# Patient Record
Sex: FEMALE | Race: WHITE | Employment: OTHER | URBAN - METROPOLITAN AREA
[De-identification: names, ages, dates, MRNs, and addresses within clinical notes are randomized per-mention and may not be internally consistent; named-entity substitution may affect disease eponyms.]

---

## 2017-01-19 ENCOUNTER — NURSE ONLY (OUTPATIENT)
Dept: FAMILY MEDICINE CLINIC | Age: 82
End: 2017-01-19

## 2017-01-19 DIAGNOSIS — Z79.01 LONG TERM CURRENT USE OF ANTICOAGULANT THERAPY: Primary | ICD-10-CM

## 2017-01-19 DIAGNOSIS — I48.91 ATRIAL FIBRILLATION, UNSPECIFIED TYPE (HCC): ICD-10-CM

## 2017-01-19 LAB
INTERNATIONAL NORMALIZATION RATIO, POC: 2.5
PROTHROMBIN TIME, POC: 29.9

## 2017-01-19 PROCEDURE — 85610 PROTHROMBIN TIME: CPT | Performed by: NURSE PRACTITIONER

## 2017-02-08 ENCOUNTER — TELEPHONE (OUTPATIENT)
Dept: FAMILY MEDICINE CLINIC | Age: 82
End: 2017-02-08

## 2017-02-08 RX ORDER — CELECOXIB 200 MG/1
CAPSULE ORAL
Qty: 30 CAPSULE | Refills: 0 | Status: SHIPPED | OUTPATIENT
Start: 2017-02-08 | End: 2017-02-14 | Stop reason: SDUPTHER

## 2017-02-08 RX ORDER — AMLODIPINE BESYLATE 5 MG/1
5 TABLET ORAL DAILY
Qty: 30 TABLET | Refills: 0 | Status: SHIPPED | OUTPATIENT
Start: 2017-02-08 | End: 2017-02-14 | Stop reason: SDUPTHER

## 2017-02-08 RX ORDER — LEVOTHYROXINE SODIUM 0.07 MG/1
TABLET ORAL
Qty: 30 TABLET | Refills: 0 | Status: SHIPPED | OUTPATIENT
Start: 2017-02-08 | End: 2017-02-15 | Stop reason: SDUPTHER

## 2017-02-14 ENCOUNTER — OFFICE VISIT (OUTPATIENT)
Dept: FAMILY MEDICINE CLINIC | Age: 82
End: 2017-02-14

## 2017-02-14 VITALS
TEMPERATURE: 97.6 F | WEIGHT: 135 LBS | OXYGEN SATURATION: 99 % | SYSTOLIC BLOOD PRESSURE: 136 MMHG | BODY MASS INDEX: 25.49 KG/M2 | HEIGHT: 61 IN | DIASTOLIC BLOOD PRESSURE: 80 MMHG | HEART RATE: 74 BPM | RESPIRATION RATE: 14 BRPM

## 2017-02-14 DIAGNOSIS — E03.9 ACQUIRED HYPOTHYROIDISM: Primary | ICD-10-CM

## 2017-02-14 DIAGNOSIS — M47.819 ARTHRITIS OF SPINE: ICD-10-CM

## 2017-02-14 DIAGNOSIS — I10 ESSENTIAL HYPERTENSION: ICD-10-CM

## 2017-02-14 DIAGNOSIS — I48.20 CHRONIC ATRIAL FIBRILLATION (HCC): ICD-10-CM

## 2017-02-14 LAB
INTERNATIONAL NORMALIZATION RATIO, POC: 3
PROTHROMBIN TIME, POC: 36.2
TSH REFLEX: 0.59 UIU/ML (ref 0.27–4.2)

## 2017-02-14 PROCEDURE — 1123F ACP DISCUSS/DSCN MKR DOCD: CPT | Performed by: NURSE PRACTITIONER

## 2017-02-14 PROCEDURE — 36415 COLL VENOUS BLD VENIPUNCTURE: CPT | Performed by: NURSE PRACTITIONER

## 2017-02-14 PROCEDURE — 99213 OFFICE O/P EST LOW 20 MIN: CPT | Performed by: NURSE PRACTITIONER

## 2017-02-14 PROCEDURE — G8420 CALC BMI NORM PARAMETERS: HCPCS | Performed by: NURSE PRACTITIONER

## 2017-02-14 PROCEDURE — 4040F PNEUMOC VAC/ADMIN/RCVD: CPT | Performed by: NURSE PRACTITIONER

## 2017-02-14 PROCEDURE — G8427 DOCREV CUR MEDS BY ELIG CLIN: HCPCS | Performed by: NURSE PRACTITIONER

## 2017-02-14 PROCEDURE — G8484 FLU IMMUNIZE NO ADMIN: HCPCS | Performed by: NURSE PRACTITIONER

## 2017-02-14 PROCEDURE — 85610 PROTHROMBIN TIME: CPT | Performed by: NURSE PRACTITIONER

## 2017-02-14 PROCEDURE — 1090F PRES/ABSN URINE INCON ASSESS: CPT | Performed by: NURSE PRACTITIONER

## 2017-02-14 PROCEDURE — 1036F TOBACCO NON-USER: CPT | Performed by: NURSE PRACTITIONER

## 2017-02-14 RX ORDER — AMLODIPINE BESYLATE 5 MG/1
5 TABLET ORAL DAILY
Qty: 90 TABLET | Refills: 3 | Status: ON HOLD | OUTPATIENT
Start: 2017-02-14 | End: 2017-09-14 | Stop reason: HOSPADM

## 2017-02-14 RX ORDER — NICOTINE POLACRILEX 4 MG/1
20 GUM, CHEWING ORAL DAILY
COMMUNITY

## 2017-02-14 RX ORDER — CELECOXIB 200 MG/1
CAPSULE ORAL
Qty: 90 CAPSULE | Refills: 3 | Status: SHIPPED | OUTPATIENT
Start: 2017-02-14 | End: 2017-12-23 | Stop reason: SDUPTHER

## 2017-02-14 RX ORDER — LEVOTHYROXINE SODIUM 0.07 MG/1
TABLET ORAL
Qty: 90 TABLET | Refills: 3 | Status: CANCELLED | OUTPATIENT
Start: 2017-02-14

## 2017-02-15 RX ORDER — LEVOTHYROXINE SODIUM 0.07 MG/1
TABLET ORAL
Qty: 90 TABLET | Refills: 2 | Status: SHIPPED | OUTPATIENT
Start: 2017-02-15 | End: 2017-09-30 | Stop reason: SDUPTHER

## 2017-02-27 ENCOUNTER — TELEPHONE (OUTPATIENT)
Dept: FAMILY MEDICINE CLINIC | Age: 82
End: 2017-02-27

## 2017-03-07 RX ORDER — GUANFACINE 1 MG/1
TABLET ORAL
Qty: 60 TABLET | Refills: 4 | Status: SHIPPED | OUTPATIENT
Start: 2017-03-07 | End: 2018-03-06 | Stop reason: SDUPTHER

## 2017-03-08 RX ORDER — WARFARIN SODIUM 2 MG/1
TABLET ORAL
Qty: 135 TABLET | Refills: 0 | Status: SHIPPED | OUTPATIENT
Start: 2017-03-08 | End: 2017-11-08 | Stop reason: SDUPTHER

## 2017-03-16 ENCOUNTER — ANTI-COAG VISIT (OUTPATIENT)
Dept: FAMILY MEDICINE CLINIC | Age: 82
End: 2017-03-16

## 2017-03-16 DIAGNOSIS — I48.91 ATRIAL FIBRILLATION, UNSPECIFIED TYPE (HCC): ICD-10-CM

## 2017-03-16 DIAGNOSIS — Z79.01 LONG TERM CURRENT USE OF ANTICOAGULANT THERAPY: Primary | ICD-10-CM

## 2017-03-16 LAB
INTERNATIONAL NORMALIZATION RATIO, POC: 2.1
PROTHROMBIN TIME, POC: 25.8

## 2017-03-16 PROCEDURE — 85610 PROTHROMBIN TIME: CPT | Performed by: FAMILY MEDICINE

## 2017-04-07 RX ORDER — TRAMADOL HYDROCHLORIDE 50 MG/1
TABLET ORAL
Qty: 120 TABLET | Refills: 0 | OUTPATIENT
Start: 2017-04-07

## 2017-04-11 RX ORDER — TRAMADOL HYDROCHLORIDE 50 MG/1
TABLET ORAL
Qty: 120 TABLET | Refills: 0 | Status: SHIPPED | OUTPATIENT
Start: 2017-04-11 | End: 2017-04-12 | Stop reason: SDUPTHER

## 2017-04-12 ENCOUNTER — OFFICE VISIT (OUTPATIENT)
Dept: FAMILY MEDICINE CLINIC | Age: 82
End: 2017-04-12

## 2017-04-12 VITALS
WEIGHT: 132.6 LBS | BODY MASS INDEX: 25.04 KG/M2 | RESPIRATION RATE: 16 BRPM | DIASTOLIC BLOOD PRESSURE: 86 MMHG | OXYGEN SATURATION: 98 % | HEIGHT: 61 IN | HEART RATE: 75 BPM | SYSTOLIC BLOOD PRESSURE: 160 MMHG

## 2017-04-12 DIAGNOSIS — I10 ESSENTIAL HYPERTENSION: Primary | ICD-10-CM

## 2017-04-12 DIAGNOSIS — E03.9 ACQUIRED HYPOTHYROIDISM: ICD-10-CM

## 2017-04-12 DIAGNOSIS — I48.91 ATRIAL FIBRILLATION, UNSPECIFIED TYPE (HCC): ICD-10-CM

## 2017-04-12 DIAGNOSIS — Z79.01 LONG TERM CURRENT USE OF ANTICOAGULANT THERAPY: ICD-10-CM

## 2017-04-12 DIAGNOSIS — E78.00 HYPERCHOLESTEREMIA: ICD-10-CM

## 2017-04-12 DIAGNOSIS — M15.9 PRIMARY OSTEOARTHRITIS INVOLVING MULTIPLE JOINTS: ICD-10-CM

## 2017-04-12 DIAGNOSIS — K21.9 GASTROESOPHAGEAL REFLUX DISEASE, ESOPHAGITIS PRESENCE NOT SPECIFIED: ICD-10-CM

## 2017-04-12 DIAGNOSIS — K59.04 CHRONIC IDIOPATHIC CONSTIPATION: ICD-10-CM

## 2017-04-12 LAB
INTERNATIONAL NORMALIZATION RATIO, POC: 2.6
PROTHROMBIN TIME, POC: 30.8

## 2017-04-12 PROCEDURE — G8420 CALC BMI NORM PARAMETERS: HCPCS | Performed by: FAMILY MEDICINE

## 2017-04-12 PROCEDURE — G8427 DOCREV CUR MEDS BY ELIG CLIN: HCPCS | Performed by: FAMILY MEDICINE

## 2017-04-12 PROCEDURE — 4040F PNEUMOC VAC/ADMIN/RCVD: CPT | Performed by: FAMILY MEDICINE

## 2017-04-12 PROCEDURE — 1123F ACP DISCUSS/DSCN MKR DOCD: CPT | Performed by: FAMILY MEDICINE

## 2017-04-12 PROCEDURE — 1036F TOBACCO NON-USER: CPT | Performed by: FAMILY MEDICINE

## 2017-04-12 PROCEDURE — 85610 PROTHROMBIN TIME: CPT | Performed by: FAMILY MEDICINE

## 2017-04-12 PROCEDURE — 1090F PRES/ABSN URINE INCON ASSESS: CPT | Performed by: FAMILY MEDICINE

## 2017-04-12 PROCEDURE — 99214 OFFICE O/P EST MOD 30 MIN: CPT | Performed by: FAMILY MEDICINE

## 2017-04-12 RX ORDER — TRAMADOL HYDROCHLORIDE 50 MG/1
TABLET ORAL
Qty: 120 TABLET | Refills: 0 | Status: ON HOLD | OUTPATIENT
Start: 2017-04-12 | End: 2017-09-14 | Stop reason: HOSPADM

## 2017-04-12 RX ORDER — LOSARTAN POTASSIUM 50 MG/1
TABLET ORAL
Qty: 60 TABLET | Refills: 0 | Status: SHIPPED | OUTPATIENT
Start: 2017-04-12 | End: 2017-05-11 | Stop reason: SDUPTHER

## 2017-04-28 ENCOUNTER — PATIENT MESSAGE (OUTPATIENT)
Dept: FAMILY MEDICINE CLINIC | Age: 82
End: 2017-04-28

## 2017-04-28 RX ORDER — CLONIDINE HYDROCHLORIDE 0.1 MG/1
TABLET ORAL
Qty: 60 TABLET | Refills: 2 | Status: SHIPPED | OUTPATIENT
Start: 2017-04-28 | End: 2017-05-01 | Stop reason: SDUPTHER

## 2017-04-29 ENCOUNTER — PATIENT MESSAGE (OUTPATIENT)
Dept: FAMILY MEDICINE CLINIC | Age: 82
End: 2017-04-29

## 2017-05-01 RX ORDER — CLONIDINE HYDROCHLORIDE 0.1 MG/1
TABLET ORAL
Qty: 60 TABLET | Refills: 0 | Status: SHIPPED | OUTPATIENT
Start: 2017-05-01 | End: 2017-05-11 | Stop reason: SDUPTHER

## 2017-05-02 ENCOUNTER — TELEPHONE (OUTPATIENT)
Dept: FAMILY MEDICINE CLINIC | Age: 82
End: 2017-05-02

## 2017-05-10 RX ORDER — CLONIDINE HYDROCHLORIDE 0.1 MG/1
TABLET ORAL
Qty: 180 TABLET | Refills: 0 | Status: SHIPPED | OUTPATIENT
Start: 2017-05-10 | End: 2017-06-19 | Stop reason: ALTCHOICE

## 2017-05-11 ENCOUNTER — PATIENT MESSAGE (OUTPATIENT)
Dept: FAMILY MEDICINE CLINIC | Age: 82
End: 2017-05-11

## 2017-05-11 ENCOUNTER — OFFICE VISIT (OUTPATIENT)
Dept: FAMILY MEDICINE CLINIC | Age: 82
End: 2017-05-11

## 2017-05-11 VITALS
DIASTOLIC BLOOD PRESSURE: 74 MMHG | SYSTOLIC BLOOD PRESSURE: 120 MMHG | HEART RATE: 54 BPM | BODY MASS INDEX: 24.66 KG/M2 | OXYGEN SATURATION: 97 % | WEIGHT: 134 LBS | HEIGHT: 62 IN | RESPIRATION RATE: 12 BRPM

## 2017-05-11 DIAGNOSIS — Z79.01 LONG TERM CURRENT USE OF ANTICOAGULANT THERAPY: ICD-10-CM

## 2017-05-11 DIAGNOSIS — I48.91 ATRIAL FIBRILLATION, UNSPECIFIED TYPE (HCC): ICD-10-CM

## 2017-05-11 DIAGNOSIS — R39.9 UTI SYMPTOMS: ICD-10-CM

## 2017-05-11 DIAGNOSIS — I10 ESSENTIAL HYPERTENSION: Primary | ICD-10-CM

## 2017-05-11 DIAGNOSIS — R44.3 HALLUCINATIONS: ICD-10-CM

## 2017-05-11 LAB
BILIRUBIN, POC: 1.02
BLOOD URINE, POC: NORMAL
CLARITY, POC: NORMAL
COLOR, POC: NORMAL
GLUCOSE URINE, POC: NORMAL
INTERNATIONAL NORMALIZATION RATIO, POC: 2.9
KETONES, POC: NORMAL
LEUKOCYTE EST, POC: NORMAL
NITRITE, POC: NORMAL
PH, POC: 7
PROTEIN, POC: NORMAL
PROTHROMBIN TIME, POC: 35.1
SPECIFIC GRAVITY, POC: 7
UROBILINOGEN, POC: 0.2

## 2017-05-11 PROCEDURE — 85610 PROTHROMBIN TIME: CPT | Performed by: FAMILY MEDICINE

## 2017-05-11 PROCEDURE — 99214 OFFICE O/P EST MOD 30 MIN: CPT | Performed by: FAMILY MEDICINE

## 2017-05-11 PROCEDURE — 1036F TOBACCO NON-USER: CPT | Performed by: FAMILY MEDICINE

## 2017-05-11 PROCEDURE — 1090F PRES/ABSN URINE INCON ASSESS: CPT | Performed by: FAMILY MEDICINE

## 2017-05-11 PROCEDURE — 1123F ACP DISCUSS/DSCN MKR DOCD: CPT | Performed by: FAMILY MEDICINE

## 2017-05-11 PROCEDURE — 81002 URINALYSIS NONAUTO W/O SCOPE: CPT | Performed by: FAMILY MEDICINE

## 2017-05-11 PROCEDURE — G8420 CALC BMI NORM PARAMETERS: HCPCS | Performed by: FAMILY MEDICINE

## 2017-05-11 PROCEDURE — 4040F PNEUMOC VAC/ADMIN/RCVD: CPT | Performed by: FAMILY MEDICINE

## 2017-05-11 PROCEDURE — G8427 DOCREV CUR MEDS BY ELIG CLIN: HCPCS | Performed by: FAMILY MEDICINE

## 2017-05-11 RX ORDER — SPIRONOLACTONE 25 MG/1
25 TABLET ORAL DAILY
Qty: 30 TABLET | Refills: 3 | Status: SHIPPED | OUTPATIENT
Start: 2017-05-11 | End: 2017-05-12 | Stop reason: SDUPTHER

## 2017-05-11 RX ORDER — LOSARTAN POTASSIUM 100 MG/1
TABLET ORAL
Qty: 30 TABLET | Refills: 5 | Status: SHIPPED | OUTPATIENT
Start: 2017-05-11 | End: 2017-05-12 | Stop reason: SDUPTHER

## 2017-05-12 ENCOUNTER — PATIENT MESSAGE (OUTPATIENT)
Dept: FAMILY MEDICINE CLINIC | Age: 82
End: 2017-05-12

## 2017-05-12 RX ORDER — SPIRONOLACTONE 25 MG/1
25 TABLET ORAL DAILY
Qty: 30 TABLET | Refills: 3 | Status: SHIPPED | OUTPATIENT
Start: 2017-05-12 | End: 2017-09-05 | Stop reason: SDUPTHER

## 2017-05-12 RX ORDER — LOSARTAN POTASSIUM 100 MG/1
TABLET ORAL
Qty: 30 TABLET | Refills: 5 | Status: SHIPPED | OUTPATIENT
Start: 2017-05-12 | End: 2017-11-08 | Stop reason: SDUPTHER

## 2017-05-13 LAB — URINE CULTURE, ROUTINE: NORMAL

## 2017-05-23 ENCOUNTER — PATIENT MESSAGE (OUTPATIENT)
Dept: FAMILY MEDICINE CLINIC | Age: 82
End: 2017-05-23

## 2017-06-07 ENCOUNTER — NURSE ONLY (OUTPATIENT)
Dept: FAMILY MEDICINE CLINIC | Age: 82
End: 2017-06-07

## 2017-06-07 DIAGNOSIS — Z79.01 LONG TERM CURRENT USE OF ANTICOAGULANT THERAPY: ICD-10-CM

## 2017-06-07 LAB
INTERNATIONAL NORMALIZATION RATIO, POC: 2
PROTHROMBIN TIME, POC: 23.7

## 2017-06-07 PROCEDURE — 85610 PROTHROMBIN TIME: CPT | Performed by: FAMILY MEDICINE

## 2017-06-19 ENCOUNTER — OFFICE VISIT (OUTPATIENT)
Dept: FAMILY MEDICINE CLINIC | Age: 82
End: 2017-06-19

## 2017-06-19 VITALS
BODY MASS INDEX: 24.55 KG/M2 | HEIGHT: 61 IN | DIASTOLIC BLOOD PRESSURE: 84 MMHG | OXYGEN SATURATION: 98 % | SYSTOLIC BLOOD PRESSURE: 138 MMHG | RESPIRATION RATE: 12 BRPM | WEIGHT: 130 LBS | HEART RATE: 81 BPM

## 2017-06-19 DIAGNOSIS — I10 ESSENTIAL HYPERTENSION: Primary | ICD-10-CM

## 2017-06-19 PROCEDURE — G8427 DOCREV CUR MEDS BY ELIG CLIN: HCPCS | Performed by: FAMILY MEDICINE

## 2017-06-19 PROCEDURE — 99213 OFFICE O/P EST LOW 20 MIN: CPT | Performed by: FAMILY MEDICINE

## 2017-06-19 PROCEDURE — 1090F PRES/ABSN URINE INCON ASSESS: CPT | Performed by: FAMILY MEDICINE

## 2017-06-19 PROCEDURE — 4040F PNEUMOC VAC/ADMIN/RCVD: CPT | Performed by: FAMILY MEDICINE

## 2017-06-19 PROCEDURE — G8420 CALC BMI NORM PARAMETERS: HCPCS | Performed by: FAMILY MEDICINE

## 2017-06-19 PROCEDURE — 1036F TOBACCO NON-USER: CPT | Performed by: FAMILY MEDICINE

## 2017-06-19 PROCEDURE — 1123F ACP DISCUSS/DSCN MKR DOCD: CPT | Performed by: FAMILY MEDICINE

## 2017-07-05 ENCOUNTER — ANTI-COAG VISIT (OUTPATIENT)
Dept: FAMILY MEDICINE CLINIC | Age: 82
End: 2017-07-05

## 2017-07-05 DIAGNOSIS — Z79.01 LONG TERM CURRENT USE OF ANTICOAGULANT THERAPY: ICD-10-CM

## 2017-07-05 LAB
INTERNATIONAL NORMALIZATION RATIO, POC: 3
PROTHROMBIN TIME, POC: 36.1

## 2017-07-05 PROCEDURE — 85610 PROTHROMBIN TIME: CPT | Performed by: FAMILY MEDICINE

## 2017-08-03 ENCOUNTER — ANTI-COAG VISIT (OUTPATIENT)
Dept: FAMILY MEDICINE CLINIC | Age: 82
End: 2017-08-03

## 2017-08-03 DIAGNOSIS — Z79.01 LONG TERM CURRENT USE OF ANTICOAGULANT THERAPY: ICD-10-CM

## 2017-08-03 LAB
INTERNATIONAL NORMALIZATION RATIO, POC: 2.4
PROTHROMBIN TIME, POC: 28.2

## 2017-08-03 PROCEDURE — 85610 PROTHROMBIN TIME: CPT | Performed by: FAMILY MEDICINE

## 2017-08-31 ENCOUNTER — ANTI-COAG VISIT (OUTPATIENT)
Dept: FAMILY MEDICINE CLINIC | Age: 82
End: 2017-08-31

## 2017-08-31 DIAGNOSIS — Z79.01 LONG TERM CURRENT USE OF ANTICOAGULANT THERAPY: ICD-10-CM

## 2017-08-31 LAB
INTERNATIONAL NORMALIZATION RATIO, POC: 3.3
PROTHROMBIN TIME, POC: 39.4

## 2017-08-31 PROCEDURE — 85610 PROTHROMBIN TIME: CPT | Performed by: FAMILY MEDICINE

## 2017-09-05 RX ORDER — SPIRONOLACTONE 25 MG/1
25 TABLET ORAL DAILY
Qty: 90 TABLET | Refills: 3 | Status: SHIPPED | OUTPATIENT
Start: 2017-09-05 | End: 2018-08-30 | Stop reason: SDUPTHER

## 2017-09-07 ENCOUNTER — ANTI-COAG VISIT (OUTPATIENT)
Dept: FAMILY MEDICINE CLINIC | Age: 82
End: 2017-09-07

## 2017-09-07 DIAGNOSIS — Z79.01 LONG TERM CURRENT USE OF ANTICOAGULANT THERAPY: ICD-10-CM

## 2017-09-07 DIAGNOSIS — I48.91 ATRIAL FIBRILLATION, UNSPECIFIED TYPE (HCC): Primary | ICD-10-CM

## 2017-09-07 LAB
INTERNATIONAL NORMALIZATION RATIO, POC: 2.1
PROTHROMBIN TIME, POC: 25.6

## 2017-09-07 PROCEDURE — 85610 PROTHROMBIN TIME: CPT | Performed by: FAMILY MEDICINE

## 2017-09-15 ENCOUNTER — CARE COORDINATION (OUTPATIENT)
Dept: CASE MANAGEMENT | Age: 82
End: 2017-09-15

## 2017-09-20 ENCOUNTER — OFFICE VISIT (OUTPATIENT)
Dept: FAMILY MEDICINE CLINIC | Age: 82
End: 2017-09-20

## 2017-09-20 VITALS
OXYGEN SATURATION: 98 % | DIASTOLIC BLOOD PRESSURE: 76 MMHG | SYSTOLIC BLOOD PRESSURE: 128 MMHG | RESPIRATION RATE: 14 BRPM | HEIGHT: 60 IN | BODY MASS INDEX: 25.72 KG/M2 | HEART RATE: 76 BPM | WEIGHT: 131 LBS

## 2017-09-20 DIAGNOSIS — E87.1 HYPONATREMIA: Primary | ICD-10-CM

## 2017-09-20 DIAGNOSIS — M19.90 ARTHRITIS: ICD-10-CM

## 2017-09-20 DIAGNOSIS — I48.91 ATRIAL FIBRILLATION, UNSPECIFIED TYPE (HCC): ICD-10-CM

## 2017-09-20 DIAGNOSIS — I10 ESSENTIAL HYPERTENSION: ICD-10-CM

## 2017-09-20 DIAGNOSIS — Z79.01 LONG TERM CURRENT USE OF ANTICOAGULANT THERAPY: ICD-10-CM

## 2017-09-20 DIAGNOSIS — C23 ADENOCARCINOMA OF GALLBLADDER (HCC): ICD-10-CM

## 2017-09-20 DIAGNOSIS — E03.9 ACQUIRED HYPOTHYROIDISM: ICD-10-CM

## 2017-09-20 DIAGNOSIS — R53.1 GENERAL WEAKNESS: ICD-10-CM

## 2017-09-20 LAB
INTERNATIONAL NORMALIZATION RATIO, POC: 1.9
PROTHROMBIN TIME, POC: 22.3

## 2017-09-20 PROCEDURE — 85610 PROTHROMBIN TIME: CPT | Performed by: FAMILY MEDICINE

## 2017-09-20 PROCEDURE — G8427 DOCREV CUR MEDS BY ELIG CLIN: HCPCS | Performed by: FAMILY MEDICINE

## 2017-09-20 PROCEDURE — 99214 OFFICE O/P EST MOD 30 MIN: CPT | Performed by: FAMILY MEDICINE

## 2017-09-20 PROCEDURE — 4040F PNEUMOC VAC/ADMIN/RCVD: CPT | Performed by: FAMILY MEDICINE

## 2017-09-20 PROCEDURE — G8417 CALC BMI ABV UP PARAM F/U: HCPCS | Performed by: FAMILY MEDICINE

## 2017-09-20 PROCEDURE — 1036F TOBACCO NON-USER: CPT | Performed by: FAMILY MEDICINE

## 2017-09-20 PROCEDURE — 1111F DSCHRG MED/CURRENT MED MERGE: CPT | Performed by: FAMILY MEDICINE

## 2017-09-20 PROCEDURE — 1123F ACP DISCUSS/DSCN MKR DOCD: CPT | Performed by: FAMILY MEDICINE

## 2017-09-20 PROCEDURE — 1090F PRES/ABSN URINE INCON ASSESS: CPT | Performed by: FAMILY MEDICINE

## 2017-09-20 RX ORDER — TRAMADOL HYDROCHLORIDE 50 MG/1
50 TABLET ORAL EVERY 6 HOURS PRN
Qty: 120 TABLET | Refills: 0 | Status: SHIPPED | OUTPATIENT
Start: 2017-09-20 | End: 2018-03-08 | Stop reason: SDUPTHER

## 2017-09-21 ENCOUNTER — CARE COORDINATION (OUTPATIENT)
Dept: CASE MANAGEMENT | Age: 82
End: 2017-09-21

## 2017-10-02 RX ORDER — LEVOTHYROXINE SODIUM 0.07 MG/1
TABLET ORAL
Qty: 90 TABLET | Refills: 5 | Status: SHIPPED | OUTPATIENT
Start: 2017-10-02 | End: 2019-01-14 | Stop reason: SDUPTHER

## 2017-10-12 ENCOUNTER — OFFICE VISIT (OUTPATIENT)
Dept: FAMILY MEDICINE CLINIC | Age: 82
End: 2017-10-12

## 2017-10-12 VITALS
SYSTOLIC BLOOD PRESSURE: 128 MMHG | BODY MASS INDEX: 25.4 KG/M2 | HEART RATE: 82 BPM | DIASTOLIC BLOOD PRESSURE: 72 MMHG | HEIGHT: 62 IN | WEIGHT: 138 LBS | RESPIRATION RATE: 12 BRPM

## 2017-10-12 DIAGNOSIS — K21.9 GASTROESOPHAGEAL REFLUX DISEASE, ESOPHAGITIS PRESENCE NOT SPECIFIED: Primary | ICD-10-CM

## 2017-10-12 DIAGNOSIS — Z79.01 LONG TERM CURRENT USE OF ANTICOAGULANT THERAPY: ICD-10-CM

## 2017-10-12 DIAGNOSIS — E87.1 HYPONATREMIA: ICD-10-CM

## 2017-10-12 LAB
ANION GAP SERPL CALCULATED.3IONS-SCNC: 15 MMOL/L (ref 3–16)
BUN BLDV-MCNC: 22 MG/DL (ref 7–20)
CALCIUM SERPL-MCNC: 10.1 MG/DL (ref 8.3–10.6)
CHLORIDE BLD-SCNC: 98 MMOL/L (ref 99–110)
CO2: 25 MMOL/L (ref 21–32)
CREAT SERPL-MCNC: 0.9 MG/DL (ref 0.6–1.2)
GFR AFRICAN AMERICAN: >60
GFR NON-AFRICAN AMERICAN: 59
GLUCOSE BLD-MCNC: 110 MG/DL (ref 70–99)
INTERNATIONAL NORMALIZATION RATIO, POC: 2.9
POTASSIUM SERPL-SCNC: 4.7 MMOL/L (ref 3.5–5.1)
PROTHROMBIN TIME, POC: 35
SODIUM BLD-SCNC: 138 MMOL/L (ref 136–145)

## 2017-10-12 PROCEDURE — 36415 COLL VENOUS BLD VENIPUNCTURE: CPT | Performed by: FAMILY MEDICINE

## 2017-10-12 PROCEDURE — 1036F TOBACCO NON-USER: CPT | Performed by: FAMILY MEDICINE

## 2017-10-12 PROCEDURE — 1123F ACP DISCUSS/DSCN MKR DOCD: CPT | Performed by: FAMILY MEDICINE

## 2017-10-12 PROCEDURE — 99213 OFFICE O/P EST LOW 20 MIN: CPT | Performed by: FAMILY MEDICINE

## 2017-10-12 PROCEDURE — 1090F PRES/ABSN URINE INCON ASSESS: CPT | Performed by: FAMILY MEDICINE

## 2017-10-12 PROCEDURE — G8484 FLU IMMUNIZE NO ADMIN: HCPCS | Performed by: FAMILY MEDICINE

## 2017-10-12 PROCEDURE — 4040F PNEUMOC VAC/ADMIN/RCVD: CPT | Performed by: FAMILY MEDICINE

## 2017-10-12 PROCEDURE — G8417 CALC BMI ABV UP PARAM F/U: HCPCS | Performed by: FAMILY MEDICINE

## 2017-10-12 PROCEDURE — 1111F DSCHRG MED/CURRENT MED MERGE: CPT | Performed by: FAMILY MEDICINE

## 2017-10-12 PROCEDURE — 85610 PROTHROMBIN TIME: CPT | Performed by: FAMILY MEDICINE

## 2017-10-12 PROCEDURE — G8427 DOCREV CUR MEDS BY ELIG CLIN: HCPCS | Performed by: FAMILY MEDICINE

## 2017-10-12 RX ORDER — AMLODIPINE BESYLATE 5 MG/1
TABLET ORAL
COMMUNITY
Start: 2017-10-08 | End: 2017-12-23 | Stop reason: SDUPTHER

## 2017-10-12 NOTE — PROGRESS NOTES
Subjective:      Patient ID: Tremaine Kwok is a 80 y.o. female. HPI  Having joint pain - but o/w feeling ok  No recent bleeding  Had bleeding for esophageal ? Ulcer - on ppi since then. Still getting anam - takes antacid tab -2-4/d. On prilosec daily - other ppi didn't help  Takes daily mid morning  celebrex daily helps oa a lot  Stopped glucosamine and doing ok - no change  Off calcium - takes vit d through [facility]. BP Readings from Last 3 Encounters:   10/12/17 128/72   09/20/17 128/76   09/14/17 (!) 163/74     Wt Readings from Last 3 Encounters:   10/12/17 138 lb (62.6 kg)   09/20/17 131 lb (59.4 kg)   09/12/17 124 lb 6.4 oz (56.4 kg)     Pulse Readings from Last 3 Encounters:   10/12/17 82   09/20/17 76   09/14/17 83     120's/ 60's usually. Eating more - lately - better sodium intake  inr 2.9 from 1.9. Water pill qod working well - swelling down overall. Lowered water intake somewhat - eating more salt - feels more normal.  Memory not bad - some loss. Review of Systems    Objective:   Physical Exam   Constitutional: She is oriented to person, place, and time. She appears well-developed and well-nourished. No distress. HENT:   Head: Normocephalic and atraumatic. Eyes: Conjunctivae are normal. No scleral icterus. Cardiovascular: Normal rate, regular rhythm, normal heart sounds and intact distal pulses. No murmur heard. Pulmonary/Chest: Effort normal and breath sounds normal. No respiratory distress. She has no wheezes. She has no rales. Abdominal: Soft. Bowel sounds are normal. She exhibits no distension. There is no tenderness. Musculoskeletal: She exhibits no edema. Neurological: She is alert and oriented to person, place, and time. Skin: Skin is warm and dry. Psychiatric: She has a normal mood and affect. Assessment:      1. Gastroesophageal reflux disease, esophagitis presence not specified     2. Long term current use of anticoagulant therapy  POCT INR   3.  Hyponatremia

## 2017-10-26 ENCOUNTER — ANTI-COAG VISIT (OUTPATIENT)
Dept: FAMILY MEDICINE CLINIC | Age: 82
End: 2017-10-26

## 2017-10-26 DIAGNOSIS — Z79.01 LONG TERM CURRENT USE OF ANTICOAGULANT THERAPY: ICD-10-CM

## 2017-10-26 LAB
INTERNATIONAL NORMALIZATION RATIO, POC: 3.1
PROTHROMBIN TIME, POC: 37.5

## 2017-10-26 PROCEDURE — 85610 PROTHROMBIN TIME: CPT | Performed by: FAMILY MEDICINE

## 2017-11-02 ENCOUNTER — ANTI-COAG VISIT (OUTPATIENT)
Dept: FAMILY MEDICINE CLINIC | Age: 82
End: 2017-11-02

## 2017-11-02 DIAGNOSIS — Z79.01 LONG TERM CURRENT USE OF ANTICOAGULANT THERAPY: ICD-10-CM

## 2017-11-02 LAB
INTERNATIONAL NORMALIZATION RATIO, POC: 3.3
PROTHROMBIN TIME, POC: 39.1

## 2017-11-02 PROCEDURE — 85610 PROTHROMBIN TIME: CPT | Performed by: FAMILY MEDICINE

## 2017-11-08 RX ORDER — LOSARTAN POTASSIUM 100 MG/1
TABLET ORAL
Qty: 30 TABLET | Refills: 4 | Status: SHIPPED | OUTPATIENT
Start: 2017-11-08 | End: 2018-04-10 | Stop reason: SDUPTHER

## 2017-11-09 ENCOUNTER — ANTI-COAG VISIT (OUTPATIENT)
Dept: FAMILY MEDICINE CLINIC | Age: 82
End: 2017-11-09

## 2017-11-09 DIAGNOSIS — Z79.01 LONG TERM CURRENT USE OF ANTICOAGULANT THERAPY: ICD-10-CM

## 2017-11-09 LAB
INTERNATIONAL NORMALIZATION RATIO, POC: 2.1
PROTHROMBIN TIME, POC: 25.3

## 2017-11-09 PROCEDURE — 85610 PROTHROMBIN TIME: CPT | Performed by: FAMILY MEDICINE

## 2017-11-21 ENCOUNTER — ANTI-COAG VISIT (OUTPATIENT)
Dept: FAMILY MEDICINE CLINIC | Age: 82
End: 2017-11-21

## 2017-11-21 DIAGNOSIS — Z79.01 LONG TERM CURRENT USE OF ANTICOAGULANT THERAPY: ICD-10-CM

## 2017-11-21 LAB
INTERNATIONAL NORMALIZATION RATIO, POC: 2.5
PROTHROMBIN TIME, POC: 29.7

## 2017-11-21 PROCEDURE — 85610 PROTHROMBIN TIME: CPT | Performed by: FAMILY MEDICINE

## 2017-11-30 ENCOUNTER — OFFICE VISIT (OUTPATIENT)
Dept: FAMILY MEDICINE CLINIC | Age: 82
End: 2017-11-30

## 2017-11-30 VITALS — BODY MASS INDEX: 25.03 KG/M2 | HEIGHT: 62 IN | WEIGHT: 136 LBS

## 2017-11-30 DIAGNOSIS — F41.9 ANXIETY: Primary | ICD-10-CM

## 2017-11-30 PROCEDURE — 1123F ACP DISCUSS/DSCN MKR DOCD: CPT | Performed by: FAMILY MEDICINE

## 2017-11-30 PROCEDURE — 1036F TOBACCO NON-USER: CPT | Performed by: FAMILY MEDICINE

## 2017-11-30 PROCEDURE — G8484 FLU IMMUNIZE NO ADMIN: HCPCS | Performed by: FAMILY MEDICINE

## 2017-11-30 PROCEDURE — 1090F PRES/ABSN URINE INCON ASSESS: CPT | Performed by: FAMILY MEDICINE

## 2017-11-30 PROCEDURE — 99212 OFFICE O/P EST SF 10 MIN: CPT | Performed by: FAMILY MEDICINE

## 2017-11-30 PROCEDURE — G8420 CALC BMI NORM PARAMETERS: HCPCS | Performed by: FAMILY MEDICINE

## 2017-11-30 PROCEDURE — G8427 DOCREV CUR MEDS BY ELIG CLIN: HCPCS | Performed by: FAMILY MEDICINE

## 2017-11-30 PROCEDURE — 4040F PNEUMOC VAC/ADMIN/RCVD: CPT | Performed by: FAMILY MEDICINE

## 2017-11-30 NOTE — PATIENT INSTRUCTIONS
Umm Pam was seen today for other. Diagnoses and all orders for this visit:    Anxiety  Worry is another form of anxiety. Information. [employer]. [facility]  Updated Mar 27, 2012  [address] [facility] Yandy Lomax, Sophia Hassan [address] [facility], [place] [address]  [phone] Fax  [phone]     Patient Education   Anxiety Disorder: Care Instructions       Patient Education   Learning About Anxiety Disorders  What are anxiety disorders?   Panic Attacks: Care Instructions  Leg and Ankle Edema: Care Instructions

## 2017-11-30 NOTE — PROGRESS NOTES
 Calcium Carbonate Antacid (MAALOX) 600 MG CHEW Take 1 tablet by mouth daily as needed.  diphenhydrAMINE (BENADRYL) 25 MG tablet Take 25 mg by mouth nightly as needed. No current facility-administered medications for this visit. Review of Systems    Objective:   Physical Exam   Constitutional: She appears well-developed. No distress. Skin: She is not diaphoretic. Psychiatric: She has a normal mood and affect. Her speech is normal and behavior is normal. Judgment normal. Cognition and memory are normal.   Nursing note and vitals reviewed. Vitals:    11/30/17 1636   Weight: 136 lb (61.7 kg)  Comment: PT STATED   Height: 5' 2\" (1.575 m)     BP Readings from Last 3 Encounters:   10/12/17 128/72   09/20/17 128/76   09/14/17 (!) 163/74     Pulse Readings from Last 3 Encounters:   10/12/17 82   09/20/17 76   09/14/17 83     Wt Readings from Last 3 Encounters:   11/30/17 136 lb (61.7 kg)   10/12/17 138 lb (62.6 kg)   09/20/17 131 lb (59.4 kg)     Body mass index is 24.87 kg/m². Assessment:      1. Anxiety          Plan:      Jose Pérez was seen today for other. Diagnoses and all orders for this visit:    Anxiety  Worry is another form of anxiety. Information. [employer]. [facility]  Updated Mar 27, 2012  [address] [place] [address]  [phone] Fax  [phone]     Patient Education   Anxiety Disorder: Care Instructions       Patient Education   Learning About Anxiety Disorders  What are anxiety disorders?   Panic Attacks: Care Instructions  Leg and Ankle Edema: Care Instructions

## 2017-12-21 ENCOUNTER — ANTI-COAG VISIT (OUTPATIENT)
Dept: FAMILY MEDICINE CLINIC | Age: 82
End: 2017-12-21

## 2017-12-21 DIAGNOSIS — Z79.01 LONG TERM CURRENT USE OF ANTICOAGULANT THERAPY: ICD-10-CM

## 2017-12-21 LAB
INTERNATIONAL NORMALIZATION RATIO, POC: 2.3
PROTHROMBIN TIME, POC: 28.1

## 2017-12-21 PROCEDURE — 85610 PROTHROMBIN TIME: CPT | Performed by: FAMILY MEDICINE

## 2018-01-04 ENCOUNTER — OFFICE VISIT (OUTPATIENT)
Dept: FAMILY MEDICINE CLINIC | Age: 83
End: 2018-01-04

## 2018-01-04 VITALS
OXYGEN SATURATION: 98 % | BODY MASS INDEX: 23.52 KG/M2 | HEIGHT: 62 IN | RESPIRATION RATE: 16 BRPM | WEIGHT: 127.8 LBS | TEMPERATURE: 98 F | SYSTOLIC BLOOD PRESSURE: 132 MMHG | HEART RATE: 70 BPM | DIASTOLIC BLOOD PRESSURE: 70 MMHG

## 2018-01-04 DIAGNOSIS — F41.9 ANXIOUSNESS: Primary | ICD-10-CM

## 2018-01-04 DIAGNOSIS — I44.2 THIRD DEGREE HEART BLOCK (HCC): ICD-10-CM

## 2018-01-04 DIAGNOSIS — Z23 NEED FOR VACCINATION FOR STREP PNEUMONIAE: ICD-10-CM

## 2018-01-04 PROBLEM — H04.129 DRY EYE SYNDROME: Status: ACTIVE | Noted: 2018-01-04

## 2018-01-04 PROBLEM — H35.372 PUCKERING OF LEFT MACULA: Status: ACTIVE | Noted: 2018-01-04

## 2018-01-04 PROBLEM — H16.223 KERATOCONJUNCTIVITIS SICCA OF BOTH EYES NOT SPECIFIED AS SJOGREN'S: Status: ACTIVE | Noted: 2018-01-04

## 2018-01-04 PROBLEM — H35.361 DRUSEN OF RIGHT MACULA: Status: ACTIVE | Noted: 2018-01-04

## 2018-01-04 PROCEDURE — 90732 PPSV23 VACC 2 YRS+ SUBQ/IM: CPT | Performed by: FAMILY MEDICINE

## 2018-01-04 PROCEDURE — G0009 ADMIN PNEUMOCOCCAL VACCINE: HCPCS | Performed by: FAMILY MEDICINE

## 2018-01-04 PROCEDURE — 99213 OFFICE O/P EST LOW 20 MIN: CPT | Performed by: FAMILY MEDICINE

## 2018-01-04 ASSESSMENT — ENCOUNTER SYMPTOMS: SHORTNESS OF BREATH: 0

## 2018-01-04 NOTE — PATIENT INSTRUCTIONS
social groups, or volunteer to help others. Being alone sometimes makes things seem worse than they are. · Get at least 30 minutes of exercise on most days of the week to relieve stress. Walking is a good choice. You also may want to do other activities, such as running, swimming, cycling, or playing tennis or team sports. Relaxation techniques  Do relaxation exercises 10 to 20 minutes a day. You can play soothing, relaxing music while you do them, if you wish. · Tell others in your house that you are going to do your relaxation exercises. Ask them not to disturb you. · Find a comfortable place, away from all distractions and noise. · Lie down on your back, or sit with your back straight. · Focus on your breathing. Make it slow and steady. · Breathe in through your nose. Breathe out through either your nose or mouth. · Breathe deeply, filling up the area between your navel and your rib cage. Breathe so that your belly goes up and down. · Do not hold your breath. · Breathe like this for 5 to 10 minutes. Notice the feeling of calmness throughout your whole body. As you continue to breathe slowly and deeply, relax by doing the following for another 5 to 10 minutes:  · Tighten and relax each muscle group in your body. You can begin at your toes and work your way up to your head. · Imagine your muscle groups relaxing and becoming heavy. · Empty your mind of all thoughts. · Let yourself relax more and more deeply. · Become aware of the state of calmness that surrounds you. · When your relaxation time is over, you can bring yourself back to alertness by moving your fingers and toes and then your hands and feet and then stretching and moving your entire body. Sometimes people fall asleep during relaxation, but they usually wake up shortly afterward. · Always give yourself time to return to full alertness before you drive a car or do anything that might cause an accident if you are not fully alert.  Never play a relaxation tape while you drive a car. When should you call for help? Call 911 anytime you think you may need emergency care. For example, call if:  ? · You feel you cannot stop from hurting yourself or someone else. ? Keep the numbers for these national suicide hotlines: 3-353-074-TALK (1-392.617.9312) and 9-396-QSVRNSS (3-237.606.3392). If you or someone you know talks about suicide or feeling hopeless, get help right away. ? Watch closely for changes in your health, and be sure to contact your doctor if:  ? · You have anxiety or fear that affects your life. ? · You have symptoms of anxiety that are new or different from those you had before. Where can you learn more? Go to https://RED INNOVApejanyExplain My Surgeryeb.[employer]. org and sign in to your Woop!Wear account. Enter P754 in the Options Away box to learn more about \"Anxiety Disorder: Care Instructions. \"     If you do not have an account, please click on the \"Sign Up Now\" link. Current as of: May 12, 2017  Content Version: 11.5  © 5395-2569 Healthwise, Incorporated. Care instructions adapted under license by [facility] ([facility]). If you have questions about a medical condition or this instruction, always ask your healthcare professional. [address] any warranty or liability for your use of this information.

## 2018-01-18 ENCOUNTER — ANTI-COAG VISIT (OUTPATIENT)
Dept: FAMILY MEDICINE CLINIC | Age: 83
End: 2018-01-18

## 2018-01-18 DIAGNOSIS — Z79.01 LONG TERM CURRENT USE OF ANTICOAGULANT THERAPY: ICD-10-CM

## 2018-01-18 LAB
INTERNATIONAL NORMALIZATION RATIO, POC: 1.8
PROTHROMBIN TIME, POC: 21.3

## 2018-01-18 PROCEDURE — 85610 PROTHROMBIN TIME: CPT | Performed by: FAMILY MEDICINE

## 2018-01-25 ENCOUNTER — ANTI-COAG VISIT (OUTPATIENT)
Dept: FAMILY MEDICINE CLINIC | Age: 83
End: 2018-01-25

## 2018-01-25 DIAGNOSIS — I48.91 ATRIAL FIBRILLATION, UNSPECIFIED TYPE (HCC): Primary | ICD-10-CM

## 2018-01-25 DIAGNOSIS — Z79.01 LONG TERM CURRENT USE OF ANTICOAGULANT THERAPY: ICD-10-CM

## 2018-01-25 LAB
INTERNATIONAL NORMALIZATION RATIO, POC: 2.1
PROTHROMBIN TIME, POC: 25.8

## 2018-01-25 PROCEDURE — 85610 PROTHROMBIN TIME: CPT | Performed by: FAMILY MEDICINE

## 2018-01-25 NOTE — PROGRESS NOTES
POCT PT/INR PERFORMED, WITHIN NORMAL. PER DR, CONTINUE SAME DOSE OF 2 MG ON Monday AND 3 MG ALL OTHER DAYS AND REPEAT IN 2 WKS. PT INFORMED.   [facility]

## 2018-02-08 ENCOUNTER — ANTI-COAG VISIT (OUTPATIENT)
Dept: FAMILY MEDICINE CLINIC | Age: 83
End: 2018-02-08

## 2018-02-08 DIAGNOSIS — I48.91 ATRIAL FIBRILLATION, UNSPECIFIED TYPE (HCC): Primary | ICD-10-CM

## 2018-02-08 DIAGNOSIS — Z79.01 LONG TERM CURRENT USE OF ANTICOAGULANT THERAPY: ICD-10-CM

## 2018-02-08 LAB
INTERNATIONAL NORMALIZATION RATIO, POC: 2.4
PROTHROMBIN TIME, POC: 29

## 2018-02-08 PROCEDURE — 85610 PROTHROMBIN TIME: CPT | Performed by: FAMILY MEDICINE

## 2018-03-08 ENCOUNTER — OFFICE VISIT (OUTPATIENT)
Dept: FAMILY MEDICINE CLINIC | Age: 83
End: 2018-03-08

## 2018-03-08 VITALS
TEMPERATURE: 97.9 F | HEIGHT: 62 IN | OXYGEN SATURATION: 92 % | WEIGHT: 126.6 LBS | RESPIRATION RATE: 18 BRPM | HEART RATE: 66 BPM | SYSTOLIC BLOOD PRESSURE: 138 MMHG | DIASTOLIC BLOOD PRESSURE: 80 MMHG | BODY MASS INDEX: 23.3 KG/M2

## 2018-03-08 DIAGNOSIS — M19.90 ARTHRITIS: Primary | ICD-10-CM

## 2018-03-08 DIAGNOSIS — I48.91 ATRIAL FIBRILLATION, UNSPECIFIED TYPE (HCC): ICD-10-CM

## 2018-03-08 DIAGNOSIS — Z79.01 LONG TERM CURRENT USE OF ANTICOAGULANT THERAPY: ICD-10-CM

## 2018-03-08 LAB
INTERNATIONAL NORMALIZATION RATIO, POC: 2.4
PROTHROMBIN TIME, POC: 28.8

## 2018-03-08 PROCEDURE — 85610 PROTHROMBIN TIME: CPT | Performed by: FAMILY MEDICINE

## 2018-03-08 PROCEDURE — 99213 OFFICE O/P EST LOW 20 MIN: CPT | Performed by: FAMILY MEDICINE

## 2018-03-08 RX ORDER — TRAMADOL HYDROCHLORIDE 50 MG/1
50 TABLET ORAL EVERY 6 HOURS PRN
Qty: 120 TABLET | Refills: 0 | Status: SHIPPED | OUTPATIENT
Start: 2018-03-08 | End: 2018-05-29 | Stop reason: ALTCHOICE

## 2018-03-08 RX ORDER — TRAMADOL HYDROCHLORIDE 50 MG/1
TABLET ORAL
Qty: 120 TABLET | Refills: 0 | Status: SHIPPED | OUTPATIENT
Start: 2018-03-08 | End: 2018-06-18 | Stop reason: SDUPTHER

## 2018-03-08 ASSESSMENT — ENCOUNTER SYMPTOMS
CONSTIPATION: 0
SHORTNESS OF BREATH: 0
DIARRHEA: 0
BACK PAIN: 1
ABDOMINAL PAIN: 0

## 2018-03-08 NOTE — PROGRESS NOTES
[facility]  Clinic Note    Date: 3/8/2018                                               Subjective:     Chief Complaint   Patient presents with    Other     TRAMADOL REFILLED, PT/INR     HPI  On Tramadol since 2017 for severe chronic back pain. Takes at most 2 per day, sometimes none, depends on weather. Most of the time takes Tylenol. Already on NSAID, Celebrex with excellent results there as well. With Coumadin and Sotalol use has to be minful of potential for drug interactions. When she needs it, the Tramadol provides % of pain relief. Helps with walking as the main benefit, housework, etc.    No falls at home. Eats 5-6 prunes per day and stool soft to avoid constipation. Back hurts more on the right, low back, no sciatic pain. Coumadin 3 mg daily, 2 mg Monday, takes in evening. INR today theraputic at 2.4. Past Medical History:   Diagnosis Date    Atrial fibrillation ([address]     Cancer ([address]     gallbladder    Hypertension     Hypothyroidism     Leg DVT (deep venous thromboembolism), acute ([address]     Osteoarthritis     PE (pulmonary embolism)     Scoliosis      Past Surgical History:   Procedure Laterality Date   Shaniqua Sees  5,17,9196    with cholangiogram    ERCP  6/17/2013    with Sphinchterotomy/Stone Removal    HYSTERECTOMY      PACEMAKER INSERTION      UPPER GASTROINTESTINAL ENDOSCOPY  10/21/2016    esophageal tear     Anti-coag visit on 02/08/2018   Component Date Value Ref Range Status    INR 02/08/2018 2.4   Final    Protime 02/08/2018 29.0   Final     No family history on file. Current Outpatient Prescriptions   Medication Sig Dispense Refill    traMADol (ULTRAM) 50 MG tablet TAKE ONE TABLET BY MOUTH EVERY 6 HOURS AS NEEDED FOR PAIN. 120 tablet 0    traMADol (ULTRAM) 50 MG tablet Take 1 tablet by mouth every 6 hours as needed for Pain for up to 10 days.  120 tablet 0    guanFACINE (TENEX) 1 MG tablet TAKE ONE TABLET BY

## 2018-04-05 ENCOUNTER — ANTI-COAG VISIT (OUTPATIENT)
Dept: FAMILY MEDICINE CLINIC | Age: 83
End: 2018-04-05

## 2018-04-05 DIAGNOSIS — Z79.01 LONG TERM CURRENT USE OF ANTICOAGULANT THERAPY: ICD-10-CM

## 2018-04-05 DIAGNOSIS — I48.91 ATRIAL FIBRILLATION, UNSPECIFIED TYPE (HCC): ICD-10-CM

## 2018-04-05 LAB
INTERNATIONAL NORMALIZATION RATIO, POC: 2.4
PROTHROMBIN TIME, POC: 28.3

## 2018-04-05 PROCEDURE — 85610 PROTHROMBIN TIME: CPT | Performed by: FAMILY MEDICINE

## 2018-04-11 RX ORDER — LOSARTAN POTASSIUM 100 MG/1
TABLET ORAL
Qty: 30 TABLET | Refills: 5 | Status: SHIPPED | OUTPATIENT
Start: 2018-04-11 | End: 2018-08-15 | Stop reason: SDUPTHER

## 2018-05-03 ENCOUNTER — ANTI-COAG VISIT (OUTPATIENT)
Dept: FAMILY MEDICINE CLINIC | Age: 83
End: 2018-05-03

## 2018-05-03 DIAGNOSIS — Z79.01 LONG TERM CURRENT USE OF ANTICOAGULANT THERAPY: ICD-10-CM

## 2018-05-03 DIAGNOSIS — I48.91 ATRIAL FIBRILLATION, UNSPECIFIED TYPE (HCC): ICD-10-CM

## 2018-05-03 LAB
INTERNATIONAL NORMALIZATION RATIO, POC: 3.1
PROTHROMBIN TIME, POC: 37.3

## 2018-05-03 PROCEDURE — 85610 PROTHROMBIN TIME: CPT | Performed by: FAMILY MEDICINE

## 2018-05-17 ENCOUNTER — ANTI-COAG VISIT (OUTPATIENT)
Dept: FAMILY MEDICINE CLINIC | Age: 83
End: 2018-05-17

## 2018-05-17 DIAGNOSIS — I48.91 ATRIAL FIBRILLATION, UNSPECIFIED TYPE (HCC): ICD-10-CM

## 2018-05-17 DIAGNOSIS — Z79.01 LONG TERM CURRENT USE OF ANTICOAGULANT THERAPY: ICD-10-CM

## 2018-05-17 LAB
INR BLD: 4.35 (ref 0.85–1.15)
INTERNATIONAL NORMALIZATION RATIO, POC: 5
PROTHROMBIN TIME, POC: 59.7
PROTHROMBIN TIME: 49.2 SEC (ref 9.6–13)

## 2018-05-17 PROCEDURE — 85610 PROTHROMBIN TIME: CPT | Performed by: FAMILY MEDICINE

## 2018-05-24 ENCOUNTER — ANTI-COAG VISIT (OUTPATIENT)
Dept: FAMILY MEDICINE CLINIC | Age: 83
End: 2018-05-24

## 2018-05-24 DIAGNOSIS — I48.91 ATRIAL FIBRILLATION, UNSPECIFIED TYPE (HCC): ICD-10-CM

## 2018-05-24 DIAGNOSIS — Z79.01 LONG TERM CURRENT USE OF ANTICOAGULANT THERAPY: ICD-10-CM

## 2018-05-24 LAB
INTERNATIONAL NORMALIZATION RATIO, POC: 2.8
PROTHROMBIN TIME, POC: 33.1

## 2018-05-24 PROCEDURE — 85610 PROTHROMBIN TIME: CPT | Performed by: FAMILY MEDICINE

## 2018-05-26 DIAGNOSIS — M47.819 ARTHRITIS OF SPINE: ICD-10-CM

## 2018-05-29 ENCOUNTER — OFFICE VISIT (OUTPATIENT)
Dept: FAMILY MEDICINE CLINIC | Age: 83
End: 2018-05-29

## 2018-05-29 VITALS
HEART RATE: 70 BPM | BODY MASS INDEX: 22.93 KG/M2 | HEIGHT: 62 IN | OXYGEN SATURATION: 96 % | RESPIRATION RATE: 16 BRPM | TEMPERATURE: 98.2 F | WEIGHT: 124.6 LBS | DIASTOLIC BLOOD PRESSURE: 84 MMHG | SYSTOLIC BLOOD PRESSURE: 130 MMHG

## 2018-05-29 DIAGNOSIS — I10 ESSENTIAL HYPERTENSION: ICD-10-CM

## 2018-05-29 DIAGNOSIS — M47.819 ARTHRITIS OF SPINE: ICD-10-CM

## 2018-05-29 DIAGNOSIS — E87.6 HYPOKALEMIA: ICD-10-CM

## 2018-05-29 DIAGNOSIS — E83.10 DISORDER OF IRON METABOLISM: ICD-10-CM

## 2018-05-29 DIAGNOSIS — E87.1 HYPONATREMIA: ICD-10-CM

## 2018-05-29 DIAGNOSIS — E03.9 HYPOTHYROIDISM, UNSPECIFIED TYPE: ICD-10-CM

## 2018-05-29 DIAGNOSIS — R53.83 FATIGUE, UNSPECIFIED TYPE: Primary | ICD-10-CM

## 2018-05-29 DIAGNOSIS — J30.89 ENVIRONMENTAL AND SEASONAL ALLERGIES: ICD-10-CM

## 2018-05-29 LAB
A/G RATIO: 1.9 (ref 1.1–2.2)
ALBUMIN SERPL-MCNC: 4.5 G/DL (ref 3.4–5)
ALP BLD-CCNC: 84 U/L (ref 40–129)
ALT SERPL-CCNC: 12 U/L (ref 10–40)
ANION GAP SERPL CALCULATED.3IONS-SCNC: 12 MMOL/L (ref 3–16)
AST SERPL-CCNC: 25 U/L (ref 15–37)
BASOPHILS ABSOLUTE: 0 K/UL (ref 0–0.2)
BASOPHILS RELATIVE PERCENT: 0.5 %
BILIRUB SERPL-MCNC: 0.5 MG/DL (ref 0–1)
BUN BLDV-MCNC: 15 MG/DL (ref 7–20)
CALCIUM SERPL-MCNC: 10 MG/DL (ref 8.3–10.6)
CHLORIDE BLD-SCNC: 98 MMOL/L (ref 99–110)
CO2: 26 MMOL/L (ref 21–32)
CREAT SERPL-MCNC: 0.9 MG/DL (ref 0.6–1.2)
EOSINOPHILS ABSOLUTE: 0.1 K/UL (ref 0–0.6)
EOSINOPHILS RELATIVE PERCENT: 0.8 %
FERRITIN: 60.9 NG/ML (ref 15–150)
FOLATE: >20 NG/ML (ref 4.78–24.2)
GFR AFRICAN AMERICAN: >60
GFR NON-AFRICAN AMERICAN: 59
GLOBULIN: 2.4 G/DL
GLUCOSE BLD-MCNC: 94 MG/DL (ref 70–99)
HCT VFR BLD CALC: 39.8 % (ref 36–48)
HEMOGLOBIN: 13.5 G/DL (ref 12–16)
IRON SATURATION: 32 % (ref 15–50)
IRON: 127 UG/DL (ref 37–145)
LYMPHOCYTES ABSOLUTE: 1.4 K/UL (ref 1–5.1)
LYMPHOCYTES RELATIVE PERCENT: 18 %
MCH RBC QN AUTO: 29.9 PG (ref 26–34)
MCHC RBC AUTO-ENTMCNC: 33.8 G/DL (ref 31–36)
MCV RBC AUTO: 88.6 FL (ref 80–100)
MONOCYTES ABSOLUTE: 0.8 K/UL (ref 0–1.3)
MONOCYTES RELATIVE PERCENT: 10.6 %
NEUTROPHILS ABSOLUTE: 5.6 K/UL (ref 1.7–7.7)
NEUTROPHILS RELATIVE PERCENT: 70.1 %
PDW BLD-RTO: 14.6 % (ref 12.4–15.4)
PLATELET # BLD: 313 K/UL (ref 135–450)
PMV BLD AUTO: 7.4 FL (ref 5–10.5)
POTASSIUM SERPL-SCNC: 5.3 MMOL/L (ref 3.5–5.1)
RBC # BLD: 4.5 M/UL (ref 4–5.2)
SODIUM BLD-SCNC: 136 MMOL/L (ref 136–145)
TOTAL IRON BINDING CAPACITY: 395 UG/DL (ref 260–445)
TOTAL PROTEIN: 6.9 G/DL (ref 6.4–8.2)
TSH REFLEX: 0.39 UIU/ML (ref 0.27–4.2)
VITAMIN B-12: 584 PG/ML (ref 211–911)
WBC # BLD: 7.9 K/UL (ref 4–11)

## 2018-05-29 PROCEDURE — 36415 COLL VENOUS BLD VENIPUNCTURE: CPT | Performed by: FAMILY MEDICINE

## 2018-05-29 PROCEDURE — 99214 OFFICE O/P EST MOD 30 MIN: CPT | Performed by: FAMILY MEDICINE

## 2018-05-29 RX ORDER — CETIRIZINE HYDROCHLORIDE 5 MG/1
5 TABLET ORAL DAILY PRN
Qty: 30 TABLET | Refills: 0 | Status: SHIPPED | OUTPATIENT
Start: 2018-05-29 | End: 2018-06-24 | Stop reason: SDUPTHER

## 2018-05-29 RX ORDER — AMLODIPINE BESYLATE 5 MG/1
TABLET ORAL
Qty: 90 TABLET | Refills: 1 | Status: SHIPPED | OUTPATIENT
Start: 2018-05-29 | End: 2018-11-08 | Stop reason: ALTCHOICE

## 2018-05-29 RX ORDER — CELECOXIB 200 MG/1
CAPSULE ORAL
Qty: 90 CAPSULE | OUTPATIENT
Start: 2018-05-29

## 2018-05-29 RX ORDER — AMLODIPINE BESYLATE 5 MG/1
TABLET ORAL
Qty: 90 TABLET | OUTPATIENT
Start: 2018-05-29

## 2018-05-29 RX ORDER — CELECOXIB 200 MG/1
CAPSULE ORAL
Qty: 90 CAPSULE | Refills: 1 | Status: SHIPPED | OUTPATIENT
Start: 2018-05-29 | End: 2018-08-20

## 2018-05-29 ASSESSMENT — ENCOUNTER SYMPTOMS
ABDOMINAL PAIN: 0
SHORTNESS OF BREATH: 0
EYE PAIN: 0
COUGH: 0
WHEEZING: 0
DIARRHEA: 0
COLOR CHANGE: 0
VOMITING: 0
PHOTOPHOBIA: 0
RHINORRHEA: 1

## 2018-05-29 ASSESSMENT — PATIENT HEALTH QUESTIONNAIRE - PHQ9
SUM OF ALL RESPONSES TO PHQ9 QUESTIONS 1 & 2: 0
2. FEELING DOWN, DEPRESSED OR HOPELESS: 0
1. LITTLE INTEREST OR PLEASURE IN DOING THINGS: 0
SUM OF ALL RESPONSES TO PHQ QUESTIONS 1-9: 0

## 2018-05-30 ENCOUNTER — TELEPHONE (OUTPATIENT)
Dept: FAMILY MEDICINE CLINIC | Age: 83
End: 2018-05-30

## 2018-05-30 DIAGNOSIS — I10 ESSENTIAL HYPERTENSION: Primary | ICD-10-CM

## 2018-06-07 ENCOUNTER — ANTI-COAG VISIT (OUTPATIENT)
Dept: FAMILY MEDICINE CLINIC | Age: 83
End: 2018-06-07

## 2018-06-07 DIAGNOSIS — Z79.01 LONG TERM CURRENT USE OF ANTICOAGULANT THERAPY: ICD-10-CM

## 2018-06-07 DIAGNOSIS — I48.91 ATRIAL FIBRILLATION, UNSPECIFIED TYPE (HCC): ICD-10-CM

## 2018-06-07 LAB
INTERNATIONAL NORMALIZATION RATIO, POC: 2.7
PROTHROMBIN TIME, POC: 32.8

## 2018-06-07 PROCEDURE — 85610 PROTHROMBIN TIME: CPT | Performed by: FAMILY MEDICINE

## 2018-06-18 DIAGNOSIS — M19.90 ARTHRITIS: ICD-10-CM

## 2018-06-18 RX ORDER — TRAMADOL HYDROCHLORIDE 50 MG/1
TABLET ORAL
Qty: 120 TABLET | Refills: 0 | Status: SHIPPED | OUTPATIENT
Start: 2018-06-18 | End: 2018-08-30 | Stop reason: SDUPTHER

## 2018-07-05 ENCOUNTER — OFFICE VISIT (OUTPATIENT)
Dept: FAMILY MEDICINE CLINIC | Age: 83
End: 2018-07-05

## 2018-07-05 VITALS
SYSTOLIC BLOOD PRESSURE: 138 MMHG | TEMPERATURE: 98.2 F | BODY MASS INDEX: 23.66 KG/M2 | OXYGEN SATURATION: 97 % | HEART RATE: 78 BPM | WEIGHT: 128.6 LBS | HEIGHT: 62 IN | DIASTOLIC BLOOD PRESSURE: 72 MMHG | RESPIRATION RATE: 18 BRPM

## 2018-07-05 DIAGNOSIS — Z79.01 LONG TERM CURRENT USE OF ANTICOAGULANT THERAPY: ICD-10-CM

## 2018-07-05 DIAGNOSIS — I48.91 ATRIAL FIBRILLATION, UNSPECIFIED TYPE (HCC): ICD-10-CM

## 2018-07-05 DIAGNOSIS — E87.5 HYPERKALEMIA: Primary | ICD-10-CM

## 2018-07-05 LAB
ANION GAP SERPL CALCULATED.3IONS-SCNC: 17 MMOL/L (ref 3–16)
BUN BLDV-MCNC: 22 MG/DL (ref 7–20)
CALCIUM SERPL-MCNC: 10.2 MG/DL (ref 8.3–10.6)
CHLORIDE BLD-SCNC: 97 MMOL/L (ref 99–110)
CO2: 21 MMOL/L (ref 21–32)
CREAT SERPL-MCNC: 1 MG/DL (ref 0.6–1.2)
GFR AFRICAN AMERICAN: >60
GFR NON-AFRICAN AMERICAN: 52
GLUCOSE BLD-MCNC: 96 MG/DL (ref 70–99)
INTERNATIONAL NORMALIZATION RATIO, POC: 2.7
POTASSIUM SERPL-SCNC: 5.3 MMOL/L (ref 3.5–5.1)
PROTHROMBIN TIME, POC: 32.4
SODIUM BLD-SCNC: 135 MMOL/L (ref 136–145)

## 2018-07-05 PROCEDURE — 1036F TOBACCO NON-USER: CPT | Performed by: REGISTERED NURSE

## 2018-07-05 PROCEDURE — 99213 OFFICE O/P EST LOW 20 MIN: CPT | Performed by: REGISTERED NURSE

## 2018-07-05 PROCEDURE — 36415 COLL VENOUS BLD VENIPUNCTURE: CPT | Performed by: REGISTERED NURSE

## 2018-07-05 PROCEDURE — 4040F PNEUMOC VAC/ADMIN/RCVD: CPT | Performed by: REGISTERED NURSE

## 2018-07-05 PROCEDURE — G8427 DOCREV CUR MEDS BY ELIG CLIN: HCPCS | Performed by: REGISTERED NURSE

## 2018-07-05 PROCEDURE — 1090F PRES/ABSN URINE INCON ASSESS: CPT | Performed by: REGISTERED NURSE

## 2018-07-05 PROCEDURE — 85610 PROTHROMBIN TIME: CPT | Performed by: REGISTERED NURSE

## 2018-07-05 PROCEDURE — 1123F ACP DISCUSS/DSCN MKR DOCD: CPT | Performed by: REGISTERED NURSE

## 2018-07-05 PROCEDURE — G8420 CALC BMI NORM PARAMETERS: HCPCS | Performed by: REGISTERED NURSE

## 2018-07-05 ASSESSMENT — ENCOUNTER SYMPTOMS
COUGH: 0
SHORTNESS OF BREATH: 0
WHEEZING: 0
CHEST TIGHTNESS: 0

## 2018-07-05 ASSESSMENT — PATIENT HEALTH QUESTIONNAIRE - PHQ9
SUM OF ALL RESPONSES TO PHQ9 QUESTIONS 1 & 2: 0
1. LITTLE INTEREST OR PLEASURE IN DOING THINGS: 0
SUM OF ALL RESPONSES TO PHQ QUESTIONS 1-9: 0
2. FEELING DOWN, DEPRESSED OR HOPELESS: 0

## 2018-07-05 NOTE — PROGRESS NOTES
[facility]  Clinic Note    Date: 7/5/2018                                               Subjective/Objective:     Chief Complaint   Patient presents with    Follow-up     CHECK UP WITH INR FUTURE LAB TO BE COLLECTED. HPI Patient presents for hyperkalemia follow up. Was found to have high potassium- 5.3- on 5/29. Has been working on eating a diet low in potassium- limiting bananas and potatos. No CP, palpitations, SOB, or muscle cramps. Due for INR today.          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    Sleep disorder 31/91/4368    Diastolic dysfunction, left ventricle 10/28/2014    DJD (degenerative joint disease) of cervical spine 05/27/2014    DJD (degenerative joint disease) of thoracic spine 05/27/2014    DJD (degenerative joint disease), lumbosacral 05/27/2014    Osteoarthritis of lumbar spine 05/27/2014    Hypertension 09/04/2013    Adenocarcinoma of gallbladder ([address] 09/04/2013    Choledocholithiasis with obstruction 06/15/2013    Cardiac pacemaker in situ 03/26/2013    CHB (complete heart block) ([address] 03/26/2013    SSS (sick sinus syndrome) ([address] 03/26/2013    Hypothyroidism 09/18/2012    Hypercholesteremia 09/18/2012    Atrial fibrillation ([address] 05/05/2011    Long term current use of anticoagulant therapy 05/05/2011    Venous (peripheral) insufficiency 10/04/2010    Encounter for long-term (current) use of other medications 06/02/2010    Allergic rhinitis 12/27/2008    Essential hypertension, benign 12/27/2008    Female stress incontinence 12/27/2008    Iron deficiency anemia 12/27/2008    Phlebitis and thrombophlebitis of other deep vessels of lower extremities 12/27/2008    Pernicious anemia 12/27/2008    Other pulmonary embolism and infarction 12/27/2008       Past Medical History:   Diagnosis Date    Atrial fibrillation (HCC)     Cancer (HCC)     gallbladder    Hypertension     Hypothyroidism     Leg DVT (deep venous thromboembolism), acute ([address]     Osteoarthritis     PE (pulmonary embolism)     Scoliosis        Past Surgical History:   Procedure Laterality Date   Flash 89  3,05,3619    with cholangiogram    ERCP  6/17/2013    with Sphinchterotomy/Stone Removal    HYSTERECTOMY      PACEMAKER INSERTION      UPPER GASTROINTESTINAL ENDOSCOPY  10/21/2016    esophageal tear       Anti-coag visit on 06/07/2018   Component Date Value Ref Range Status    INR 06/07/2018 2.7   Final    Protime 06/07/2018 32.8   Final       No family history on file. Current Outpatient Prescriptions   Medication Sig Dispense Refill    cetirizine (ZYRTEC) 5 MG tablet TAKE ONE TABLET BY MOUTH DAILY AS NEEDED FOR ALLERGIES 30 tablet 3    traMADol (ULTRAM) 50 MG tablet TAKE ONE TABLET BY MOUTH EVERY 6 HOURS AS NEEDED FOR PAIN. 120 tablet 0    celecoxib (CELEBREX) 200 MG capsule TAKE 1 CAPSULE BY MOUTH  DAILY 90 capsule 1    amLODIPine (NORVASC) 5 MG tablet TAKE 1 TABLET BY MOUTH  DAILY 90 tablet 1    losartan (COZAAR) 100 MG tablet TAKE ONE TABLET BY MOUTH DAILY AS DIRECTED 30 tablet 5    warfarin (COUMADIN) 2 MG tablet TAKE 1 TO 1 AND 1/2 TABLETS BY MOUTH DAILY AS DIRECTED  .  RESTART WARFARIN ON  MONDAY 135 tablet 3    levothyroxine (SYNTHROID) 75 MCG tablet TAKE 1 TABLET BY MOUTH  DAILY 90 tablet 5    furosemide (LASIX) 20 MG tablet Take 1 tablet by mouth every other day 30 tablet 3    spironolactone (ALDACTONE) 25 MG tablet Take 1 tablet by mouth daily 90 tablet 3    Probiotic Product (PROBIOTIC ADVANCED PO) Take by mouth daily      omeprazole 20 MG EC tablet Take 20 mg by mouth daily      acetaminophen (TYLENOL) 500 MG tablet Take 500 mg by mouth every 6 hours as needed for Pain (TAKING TWICE DAILY)      sotalol AF 80 MG TABS Take 80 mg by mouth 2 times daily.  Multiple Vitamins-Minerals (THERAPEUTIC MULTIVITAMIN-MINERALS) tablet Take 1 tablet by mouth daily.  Calcium Citrate-Vitamin D 200-250 MG-UNIT TABS Take 500 mg by mouth daily.  Calcium Carbonate Antacid (MAALOX) 600 MG CHEW Take 1 tablet by mouth daily as needed.  diphenhydrAMINE (BENADRYL) 25 MG tablet Take 25 mg by mouth nightly as needed. No current facility-administered medications for this visit. Allergies   Allergen Reactions    Demerol     Dilaudid [Hydromorphone Hcl]     Hydrochlorothiazide     Macrobid [Nitrofurantoin Monohydrate Macrocrystals]     Other      FRESH FROZEN PLASMA- CAUSED SWELLING OF EYES AND MOUTH, HIVES!  Tetanus Toxoids        Review of Systems   Constitutional: Negative for chills, diaphoresis, fatigue and fever. Respiratory: Negative for cough, chest tightness, shortness of breath and wheezing. Cardiovascular: Negative for chest pain and palpitations. Musculoskeletal: Negative for myalgias. All other systems reviewed and are negative. Vitals:  /72 (Site: Right Arm, Position: Sitting, Cuff Size: Medium Adult)   Pulse 78   Temp 98.2 °F (36.8 °C) (Oral)   Resp 18   Ht 5' 2\" (1.575 m) Comment: with shoes  Wt 128 lb 9.6 oz (58.3 kg) Comment: with shoes  SpO2 97%   Breastfeeding? No   BMI 23.52 kg/m²     Physical Exam   Constitutional: She is oriented to person, place, and time. She appears well-developed and well-nourished. Cardiovascular: Normal rate, regular rhythm, normal heart sounds and intact distal pulses. Pulmonary/Chest: Effort normal and breath sounds normal.   Neurological: She is alert and oriented to person, place, and time. Skin: Skin is warm and dry. Psychiatric: She has a normal mood and affect.  Her behavior is normal. Judgment and thought content normal. Nursing note and vitals reviewed. Assessment/Plan     1. Hyperkalemia  Will recheck K. Given education on hyper and hypokalemia and K rich and restricted diet. - Basic Metabolic Panel; Future  - Basic Metabolic Panel    2. Atrial fibrillation, unspecified type (HCC)  Due.  - POCT INR    3. Long term current use of anticoagulant therapy  - POCT INR      Orders Placed This Encounter   Procedures    Basic Metabolic Panel     Standing Status:   Future     Number of Occurrences:   1     Standing Expiration Date:   7/5/2019       Return in about 3 months (around 10/5/2018), or if symptoms worsen or fail to improve, for With Dr. Tawny Collado- Tramadol.     Yen Starr NP    7/5/2018  2:12 PM

## 2018-07-05 NOTE — PATIENT INSTRUCTIONS
broccoli, milk, potatoes, and tomatoes. · Low potassium foods include blueberries, raspberries, cucumber, white or brown rice, spaghetti, and macaroni. · Do not use a salt substitute without talking to your doctor first. Most of these are very high in potassium. · Be sure to tell your doctor about any prescription, over-the-counter, or herbal medicines you take. Some of these can raise potassium. When should you call for help? Call 911 anytime you think you may need emergency care. For example, call if:    · You passed out (lost consciousness).     · You have an unusual heartbeat. Your heart may beat fast or skip beats.    Call your doctor now or seek immediate medical care if:    · You have muscle aches.     · You feel very weak.    Watch closely for changes in your health, and be sure to contact your doctor if:    · You do not get better as expected. Where can you learn more? Go to https://Kisstixx.Nanomed Pharameceuticals. org and sign in to your Make Music TV account. Enter C103 in the PASSNFLY box to learn more about \"Hyperkalemia: Care Instructions. \"     If you do not have an account, please click on the \"Sign Up Now\" link. Current as of: May 12, 2017  Content Version: 11.6  © 2214-5375 Biom'Up. Care instructions adapted under license by [facility] ([facility]). If you have questions about a medical condition or this instruction, always ask your healthcare professional. [place] [address] any warranty or liability for your use of this information. Patient Education        Hypokalemia: Care Instructions  Your Care Instructions    Hypokalemia (say \"sv-cq-oww-SHIKHA-jame-uh\") is a low level of potassium. The heart, muscles, kidneys, and nervous system all need potassium to work well. This problem has many different causes. Kidney problems, diet, and medicines like diuretics and laxatives can cause it. So can vomiting or diarrhea. In some cases, cancer is the cause.  Your a key part of your treatment and safety. Be sure to make and go to all appointments, and call your doctor if you are having problems. It's also a good idea to know your test results and keep a list of the medicines you take. How can you care for yourself at home? · Plan your diet around foods that are rich in potassium. Fresh, unprocessed whole foods have the most. These foods include:  ¨ Milk and other dairy products. ¨ Vegetables, especially broccoli, cooked dry beans, tomatoes, potatoes, artichokes, winter squash, and spinach. ¨ Fruits, especially citrus fruits, bananas, and apricots. Dried apricots contain more potassium than fresh apricots. ¨ Meat, poultry, and fish. · Ask your doctor about using a salt substitute or \"light\" salt. These often contain potassium. Where can you learn more? Go to https://Arista Power.Advanced Northern Graphite Leaders. org and sign in to your Rhapsody account. Enter H315 in the [employer] box to learn more about \"Potassium-Rich Diet: Care Instructions. \"     If you do not have an account, please click on the \"Sign Up Now\" link. Current as of: May 12, 2017  Content Version: 11.6  © 8618-7097 [employer] Hunter, Incorporated. Care instructions adapted under license by [facility] ([facility]). If you have questions about a medical condition or this instruction, always ask your healthcare professional. [place] [address] any warranty or liability for your use of this information.

## 2018-07-06 DIAGNOSIS — E87.5 HYPERKALEMIA: Primary | ICD-10-CM

## 2018-08-02 ENCOUNTER — OFFICE VISIT (OUTPATIENT)
Dept: FAMILY MEDICINE CLINIC | Age: 83
End: 2018-08-02

## 2018-08-02 VITALS
SYSTOLIC BLOOD PRESSURE: 118 MMHG | HEART RATE: 74 BPM | TEMPERATURE: 98.2 F | RESPIRATION RATE: 18 BRPM | OXYGEN SATURATION: 96 % | DIASTOLIC BLOOD PRESSURE: 68 MMHG | BODY MASS INDEX: 23.66 KG/M2 | HEIGHT: 62 IN | WEIGHT: 128.6 LBS

## 2018-08-02 DIAGNOSIS — I10 ESSENTIAL HYPERTENSION: Primary | ICD-10-CM

## 2018-08-02 DIAGNOSIS — I48.91 ATRIAL FIBRILLATION, UNSPECIFIED TYPE (HCC): ICD-10-CM

## 2018-08-02 DIAGNOSIS — Z79.01 LONG TERM CURRENT USE OF ANTICOAGULANT THERAPY: ICD-10-CM

## 2018-08-02 DIAGNOSIS — J30.89 ENVIRONMENTAL AND SEASONAL ALLERGIES: ICD-10-CM

## 2018-08-02 LAB
ANION GAP SERPL CALCULATED.3IONS-SCNC: 13 MMOL/L (ref 3–16)
BUN BLDV-MCNC: 27 MG/DL (ref 7–20)
CALCIUM SERPL-MCNC: 10.1 MG/DL (ref 8.3–10.6)
CHLORIDE BLD-SCNC: 95 MMOL/L (ref 99–110)
CO2: 25 MMOL/L (ref 21–32)
CREAT SERPL-MCNC: 1.1 MG/DL (ref 0.6–1.2)
GFR AFRICAN AMERICAN: 56
GFR NON-AFRICAN AMERICAN: 47
GLUCOSE BLD-MCNC: 136 MG/DL (ref 70–99)
POTASSIUM SERPL-SCNC: 4.4 MMOL/L (ref 3.5–5.1)
SODIUM BLD-SCNC: 133 MMOL/L (ref 136–145)

## 2018-08-02 PROCEDURE — G8420 CALC BMI NORM PARAMETERS: HCPCS | Performed by: FAMILY MEDICINE

## 2018-08-02 PROCEDURE — 1123F ACP DISCUSS/DSCN MKR DOCD: CPT | Performed by: FAMILY MEDICINE

## 2018-08-02 PROCEDURE — 99213 OFFICE O/P EST LOW 20 MIN: CPT | Performed by: FAMILY MEDICINE

## 2018-08-02 PROCEDURE — 1036F TOBACCO NON-USER: CPT | Performed by: FAMILY MEDICINE

## 2018-08-02 PROCEDURE — 1101F PT FALLS ASSESS-DOCD LE1/YR: CPT | Performed by: FAMILY MEDICINE

## 2018-08-02 PROCEDURE — 4040F PNEUMOC VAC/ADMIN/RCVD: CPT | Performed by: FAMILY MEDICINE

## 2018-08-02 PROCEDURE — G8427 DOCREV CUR MEDS BY ELIG CLIN: HCPCS | Performed by: FAMILY MEDICINE

## 2018-08-02 PROCEDURE — 85610 PROTHROMBIN TIME: CPT | Performed by: FAMILY MEDICINE

## 2018-08-02 PROCEDURE — 1090F PRES/ABSN URINE INCON ASSESS: CPT | Performed by: FAMILY MEDICINE

## 2018-08-02 PROCEDURE — 36415 COLL VENOUS BLD VENIPUNCTURE: CPT | Performed by: FAMILY MEDICINE

## 2018-08-02 RX ORDER — CETIRIZINE HYDROCHLORIDE 5 MG/1
TABLET ORAL
Qty: 60 TABLET | Refills: 0
Start: 2018-08-02 | End: 2018-08-30 | Stop reason: SDUPTHER

## 2018-08-02 ASSESSMENT — ENCOUNTER SYMPTOMS
ABDOMINAL PAIN: 0
COUGH: 0
WHEEZING: 0
CONSTIPATION: 1
VOMITING: 0
RHINORRHEA: 1
SHORTNESS OF BREATH: 0

## 2018-08-03 LAB
INTERNATIONAL NORMALIZATION RATIO, POC: 3
PROTHROMBIN TIME, POC: 36.1

## 2018-08-07 ENCOUNTER — TELEPHONE (OUTPATIENT)
Dept: FAMILY MEDICINE CLINIC | Age: 83
End: 2018-08-07

## 2018-08-07 DIAGNOSIS — N28.9 RENAL INSUFFICIENCY: ICD-10-CM

## 2018-08-15 RX ORDER — LOSARTAN POTASSIUM 100 MG/1
TABLET ORAL
Qty: 90 TABLET | Refills: 1 | Status: SHIPPED | OUTPATIENT
Start: 2018-08-15 | End: 2019-01-08

## 2018-08-30 ENCOUNTER — OFFICE VISIT (OUTPATIENT)
Dept: FAMILY MEDICINE CLINIC | Age: 83
End: 2018-08-30

## 2018-08-30 VITALS
WEIGHT: 130 LBS | DIASTOLIC BLOOD PRESSURE: 80 MMHG | SYSTOLIC BLOOD PRESSURE: 124 MMHG | HEIGHT: 62 IN | BODY MASS INDEX: 23.92 KG/M2

## 2018-08-30 DIAGNOSIS — M19.90 ARTHRITIS: ICD-10-CM

## 2018-08-30 DIAGNOSIS — I48.91 ATRIAL FIBRILLATION, UNSPECIFIED TYPE (HCC): ICD-10-CM

## 2018-08-30 DIAGNOSIS — J31.0 CHRONIC RHINITIS: ICD-10-CM

## 2018-08-30 DIAGNOSIS — J30.89 ENVIRONMENTAL AND SEASONAL ALLERGIES: ICD-10-CM

## 2018-08-30 DIAGNOSIS — I10 ESSENTIAL HYPERTENSION: Primary | ICD-10-CM

## 2018-08-30 DIAGNOSIS — Z79.01 LONG TERM CURRENT USE OF ANTICOAGULANT THERAPY: ICD-10-CM

## 2018-08-30 LAB
INTERNATIONAL NORMALIZATION RATIO, POC: 2.4
PROTHROMBIN TIME, POC: 28.9

## 2018-08-30 PROCEDURE — 1101F PT FALLS ASSESS-DOCD LE1/YR: CPT | Performed by: FAMILY MEDICINE

## 2018-08-30 PROCEDURE — G8427 DOCREV CUR MEDS BY ELIG CLIN: HCPCS | Performed by: FAMILY MEDICINE

## 2018-08-30 PROCEDURE — 1123F ACP DISCUSS/DSCN MKR DOCD: CPT | Performed by: FAMILY MEDICINE

## 2018-08-30 PROCEDURE — 1090F PRES/ABSN URINE INCON ASSESS: CPT | Performed by: FAMILY MEDICINE

## 2018-08-30 PROCEDURE — 99214 OFFICE O/P EST MOD 30 MIN: CPT | Performed by: FAMILY MEDICINE

## 2018-08-30 PROCEDURE — 85610 PROTHROMBIN TIME: CPT | Performed by: FAMILY MEDICINE

## 2018-08-30 PROCEDURE — 4040F PNEUMOC VAC/ADMIN/RCVD: CPT | Performed by: FAMILY MEDICINE

## 2018-08-30 PROCEDURE — G8420 CALC BMI NORM PARAMETERS: HCPCS | Performed by: FAMILY MEDICINE

## 2018-08-30 PROCEDURE — 1036F TOBACCO NON-USER: CPT | Performed by: FAMILY MEDICINE

## 2018-08-30 RX ORDER — SPIRONOLACTONE 25 MG/1
25 TABLET ORAL DAILY
Qty: 90 TABLET | Refills: 3 | Status: SHIPPED | OUTPATIENT
Start: 2018-08-30 | End: 2019-04-10 | Stop reason: SDUPTHER

## 2018-08-30 RX ORDER — TRAMADOL HYDROCHLORIDE 50 MG/1
TABLET ORAL
Qty: 120 TABLET | Refills: 2 | Status: SHIPPED | OUTPATIENT
Start: 2018-08-30 | End: 2019-02-01 | Stop reason: SDUPTHER

## 2018-08-30 RX ORDER — CETIRIZINE HYDROCHLORIDE 5 MG/1
TABLET ORAL
Qty: 60 TABLET | Refills: 5 | Status: SHIPPED | OUTPATIENT
Start: 2018-08-30 | End: 2019-07-11 | Stop reason: ALTCHOICE

## 2018-09-06 ENCOUNTER — ANTI-COAG VISIT (OUTPATIENT)
Dept: FAMILY MEDICINE CLINIC | Age: 83
End: 2018-09-06

## 2018-09-06 DIAGNOSIS — I48.91 ATRIAL FIBRILLATION, UNSPECIFIED TYPE (HCC): ICD-10-CM

## 2018-09-06 DIAGNOSIS — Z79.01 LONG TERM CURRENT USE OF ANTICOAGULANT THERAPY: ICD-10-CM

## 2018-09-06 LAB
INTERNATIONAL NORMALIZATION RATIO, POC: 1.3
PROTHROMBIN TIME, POC: 15.7

## 2018-09-06 PROCEDURE — 85610 PROTHROMBIN TIME: CPT | Performed by: FAMILY MEDICINE

## 2018-11-08 ENCOUNTER — OFFICE VISIT (OUTPATIENT)
Dept: FAMILY MEDICINE CLINIC | Age: 83
End: 2018-11-08
Payer: MEDICARE

## 2018-11-08 VITALS
DIASTOLIC BLOOD PRESSURE: 76 MMHG | OXYGEN SATURATION: 98 % | BODY MASS INDEX: 24.25 KG/M2 | SYSTOLIC BLOOD PRESSURE: 124 MMHG | RESPIRATION RATE: 16 BRPM | WEIGHT: 131.8 LBS | HEIGHT: 62 IN | HEART RATE: 80 BPM

## 2018-11-08 DIAGNOSIS — D50.9 IRON DEFICIENCY ANEMIA, UNSPECIFIED IRON DEFICIENCY ANEMIA TYPE: ICD-10-CM

## 2018-11-08 DIAGNOSIS — E78.00 HYPERCHOLESTEREMIA: ICD-10-CM

## 2018-11-08 DIAGNOSIS — N18.30 CKD (CHRONIC KIDNEY DISEASE) STAGE 3, GFR 30-59 ML/MIN (HCC): ICD-10-CM

## 2018-11-08 DIAGNOSIS — D51.0 PERNICIOUS ANEMIA: ICD-10-CM

## 2018-11-08 DIAGNOSIS — J30.1 ALLERGIC RHINITIS DUE TO POLLEN, UNSPECIFIED SEASONALITY: ICD-10-CM

## 2018-11-08 DIAGNOSIS — E03.9 HYPOTHYROIDISM, UNSPECIFIED TYPE: ICD-10-CM

## 2018-11-08 DIAGNOSIS — I50.42 CHRONIC COMBINED SYSTOLIC AND DIASTOLIC CONGESTIVE HEART FAILURE (HCC): ICD-10-CM

## 2018-11-08 DIAGNOSIS — I48.91 ATRIAL FIBRILLATION, UNSPECIFIED TYPE (HCC): ICD-10-CM

## 2018-11-08 DIAGNOSIS — Z86.711 HX PULMONARY EMBOLISM: ICD-10-CM

## 2018-11-08 DIAGNOSIS — Z23 NEED FOR INFLUENZA VACCINATION: Primary | ICD-10-CM

## 2018-11-08 LAB
BASOPHILS ABSOLUTE: 0 K/UL (ref 0–0.2)
BASOPHILS RELATIVE PERCENT: 0.5 %
EOSINOPHILS ABSOLUTE: 0.1 K/UL (ref 0–0.6)
EOSINOPHILS RELATIVE PERCENT: 0.8 %
HCT VFR BLD CALC: 37.6 % (ref 36–48)
HEMOGLOBIN: 12.4 G/DL (ref 12–16)
LYMPHOCYTES ABSOLUTE: 1.5 K/UL (ref 1–5.1)
LYMPHOCYTES RELATIVE PERCENT: 18.8 %
MCH RBC QN AUTO: 29.2 PG (ref 26–34)
MCHC RBC AUTO-ENTMCNC: 33 G/DL (ref 31–36)
MCV RBC AUTO: 88.4 FL (ref 80–100)
MONOCYTES ABSOLUTE: 1 K/UL (ref 0–1.3)
MONOCYTES RELATIVE PERCENT: 12.7 %
NEUTROPHILS ABSOLUTE: 5.4 K/UL (ref 1.7–7.7)
NEUTROPHILS RELATIVE PERCENT: 67.2 %
PDW BLD-RTO: 14 % (ref 12.4–15.4)
PLATELET # BLD: 289 K/UL (ref 135–450)
PMV BLD AUTO: 7.3 FL (ref 5–10.5)
RBC # BLD: 4.25 M/UL (ref 4–5.2)
T4 FREE: 1.5 NG/DL (ref 0.9–1.8)
TSH SERPL DL<=0.05 MIU/L-ACNC: 0.46 UIU/ML (ref 0.27–4.2)
WBC # BLD: 8 K/UL (ref 4–11)

## 2018-11-08 PROCEDURE — 4040F PNEUMOC VAC/ADMIN/RCVD: CPT | Performed by: FAMILY MEDICINE

## 2018-11-08 PROCEDURE — 1036F TOBACCO NON-USER: CPT | Performed by: FAMILY MEDICINE

## 2018-11-08 PROCEDURE — 1123F ACP DISCUSS/DSCN MKR DOCD: CPT | Performed by: FAMILY MEDICINE

## 2018-11-08 PROCEDURE — 99214 OFFICE O/P EST MOD 30 MIN: CPT | Performed by: FAMILY MEDICINE

## 2018-11-08 PROCEDURE — G8427 DOCREV CUR MEDS BY ELIG CLIN: HCPCS | Performed by: FAMILY MEDICINE

## 2018-11-08 PROCEDURE — 90662 IIV NO PRSV INCREASED AG IM: CPT | Performed by: FAMILY MEDICINE

## 2018-11-08 PROCEDURE — 1101F PT FALLS ASSESS-DOCD LE1/YR: CPT | Performed by: FAMILY MEDICINE

## 2018-11-08 PROCEDURE — 1090F PRES/ABSN URINE INCON ASSESS: CPT | Performed by: FAMILY MEDICINE

## 2018-11-08 PROCEDURE — G8482 FLU IMMUNIZE ORDER/ADMIN: HCPCS | Performed by: FAMILY MEDICINE

## 2018-11-08 PROCEDURE — G0008 ADMIN INFLUENZA VIRUS VAC: HCPCS | Performed by: FAMILY MEDICINE

## 2018-11-08 PROCEDURE — 36415 COLL VENOUS BLD VENIPUNCTURE: CPT | Performed by: FAMILY MEDICINE

## 2018-11-08 PROCEDURE — G8420 CALC BMI NORM PARAMETERS: HCPCS | Performed by: FAMILY MEDICINE

## 2018-11-08 RX ORDER — IPRATROPIUM BROMIDE 42 UG/1
1-2 SPRAY, METERED NASAL 3 TIMES DAILY PRN
Qty: 1 BOTTLE | Refills: 2 | Status: SHIPPED | OUTPATIENT
Start: 2018-11-08 | End: 2019-06-12 | Stop reason: SDUPTHER

## 2018-11-09 LAB
A/G RATIO: 2 (ref 1.1–2.2)
ALBUMIN SERPL-MCNC: 4.5 G/DL (ref 3.4–5)
ALP BLD-CCNC: 96 U/L (ref 40–129)
ALT SERPL-CCNC: 10 U/L (ref 10–40)
ANION GAP SERPL CALCULATED.3IONS-SCNC: 14 MMOL/L (ref 3–16)
AST SERPL-CCNC: 24 U/L (ref 15–37)
BILIRUB SERPL-MCNC: 0.4 MG/DL (ref 0–1)
BUN BLDV-MCNC: 18 MG/DL (ref 7–20)
CALCIUM SERPL-MCNC: 10.4 MG/DL (ref 8.3–10.6)
CHLORIDE BLD-SCNC: 106 MMOL/L (ref 99–110)
CO2: 23 MMOL/L (ref 21–32)
CREAT SERPL-MCNC: 0.9 MG/DL (ref 0.6–1.2)
GFR AFRICAN AMERICAN: >60
GFR NON-AFRICAN AMERICAN: 59
GLOBULIN: 2.2 G/DL
GLUCOSE BLD-MCNC: 100 MG/DL (ref 70–99)
POTASSIUM SERPL-SCNC: 4.4 MMOL/L (ref 3.5–5.1)
SODIUM BLD-SCNC: 143 MMOL/L (ref 136–145)
TOTAL PROTEIN: 6.7 G/DL (ref 6.4–8.2)

## 2018-11-19 PROBLEM — N18.30 CKD (CHRONIC KIDNEY DISEASE), STAGE III (HCC): Status: ACTIVE | Noted: 2018-11-19

## 2018-11-19 PROBLEM — I10 ESSENTIAL HYPERTENSION: Status: ACTIVE | Noted: 2018-11-19

## 2019-01-08 RX ORDER — OLMESARTAN MEDOXOMIL 40 MG/1
40 TABLET ORAL DAILY
Qty: 30 TABLET | Refills: 2 | Status: SHIPPED | OUTPATIENT
Start: 2019-01-08 | End: 2019-02-05

## 2019-01-14 RX ORDER — LEVOTHYROXINE SODIUM 0.07 MG/1
TABLET ORAL
Qty: 90 TABLET | Refills: 2 | Status: SHIPPED | OUTPATIENT
Start: 2019-01-14 | End: 2019-07-29 | Stop reason: SDUPTHER

## 2019-02-11 ENCOUNTER — TELEPHONE (OUTPATIENT)
Dept: FAMILY MEDICINE CLINIC | Age: 84
End: 2019-02-11

## 2019-02-11 RX ORDER — IRBESARTAN 150 MG/1
150 TABLET ORAL DAILY
Qty: 30 TABLET | Refills: 5 | Status: SHIPPED | OUTPATIENT
Start: 2019-02-11 | End: 2019-02-12 | Stop reason: ALTCHOICE

## 2019-02-11 RX ORDER — CANDESARTAN 4 MG/1
4 TABLET ORAL DAILY
Qty: 30 TABLET | Refills: 0 | Status: SHIPPED | OUTPATIENT
Start: 2019-02-11 | End: 2019-02-11

## 2019-02-12 ENCOUNTER — TELEPHONE (OUTPATIENT)
Dept: FAMILY MEDICINE CLINIC | Age: 84
End: 2019-02-12

## 2019-02-12 RX ORDER — VALSARTAN 80 MG/1
80 TABLET ORAL DAILY
Qty: 30 TABLET | Refills: 5 | Status: SHIPPED | OUTPATIENT
Start: 2019-02-12 | End: 2019-07-11 | Stop reason: ALTCHOICE

## 2019-02-14 ENCOUNTER — OFFICE VISIT (OUTPATIENT)
Dept: FAMILY MEDICINE CLINIC | Age: 84
End: 2019-02-14
Payer: MEDICARE

## 2019-02-14 VITALS
DIASTOLIC BLOOD PRESSURE: 76 MMHG | BODY MASS INDEX: 24.11 KG/M2 | SYSTOLIC BLOOD PRESSURE: 120 MMHG | WEIGHT: 131 LBS | OXYGEN SATURATION: 99 % | HEIGHT: 62 IN | HEART RATE: 80 BPM

## 2019-02-14 DIAGNOSIS — I51.9 DIASTOLIC DYSFUNCTION, LEFT VENTRICLE: ICD-10-CM

## 2019-02-14 DIAGNOSIS — I49.5 SSS (SICK SINUS SYNDROME) (HCC): ICD-10-CM

## 2019-02-14 DIAGNOSIS — Z79.01 LONG TERM CURRENT USE OF ANTICOAGULANT THERAPY: ICD-10-CM

## 2019-02-14 DIAGNOSIS — E03.9 HYPOTHYROIDISM, UNSPECIFIED TYPE: ICD-10-CM

## 2019-02-14 DIAGNOSIS — R34 DECREASED URINE OUTPUT: ICD-10-CM

## 2019-02-14 DIAGNOSIS — I48.91 ATRIAL FIBRILLATION, UNSPECIFIED TYPE (HCC): ICD-10-CM

## 2019-02-14 DIAGNOSIS — N18.30 CKD (CHRONIC KIDNEY DISEASE) STAGE 3, GFR 30-59 ML/MIN (HCC): ICD-10-CM

## 2019-02-14 DIAGNOSIS — R44.1 VISUAL HALLUCINATIONS: ICD-10-CM

## 2019-02-14 DIAGNOSIS — I44.2 CHB (COMPLETE HEART BLOCK) (HCC): ICD-10-CM

## 2019-02-14 DIAGNOSIS — M47.812 SPONDYLOSIS OF CERVICAL REGION WITHOUT MYELOPATHY OR RADICULOPATHY: ICD-10-CM

## 2019-02-14 DIAGNOSIS — I10 ESSENTIAL HYPERTENSION: Primary | ICD-10-CM

## 2019-02-14 LAB
BILIRUBIN, POC: ABNORMAL
BLOOD URINE, POC: ABNORMAL
CLARITY, POC: CLEAR
COLOR, POC: YELLOW
GLUCOSE URINE, POC: ABNORMAL
KETONES, POC: ABNORMAL
LEUKOCYTE EST, POC: ABNORMAL
NITRITE, POC: ABNORMAL
PH, POC: 6
PROTEIN, POC: ABNORMAL
SPECIFIC GRAVITY, POC: 1.02
UROBILINOGEN, POC: 0.2

## 2019-02-14 PROCEDURE — 4040F PNEUMOC VAC/ADMIN/RCVD: CPT | Performed by: FAMILY MEDICINE

## 2019-02-14 PROCEDURE — G8427 DOCREV CUR MEDS BY ELIG CLIN: HCPCS | Performed by: FAMILY MEDICINE

## 2019-02-14 PROCEDURE — 1036F TOBACCO NON-USER: CPT | Performed by: FAMILY MEDICINE

## 2019-02-14 PROCEDURE — 1090F PRES/ABSN URINE INCON ASSESS: CPT | Performed by: FAMILY MEDICINE

## 2019-02-14 PROCEDURE — 1101F PT FALLS ASSESS-DOCD LE1/YR: CPT | Performed by: FAMILY MEDICINE

## 2019-02-14 PROCEDURE — 81002 URINALYSIS NONAUTO W/O SCOPE: CPT | Performed by: FAMILY MEDICINE

## 2019-02-14 PROCEDURE — G8482 FLU IMMUNIZE ORDER/ADMIN: HCPCS | Performed by: FAMILY MEDICINE

## 2019-02-14 PROCEDURE — G8420 CALC BMI NORM PARAMETERS: HCPCS | Performed by: FAMILY MEDICINE

## 2019-02-14 PROCEDURE — 99214 OFFICE O/P EST MOD 30 MIN: CPT | Performed by: FAMILY MEDICINE

## 2019-02-14 PROCEDURE — 1123F ACP DISCUSS/DSCN MKR DOCD: CPT | Performed by: FAMILY MEDICINE

## 2019-02-14 RX ORDER — OLANZAPINE 2.5 MG/1
2.5 TABLET ORAL NIGHTLY
Qty: 30 TABLET | Refills: 2 | Status: SHIPPED | OUTPATIENT
Start: 2019-02-14 | End: 2019-06-12

## 2019-02-14 RX ORDER — LISINOPRIL 20 MG/1
20 TABLET ORAL DAILY
Qty: 30 TABLET | Refills: 2 | Status: SHIPPED | OUTPATIENT
Start: 2019-02-14 | End: 2019-04-10 | Stop reason: SDUPTHER

## 2019-02-14 ASSESSMENT — PATIENT HEALTH QUESTIONNAIRE - PHQ9
2. FEELING DOWN, DEPRESSED OR HOPELESS: 0
1. LITTLE INTEREST OR PLEASURE IN DOING THINGS: 0
SUM OF ALL RESPONSES TO PHQ QUESTIONS 1-9: 0
SUM OF ALL RESPONSES TO PHQ QUESTIONS 1-9: 0
SUM OF ALL RESPONSES TO PHQ9 QUESTIONS 1 & 2: 0

## 2019-02-15 ENCOUNTER — TELEPHONE (OUTPATIENT)
Dept: FAMILY MEDICINE CLINIC | Age: 84
End: 2019-02-15

## 2019-02-16 LAB — URINE CULTURE, ROUTINE: NORMAL

## 2019-02-25 RX ORDER — RIVAROXABAN 15 MG/1
TABLET, FILM COATED ORAL
Qty: 30 TABLET | Refills: 4 | Status: SHIPPED | OUTPATIENT
Start: 2019-02-25 | End: 2019-04-11 | Stop reason: SDUPTHER

## 2019-03-14 ENCOUNTER — NURSE ONLY (OUTPATIENT)
Dept: FAMILY MEDICINE CLINIC | Age: 84
End: 2019-03-14
Payer: MEDICARE

## 2019-03-14 DIAGNOSIS — I10 ESSENTIAL HYPERTENSION: Primary | ICD-10-CM

## 2019-03-14 LAB
ANION GAP SERPL CALCULATED.3IONS-SCNC: 15 MMOL/L (ref 3–16)
BUN BLDV-MCNC: 15 MG/DL (ref 7–20)
CALCIUM SERPL-MCNC: 9.7 MG/DL (ref 8.3–10.6)
CHLORIDE BLD-SCNC: 101 MMOL/L (ref 99–110)
CO2: 27 MMOL/L (ref 21–32)
CREAT SERPL-MCNC: 1 MG/DL (ref 0.6–1.2)
GFR AFRICAN AMERICAN: >60
GFR NON-AFRICAN AMERICAN: 52
GLUCOSE BLD-MCNC: 114 MG/DL (ref 70–99)
POTASSIUM SERPL-SCNC: 4 MMOL/L (ref 3.5–5.1)
SODIUM BLD-SCNC: 143 MMOL/L (ref 136–145)

## 2019-03-14 PROCEDURE — 36415 COLL VENOUS BLD VENIPUNCTURE: CPT | Performed by: FAMILY MEDICINE

## 2019-04-10 RX ORDER — SPIRONOLACTONE 25 MG/1
25 TABLET ORAL DAILY
Qty: 90 TABLET | Refills: 3 | Status: SHIPPED | OUTPATIENT
Start: 2019-04-10 | End: 2020-04-06

## 2019-04-10 RX ORDER — LISINOPRIL 20 MG/1
20 TABLET ORAL DAILY
Qty: 90 TABLET | Refills: 3 | Status: SHIPPED | OUTPATIENT
Start: 2019-04-10 | End: 2019-04-22 | Stop reason: SDUPTHER

## 2019-04-22 RX ORDER — LISINOPRIL 20 MG/1
TABLET ORAL
Qty: 30 TABLET | Refills: 5 | Status: SHIPPED | OUTPATIENT
Start: 2019-04-22 | End: 2019-11-07 | Stop reason: ALTCHOICE

## 2019-04-22 NOTE — TELEPHONE ENCOUNTER
LAST 02/14/2019 WITH DR MUNIZ, NEXT VISIT NONE. LAST BMP 03/14/2019. [facility]        Component Value Ref Range & Units Status Collected Lab   Sodium 143  136 - 145 mmol/L Final 03/14/2019  1:30 PM [address] Lab   Potassium 4.0  3.5 - 5.1 mmol/L Final 03/14/2019  1:30 PM [address] Lab   Chloride 101  99 - 110 mmol/L Final 03/14/2019  1:30 PM [facility] Lab   CO2 27  21 - 32 mmol/L Final 03/14/2019  1:30 PM [facility] Lab   Anion Gap 15  3 - 16 Final 03/14/2019  1:30 PM [address] Lab   Glucose 114High   70 - 99 mg/dL Final 03/14/2019  1:30 PM [address] Lab   BUN 15  7 - 20 mg/dL Final 03/14/2019  1:30 PM [facility] Lab   CREATININE 1.0  0.6 - 1.2 mg/dL Final 03/14/2019  1:30 PM [address] Lab   GFR Non- 52Abnormal   >60 Final 03/14/2019  1:30 PM [address] Lab   >60 mL/min/1.73m2 EGFR, calc. for ages 25 and older using the   MDRD formula (not corrected for weight), is valid for stable   renal function. GFR  >60  >60 Final 03/14/2019  1:30 PM [address] Lab   Chronic Kidney Disease: less than 60 ml/min/1.73 sq. m.         Kidney Failure: less than 15 ml/min/1.73 sq.m. Results valid for patients 18 years and older.     Calcium 9.7  8.3 - 10.6 mg/dL Final 03/14/2019  1:30 PM [address] Lab

## 2019-05-13 DIAGNOSIS — M19.90 ARTHRITIS: ICD-10-CM

## 2019-05-13 RX ORDER — TRAMADOL HYDROCHLORIDE 50 MG/1
TABLET ORAL
Qty: 120 TABLET | Refills: 0 | Status: SHIPPED | OUTPATIENT
Start: 2019-05-13 | End: 2019-07-23 | Stop reason: SDUPTHER

## 2019-06-12 ENCOUNTER — TELEPHONE (OUTPATIENT)
Dept: FAMILY MEDICINE CLINIC | Age: 84
End: 2019-06-12

## 2019-06-12 RX ORDER — OLANZAPINE 5 MG/1
5 TABLET ORAL NIGHTLY
Qty: 30 TABLET | Refills: 2 | Status: SHIPPED | OUTPATIENT
Start: 2019-06-12 | End: 2019-09-11 | Stop reason: SDUPTHER

## 2019-06-12 NOTE — TELEPHONE ENCOUNTER
[facility] Leeann 65      ZYPREXA ---  PT STATES SHE IS SUPPOSE TO TAKE 2 -- PLEASE CALL AND CLARIFY

## 2019-07-11 ENCOUNTER — OFFICE VISIT (OUTPATIENT)
Dept: FAMILY MEDICINE CLINIC | Age: 84
End: 2019-07-11
Payer: MEDICARE

## 2019-07-11 VITALS
HEART RATE: 74 BPM | WEIGHT: 135 LBS | RESPIRATION RATE: 14 BRPM | BODY MASS INDEX: 24.84 KG/M2 | HEIGHT: 62 IN | DIASTOLIC BLOOD PRESSURE: 70 MMHG | SYSTOLIC BLOOD PRESSURE: 126 MMHG

## 2019-07-11 DIAGNOSIS — R60.0 PEDAL EDEMA: ICD-10-CM

## 2019-07-11 DIAGNOSIS — M19.90 ARTHRITIS: Primary | ICD-10-CM

## 2019-07-11 DIAGNOSIS — G47.00 INSOMNIA, UNSPECIFIED TYPE: ICD-10-CM

## 2019-07-11 DIAGNOSIS — I10 ESSENTIAL HYPERTENSION: ICD-10-CM

## 2019-07-11 DIAGNOSIS — J31.0 CHRONIC RHINITIS: ICD-10-CM

## 2019-07-11 PROCEDURE — 99214 OFFICE O/P EST MOD 30 MIN: CPT | Performed by: FAMILY MEDICINE

## 2019-07-11 PROCEDURE — 4040F PNEUMOC VAC/ADMIN/RCVD: CPT | Performed by: FAMILY MEDICINE

## 2019-07-11 PROCEDURE — G8427 DOCREV CUR MEDS BY ELIG CLIN: HCPCS | Performed by: FAMILY MEDICINE

## 2019-07-11 PROCEDURE — 1123F ACP DISCUSS/DSCN MKR DOCD: CPT | Performed by: FAMILY MEDICINE

## 2019-07-11 PROCEDURE — 1090F PRES/ABSN URINE INCON ASSESS: CPT | Performed by: FAMILY MEDICINE

## 2019-07-11 PROCEDURE — 1036F TOBACCO NON-USER: CPT | Performed by: FAMILY MEDICINE

## 2019-07-11 PROCEDURE — G8420 CALC BMI NORM PARAMETERS: HCPCS | Performed by: FAMILY MEDICINE

## 2019-07-11 RX ORDER — LOSARTAN POTASSIUM 100 MG/1
100 TABLET ORAL NIGHTLY
Qty: 30 TABLET | Refills: 5 | Status: ON HOLD | OUTPATIENT
Start: 2019-07-11 | End: 2019-11-11 | Stop reason: SDUPTHER

## 2019-07-11 RX ORDER — MIRTAZAPINE 15 MG/1
15 TABLET, FILM COATED ORAL NIGHTLY PRN
Qty: 30 TABLET | Refills: 2 | Status: SHIPPED | OUTPATIENT
Start: 2019-07-11 | End: 2019-09-18

## 2019-07-11 NOTE — PROGRESS NOTES
Subjective:      Patient ID: Sue Tapia is a 80 y.o. female. HPI  Chief Complaint   Patient presents with    Hallucinations     SHE HAS BEEN HALLUCINATIONS, SHE SEES PEOPLE HAS CONVERSATIONS WITH THEM AND HAS BECOME FRIENDS WITH THEM THEY ONLY COME AT NIGHT SOMETIMES ITS ANIMALS AND FLOWERS     BP Readings from Last 3 Encounters:   07/11/19 126/70   02/14/19 120/76   11/19/18 (!) 158/71     Pulse Readings from Last 3 Encounters:   07/11/19 74   02/14/19 80   11/19/18 84     Wt Readings from Last 3 Encounters:   07/11/19 135 lb (61.2 kg)   02/14/19 131 lb (59.4 kg)   11/19/18 130 lb 9.6 oz (59.2 kg)     Current Outpatient Medications   Medication Sig Dispense Refill    ipratropium (ATROVENT) 0.06 % nasal spray SPRAY ONE TO TWO SPRAYS IN EACH NOSTRIL THREE TIMES A DAY AS NEEDED FOR RHINITIS 1 Bottle 5    OLANZapine (ZYPREXA) 5 MG tablet Take 1 tablet by mouth nightly 30 tablet 2    traMADol (ULTRAM) 50 MG tablet TAKE ONE TABLET BY MOUTH EVERY 6 HOURS AS NEEDED FOR PAIN 120 tablet 0    lisinopril (PRINIVIL;ZESTRIL) 20 MG tablet TAKE ONE TABLET BY MOUTH DAILY 30 tablet 5    rivaroxaban (XARELTO) 15 MG TABS tablet TAKE ONE TABLET BY MOUTH DAILY WITH BREAKFAST 30 tablet 4    spironolactone (ALDACTONE) 25 MG tablet Take 1 tablet by mouth daily 90 tablet 3    levothyroxine (SYNTHROID) 75 MCG tablet TAKE 1 TABLET BY MOUTH  DAILY 90 tablet 2    furosemide (LASIX) 20 MG tablet Take 1 tablet by mouth every other day 30 tablet 3    Probiotic Product (PROBIOTIC ADVANCED PO) Take by mouth daily      omeprazole 20 MG EC tablet Take 20 mg by mouth daily      sotalol AF 80 MG TABS Take 80 mg by mouth 2 times daily.  Multiple Vitamins-Minerals (THERAPEUTIC MULTIVITAMIN-MINERALS) tablet Take 1 tablet by mouth daily.  Calcium Carbonate Antacid (MAALOX) 600 MG CHEW Take 1 tablet by mouth daily as needed.  diphenhydrAMINE (BENADRYL) 25 MG tablet Take 25 mg by mouth nightly as needed.          No current facility-administered medications for this visit. FELT BETTER W/ LOSARTAN THAN LISINOPRIL - FEELS LIKE LATTER MED CAUSE OF MUCH OF HALLUCINATIONS - AT NIGHT - SEES PEOPLE WHO AREN'T THERE. HAD IN PAST BUT GOT BETTER ON ZYPREXA - flared again since recall of losartan and change to acei  bp running  Generally in normal range - sukhwinder later in day - early am bp running upper 120's to low 150's  occ headache but mild  Using atrovent tid - as mucus is piling up again lately - seems to help some  Uses ultram 1-2x/ day - uses walker - hard to get around  A lot of diffuse joint pain/ stiffness for hours in am - moves around better w/ ultram  Started w/ knee pain age 28 - mom very arthritic - got cortisone/ gold  Seen by rheum in past - never dx w/ RA  - dx w/ OA  Energy seems low   Sleep okay usually  occ hard to get to sleep - benadryl 25mg usually helps but not always -would like something else to sleep as an option  Review of Systems    Objective:   Physical Exam   Constitutional: She is oriented to person, place, and time. She appears well-developed and well-nourished. No distress. HENT:   Head: Normocephalic and atraumatic. Eyes: Conjunctivae are normal. No scleral icterus. Cardiovascular: Normal rate, regular rhythm, normal heart sounds and intact distal pulses. No murmur heard. Pulmonary/Chest: Effort normal and breath sounds normal. No respiratory distress. She has no wheezes. She has no rales. Abdominal: Soft. Bowel sounds are normal. She exhibits no distension. There is no tenderness. Musculoskeletal: She exhibits edema (trace ankles). No synovitis/ inflammation  Kyphotic w/ slow gait w/ walker   Neurological: She is alert and oriented to person, place, and time. Skin: Skin is warm and dry. Psychiatric: She has a normal mood and affect. Assessment:       Diagnosis Orders   1. Arthritis     2. Pedal edema     3. Essential hypertension     4. Chronic rhinitis     5.  Insomnia, unspecified type             Plan:      Change lisinopril 20 to losartan 100mg nightly w/ monitoring of bp  Consider wean down zyprexa 5 to 2.5 hs for several weeks then stop as long as no hallucinations  Cont aldactone in am w/ sotalol bid     Cont ultram for arthritis pain  Allegra/ atrovent ns prn   Cont synthroid for thyroid  F/u cardio regarding afib   F/u 4 months w/ annual lab work  Fredi Carter MD

## 2019-07-29 RX ORDER — SOTALOL HYDROCHLORIDE 80 MG/1
80 TABLET ORAL 2 TIMES DAILY
Qty: 30 TABLET | Refills: 0 | Status: SHIPPED | OUTPATIENT
Start: 2019-07-29 | End: 2019-08-05 | Stop reason: SDUPTHER

## 2019-07-29 RX ORDER — LEVOTHYROXINE SODIUM 0.07 MG/1
TABLET ORAL
Qty: 15 TABLET | Refills: 2 | Status: SHIPPED | OUTPATIENT
Start: 2019-07-29 | End: 2019-09-29 | Stop reason: SDUPTHER

## 2019-08-05 RX ORDER — SOTALOL HYDROCHLORIDE 80 MG/1
80 TABLET ORAL 2 TIMES DAILY
Qty: 180 TABLET | Refills: 0 | Status: ON HOLD | OUTPATIENT
Start: 2019-08-05 | End: 2020-01-14 | Stop reason: HOSPADM

## 2019-08-07 ENCOUNTER — CLINICAL DOCUMENTATION (OUTPATIENT)
Dept: FAMILY MEDICINE CLINIC | Age: 84
End: 2019-08-07

## 2019-08-08 ENCOUNTER — TELEPHONE (OUTPATIENT)
Dept: FAMILY MEDICINE CLINIC | Age: 84
End: 2019-08-08

## 2019-08-08 NOTE — TELEPHONE ENCOUNTER
I thought Joseph Gonsalez had taken care of this.   Okay to use together as low dose , weaning down/ off zyprexa (4) walks frequently

## 2019-09-18 RX ORDER — TRAZODONE HYDROCHLORIDE 100 MG/1
100 TABLET ORAL NIGHTLY PRN
Qty: 30 TABLET | Refills: 2 | Status: SHIPPED | OUTPATIENT
Start: 2019-09-18 | End: 2019-11-07

## 2019-09-30 RX ORDER — LEVOTHYROXINE SODIUM 0.07 MG/1
TABLET ORAL
Qty: 90 TABLET | Refills: 3 | Status: SHIPPED | OUTPATIENT
Start: 2019-09-30 | End: 2020-09-21 | Stop reason: SDUPTHER

## 2019-10-02 ENCOUNTER — TELEPHONE (OUTPATIENT)
Dept: FAMILY MEDICINE CLINIC | Age: 84
End: 2019-10-02

## 2019-10-02 DIAGNOSIS — M19.90 ARTHRITIS: ICD-10-CM

## 2019-10-02 RX ORDER — TRAMADOL HYDROCHLORIDE 50 MG/1
50 TABLET ORAL EVERY 6 HOURS PRN
Qty: 120 TABLET | Refills: 0 | Status: SHIPPED | OUTPATIENT
Start: 2019-10-02 | End: 2019-11-07 | Stop reason: SDUPTHER

## 2019-11-07 ENCOUNTER — OFFICE VISIT (OUTPATIENT)
Dept: FAMILY MEDICINE CLINIC | Age: 84
End: 2019-11-07
Payer: MEDICARE

## 2019-11-07 VITALS
HEIGHT: 62 IN | DIASTOLIC BLOOD PRESSURE: 70 MMHG | WEIGHT: 136 LBS | SYSTOLIC BLOOD PRESSURE: 122 MMHG | HEART RATE: 76 BPM | RESPIRATION RATE: 14 BRPM | BODY MASS INDEX: 25.03 KG/M2 | OXYGEN SATURATION: 99 %

## 2019-11-07 DIAGNOSIS — Z00.00 ROUTINE GENERAL MEDICAL EXAMINATION AT A HEALTH CARE FACILITY: Primary | ICD-10-CM

## 2019-11-07 DIAGNOSIS — I48.91 ATRIAL FIBRILLATION, UNSPECIFIED TYPE (HCC): ICD-10-CM

## 2019-11-07 DIAGNOSIS — I10 ESSENTIAL HYPERTENSION: ICD-10-CM

## 2019-11-07 DIAGNOSIS — N18.30 CKD (CHRONIC KIDNEY DISEASE) STAGE 3, GFR 30-59 ML/MIN (HCC): ICD-10-CM

## 2019-11-07 DIAGNOSIS — Z23 NEED FOR IMMUNIZATION AGAINST INFLUENZA: ICD-10-CM

## 2019-11-07 DIAGNOSIS — M19.90 ARTHRITIS: ICD-10-CM

## 2019-11-07 DIAGNOSIS — E53.8 B12 DEFICIENCY: ICD-10-CM

## 2019-11-07 DIAGNOSIS — E03.9 HYPOTHYROIDISM, UNSPECIFIED TYPE: ICD-10-CM

## 2019-11-07 LAB
A/G RATIO: 1.6 (ref 1.1–2.2)
ALBUMIN SERPL-MCNC: 4.4 G/DL (ref 3.4–5)
ALP BLD-CCNC: 78 U/L (ref 40–129)
ALT SERPL-CCNC: 11 U/L (ref 10–40)
ANION GAP SERPL CALCULATED.3IONS-SCNC: 13 MMOL/L (ref 3–16)
AST SERPL-CCNC: 23 U/L (ref 15–37)
BASOPHILS ABSOLUTE: 0 K/UL (ref 0–0.2)
BASOPHILS RELATIVE PERCENT: 0.5 %
BILIRUB SERPL-MCNC: 0.5 MG/DL (ref 0–1)
BUN BLDV-MCNC: 17 MG/DL (ref 7–20)
CALCIUM SERPL-MCNC: 10.6 MG/DL (ref 8.3–10.6)
CHLORIDE BLD-SCNC: 95 MMOL/L (ref 99–110)
CO2: 28 MMOL/L (ref 21–32)
CREAT SERPL-MCNC: 1 MG/DL (ref 0.6–1.2)
EOSINOPHILS ABSOLUTE: 0.1 K/UL (ref 0–0.6)
EOSINOPHILS RELATIVE PERCENT: 0.8 %
FOLATE: >20 NG/ML (ref 4.78–24.2)
GFR AFRICAN AMERICAN: >60
GFR NON-AFRICAN AMERICAN: 52
GLOBULIN: 2.7 G/DL
GLUCOSE BLD-MCNC: 115 MG/DL (ref 70–99)
HCT VFR BLD CALC: 35 % (ref 36–48)
HEMOGLOBIN: 11.1 G/DL (ref 12–16)
LYMPHOCYTES ABSOLUTE: 1.6 K/UL (ref 1–5.1)
LYMPHOCYTES RELATIVE PERCENT: 19.3 %
MCH RBC QN AUTO: 24.3 PG (ref 26–34)
MCHC RBC AUTO-ENTMCNC: 31.7 G/DL (ref 31–36)
MCV RBC AUTO: 76.6 FL (ref 80–100)
MONOCYTES ABSOLUTE: 0.9 K/UL (ref 0–1.3)
MONOCYTES RELATIVE PERCENT: 10.8 %
NEUTROPHILS ABSOLUTE: 5.7 K/UL (ref 1.7–7.7)
NEUTROPHILS RELATIVE PERCENT: 68.6 %
PDW BLD-RTO: 15.6 % (ref 12.4–15.4)
PLATELET # BLD: 308 K/UL (ref 135–450)
PMV BLD AUTO: 7 FL (ref 5–10.5)
POTASSIUM SERPL-SCNC: 4.6 MMOL/L (ref 3.5–5.1)
RBC # BLD: 4.57 M/UL (ref 4–5.2)
SODIUM BLD-SCNC: 136 MMOL/L (ref 136–145)
T4 FREE: 1.7 NG/DL (ref 0.9–1.8)
TOTAL PROTEIN: 7.1 G/DL (ref 6.4–8.2)
TSH SERPL DL<=0.05 MIU/L-ACNC: 0.53 UIU/ML (ref 0.27–4.2)
VITAMIN B-12: 797 PG/ML (ref 211–911)
WBC # BLD: 8.4 K/UL (ref 4–11)

## 2019-11-07 PROCEDURE — 90653 IIV ADJUVANT VACCINE IM: CPT | Performed by: FAMILY MEDICINE

## 2019-11-07 PROCEDURE — 36415 COLL VENOUS BLD VENIPUNCTURE: CPT | Performed by: FAMILY MEDICINE

## 2019-11-07 PROCEDURE — G8484 FLU IMMUNIZE NO ADMIN: HCPCS | Performed by: FAMILY MEDICINE

## 2019-11-07 PROCEDURE — G0008 ADMIN INFLUENZA VIRUS VAC: HCPCS | Performed by: FAMILY MEDICINE

## 2019-11-07 PROCEDURE — 1123F ACP DISCUSS/DSCN MKR DOCD: CPT | Performed by: FAMILY MEDICINE

## 2019-11-07 PROCEDURE — 4040F PNEUMOC VAC/ADMIN/RCVD: CPT | Performed by: FAMILY MEDICINE

## 2019-11-07 PROCEDURE — G0438 PPPS, INITIAL VISIT: HCPCS | Performed by: FAMILY MEDICINE

## 2019-11-07 RX ORDER — TRAZODONE HYDROCHLORIDE 100 MG/1
100-200 TABLET ORAL NIGHTLY PRN
Qty: 60 TABLET | Refills: 5 | Status: SHIPPED | OUTPATIENT
Start: 2019-11-07 | End: 2020-06-23 | Stop reason: ALTCHOICE

## 2019-11-07 RX ORDER — TRAMADOL HYDROCHLORIDE 50 MG/1
50 TABLET ORAL EVERY 6 HOURS PRN
Qty: 120 TABLET | Refills: 2 | Status: SHIPPED | OUTPATIENT
Start: 2019-11-07 | End: 2020-04-23

## 2019-11-07 ASSESSMENT — PATIENT HEALTH QUESTIONNAIRE - PHQ9: SUM OF ALL RESPONSES TO PHQ QUESTIONS 1-9: 3

## 2019-11-07 ASSESSMENT — LIFESTYLE VARIABLES: HOW OFTEN DO YOU HAVE A DRINK CONTAINING ALCOHOL: 0

## 2019-11-08 ENCOUNTER — TELEPHONE (OUTPATIENT)
Dept: FAMILY MEDICINE CLINIC | Age: 84
End: 2019-11-08

## 2019-11-08 NOTE — TELEPHONE ENCOUNTER
Spoke w/ son-  More confused today - consider check ua at urgent care over weekend o/w Monday.   If becomes progressively more confused - ER  D/w pt's son lab results  Recheck cbc w/ iron/tibc/ ferritin in next 1-2 weeks and if iron low - consider GI eval

## 2019-11-08 NOTE — TELEPHONE ENCOUNTER
L/S 11/06/19 - MEMORY LOSS, GETTING WORSE THE LAST COUPLE OF DAYS - HE IS HOPING TO SPEAK TO DR. MUNIZ TODAY IF POSSIBLE      SON IN LAW CALLING - Yannick @  [phone]

## 2019-11-10 ENCOUNTER — APPOINTMENT (OUTPATIENT)
Dept: CT IMAGING | Age: 84
DRG: 689 | End: 2019-11-10
Payer: MEDICARE

## 2019-11-10 ENCOUNTER — HOSPITAL ENCOUNTER (INPATIENT)
Age: 84
LOS: 1 days | Discharge: HOME | DRG: 689 | End: 2019-11-12
Attending: EMERGENCY MEDICINE | Admitting: INTERNAL MEDICINE
Payer: MEDICARE

## 2019-11-10 ENCOUNTER — APPOINTMENT (OUTPATIENT)
Dept: GENERAL RADIOLOGY | Age: 84
DRG: 689 | End: 2019-11-10
Payer: MEDICARE

## 2019-11-10 DIAGNOSIS — N30.00 ACUTE CYSTITIS WITHOUT HEMATURIA: Primary | ICD-10-CM

## 2019-11-10 PROBLEM — G93.41 ACUTE METABOLIC ENCEPHALOPATHY: Status: ACTIVE | Noted: 2019-11-10

## 2019-11-10 PROBLEM — R53.1 GENERALIZED WEAKNESS: Status: ACTIVE | Noted: 2019-11-10

## 2019-11-10 PROBLEM — N39.0 UTI (URINARY TRACT INFECTION): Status: ACTIVE | Noted: 2019-11-10

## 2019-11-10 LAB
ANION GAP SERPL CALCULATED.3IONS-SCNC: 11 MMOL/L (ref 3–16)
BACTERIA: ABNORMAL /HPF
BASOPHILS ABSOLUTE: 0 K/UL (ref 0–0.2)
BASOPHILS RELATIVE PERCENT: 0.6 %
BILIRUBIN URINE: NEGATIVE
BLOOD, URINE: ABNORMAL
BUN BLDV-MCNC: 12 MG/DL (ref 7–20)
CALCIUM SERPL-MCNC: 10.2 MG/DL (ref 8.3–10.6)
CHLORIDE BLD-SCNC: 95 MMOL/L (ref 99–110)
CLARITY: ABNORMAL
CO2: 25 MMOL/L (ref 21–32)
COLOR: YELLOW
CREAT SERPL-MCNC: 0.9 MG/DL (ref 0.6–1.2)
EOSINOPHILS ABSOLUTE: 0 K/UL (ref 0–0.6)
EOSINOPHILS RELATIVE PERCENT: 0.5 %
EPITHELIAL CELLS, UA: 0 /HPF (ref 0–5)
GFR AFRICAN AMERICAN: >60
GFR NON-AFRICAN AMERICAN: 58
GLUCOSE BLD-MCNC: 129 MG/DL (ref 70–99)
GLUCOSE URINE: NEGATIVE MG/DL
HCT VFR BLD CALC: 33.1 % (ref 36–48)
HEMOGLOBIN: 10.5 G/DL (ref 12–16)
HYALINE CASTS: 1 /LPF (ref 0–8)
KETONES, URINE: NEGATIVE MG/DL
LEUKOCYTE ESTERASE, URINE: ABNORMAL
LYMPHOCYTES ABSOLUTE: 1.2 K/UL (ref 1–5.1)
LYMPHOCYTES RELATIVE PERCENT: 14.6 %
MCH RBC QN AUTO: 24 PG (ref 26–34)
MCHC RBC AUTO-ENTMCNC: 31.8 G/DL (ref 31–36)
MCV RBC AUTO: 75.5 FL (ref 80–100)
MICROSCOPIC EXAMINATION: YES
MONOCYTES ABSOLUTE: 1 K/UL (ref 0–1.3)
MONOCYTES RELATIVE PERCENT: 11.4 %
NEUTROPHILS ABSOLUTE: 6.1 K/UL (ref 1.7–7.7)
NEUTROPHILS RELATIVE PERCENT: 72.9 %
NITRITE, URINE: POSITIVE
PDW BLD-RTO: 15.9 % (ref 12.4–15.4)
PH UA: 7.5 (ref 5–8)
PLATELET # BLD: 331 K/UL (ref 135–450)
PMV BLD AUTO: 6.9 FL (ref 5–10.5)
POTASSIUM REFLEX MAGNESIUM: 4.2 MMOL/L (ref 3.5–5.1)
PRO-BNP: 826 PG/ML (ref 0–449)
PROTEIN UA: NEGATIVE MG/DL
RBC # BLD: 4.38 M/UL (ref 4–5.2)
RBC UA: 2 /HPF (ref 0–4)
SODIUM BLD-SCNC: 131 MMOL/L (ref 136–145)
SPECIFIC GRAVITY UA: 1.01 (ref 1–1.03)
TROPONIN: <0.01 NG/ML
URINE TYPE: ABNORMAL
UROBILINOGEN, URINE: 0.2 E.U./DL
WBC # BLD: 8.4 K/UL (ref 4–11)
WBC UA: 54 /HPF (ref 0–5)

## 2019-11-10 PROCEDURE — 70450 CT HEAD/BRAIN W/O DYE: CPT

## 2019-11-10 PROCEDURE — 85025 COMPLETE CBC W/AUTO DIFF WBC: CPT

## 2019-11-10 PROCEDURE — 87086 URINE CULTURE/COLONY COUNT: CPT

## 2019-11-10 PROCEDURE — 2580000003 HC RX 258: Performed by: EMERGENCY MEDICINE

## 2019-11-10 PROCEDURE — G0378 HOSPITAL OBSERVATION PER HR: HCPCS

## 2019-11-10 PROCEDURE — 93005 ELECTROCARDIOGRAM TRACING: CPT | Performed by: EMERGENCY MEDICINE

## 2019-11-10 PROCEDURE — 6370000000 HC RX 637 (ALT 250 FOR IP): Performed by: INTERNAL MEDICINE

## 2019-11-10 PROCEDURE — 81001 URINALYSIS AUTO W/SCOPE: CPT

## 2019-11-10 PROCEDURE — 84484 ASSAY OF TROPONIN QUANT: CPT

## 2019-11-10 PROCEDURE — 87186 SC STD MICRODIL/AGAR DIL: CPT

## 2019-11-10 PROCEDURE — 99285 EMERGENCY DEPT VISIT HI MDM: CPT

## 2019-11-10 PROCEDURE — 2580000003 HC RX 258: Performed by: INTERNAL MEDICINE

## 2019-11-10 PROCEDURE — 83880 ASSAY OF NATRIURETIC PEPTIDE: CPT

## 2019-11-10 PROCEDURE — 80048 BASIC METABOLIC PNL TOTAL CA: CPT

## 2019-11-10 PROCEDURE — 36415 COLL VENOUS BLD VENIPUNCTURE: CPT

## 2019-11-10 PROCEDURE — 71046 X-RAY EXAM CHEST 2 VIEWS: CPT

## 2019-11-10 PROCEDURE — 96365 THER/PROPH/DIAG IV INF INIT: CPT

## 2019-11-10 PROCEDURE — 6360000002 HC RX W HCPCS: Performed by: EMERGENCY MEDICINE

## 2019-11-10 PROCEDURE — 87077 CULTURE AEROBIC IDENTIFY: CPT

## 2019-11-10 RX ORDER — SODIUM CHLORIDE 0.9 % (FLUSH) 0.9 %
10 SYRINGE (ML) INJECTION EVERY 12 HOURS SCHEDULED
Status: DISCONTINUED | OUTPATIENT
Start: 2019-11-10 | End: 2019-11-12 | Stop reason: HOSPADM

## 2019-11-10 RX ORDER — SOTALOL HYDROCHLORIDE 80 MG/1
80 TABLET ORAL 2 TIMES DAILY
Status: DISCONTINUED | OUTPATIENT
Start: 2019-11-10 | End: 2019-11-12 | Stop reason: HOSPADM

## 2019-11-10 RX ORDER — M-VIT,TX,IRON,MINS/CALC/FOLIC 27MG-0.4MG
1 TABLET ORAL DAILY
Status: DISCONTINUED | OUTPATIENT
Start: 2019-11-11 | End: 2019-11-12 | Stop reason: HOSPADM

## 2019-11-10 RX ORDER — LOSARTAN POTASSIUM 50 MG/1
100 TABLET ORAL NIGHTLY
Status: DISCONTINUED | OUTPATIENT
Start: 2019-11-10 | End: 2019-11-12 | Stop reason: HOSPADM

## 2019-11-10 RX ORDER — LEVOTHYROXINE SODIUM 0.1 MG/1
100 TABLET ORAL DAILY
Status: DISCONTINUED | OUTPATIENT
Start: 2019-11-11 | End: 2019-11-12 | Stop reason: HOSPADM

## 2019-11-10 RX ORDER — SODIUM CHLORIDE 0.9 % (FLUSH) 0.9 %
10 SYRINGE (ML) INJECTION PRN
Status: DISCONTINUED | OUTPATIENT
Start: 2019-11-10 | End: 2019-11-12 | Stop reason: HOSPADM

## 2019-11-10 RX ORDER — IPRATROPIUM BROMIDE 42 UG/1
1 SPRAY, METERED NASAL 2 TIMES DAILY PRN
Status: DISCONTINUED | OUTPATIENT
Start: 2019-11-10 | End: 2019-11-12 | Stop reason: HOSPADM

## 2019-11-10 RX ORDER — CA/D3/MAG OX/ZINC/COP/MANG/BOR 600 MG-800
1 TABLET,CHEWABLE ORAL DAILY
Status: DISCONTINUED | OUTPATIENT
Start: 2019-11-10 | End: 2019-11-10

## 2019-11-10 RX ORDER — PANTOPRAZOLE SODIUM 40 MG/1
40 TABLET, DELAYED RELEASE ORAL
Status: DISCONTINUED | OUTPATIENT
Start: 2019-11-11 | End: 2019-11-12 | Stop reason: HOSPADM

## 2019-11-10 RX ORDER — TRAMADOL HYDROCHLORIDE 50 MG/1
50 TABLET ORAL EVERY 6 HOURS PRN
Status: DISCONTINUED | OUTPATIENT
Start: 2019-11-10 | End: 2019-11-12 | Stop reason: HOSPADM

## 2019-11-10 RX ORDER — SPIRONOLACTONE 25 MG/1
25 TABLET ORAL DAILY
Status: DISCONTINUED | OUTPATIENT
Start: 2019-11-11 | End: 2019-11-12 | Stop reason: HOSPADM

## 2019-11-10 RX ADMIN — TRAMADOL HYDROCHLORIDE 50 MG: 50 TABLET, FILM COATED ORAL at 20:27

## 2019-11-10 RX ADMIN — LOSARTAN POTASSIUM 100 MG: 50 TABLET, FILM COATED ORAL at 20:24

## 2019-11-10 RX ADMIN — SODIUM CHLORIDE, PRESERVATIVE FREE 10 ML: 5 INJECTION INTRAVENOUS at 20:24

## 2019-11-10 RX ADMIN — SOTALOL HYDROCHLORIDE 80 MG: 80 TABLET ORAL at 20:24

## 2019-11-10 RX ADMIN — CEFTRIAXONE 1 G: 1 INJECTION, POWDER, FOR SOLUTION INTRAMUSCULAR; INTRAVENOUS at 15:14

## 2019-11-10 ASSESSMENT — PAIN DESCRIPTION - ORIENTATION: ORIENTATION: MID;LOWER

## 2019-11-10 ASSESSMENT — ENCOUNTER SYMPTOMS
ABDOMINAL PAIN: 0
SHORTNESS OF BREATH: 0

## 2019-11-10 ASSESSMENT — PAIN DESCRIPTION - LOCATION: LOCATION: BACK

## 2019-11-10 ASSESSMENT — PAIN SCALES - GENERAL
PAINLEVEL_OUTOF10: 7
PAINLEVEL_OUTOF10: 0
PAINLEVEL_OUTOF10: 8
PAINLEVEL_OUTOF10: 6

## 2019-11-10 ASSESSMENT — PAIN DESCRIPTION - DESCRIPTORS: DESCRIPTORS: ACHING

## 2019-11-10 ASSESSMENT — PAIN DESCRIPTION - PAIN TYPE: TYPE: CHRONIC PAIN

## 2019-11-11 LAB
A/G RATIO: 1.4 (ref 1.1–2.2)
ALBUMIN SERPL-MCNC: 3.9 G/DL (ref 3.4–5)
ALP BLD-CCNC: 70 U/L (ref 40–129)
ALT SERPL-CCNC: 9 U/L (ref 10–40)
ANION GAP SERPL CALCULATED.3IONS-SCNC: 12 MMOL/L (ref 3–16)
AST SERPL-CCNC: 22 U/L (ref 15–37)
BASOPHILS ABSOLUTE: 0 K/UL (ref 0–0.2)
BASOPHILS RELATIVE PERCENT: 0.3 %
BILIRUB SERPL-MCNC: 0.5 MG/DL (ref 0–1)
BUN BLDV-MCNC: 15 MG/DL (ref 7–20)
CALCIUM SERPL-MCNC: 9.5 MG/DL (ref 8.3–10.6)
CHLORIDE BLD-SCNC: 98 MMOL/L (ref 99–110)
CO2: 24 MMOL/L (ref 21–32)
CREAT SERPL-MCNC: 0.8 MG/DL (ref 0.6–1.2)
EKG ATRIAL RATE: 69 BPM
EKG DIAGNOSIS: NORMAL
EKG P-R INTERVAL: 214 MS
EKG Q-T INTERVAL: 450 MS
EKG QRS DURATION: 160 MS
EKG QTC CALCULATION (BAZETT): 482 MS
EKG R AXIS: -84 DEGREES
EKG T AXIS: 81 DEGREES
EKG VENTRICULAR RATE: 69 BPM
EOSINOPHILS ABSOLUTE: 0 K/UL (ref 0–0.6)
EOSINOPHILS RELATIVE PERCENT: 0.5 %
GFR AFRICAN AMERICAN: >60
GFR NON-AFRICAN AMERICAN: >60
GLOBULIN: 2.7 G/DL
GLUCOSE BLD-MCNC: 117 MG/DL (ref 70–99)
HCT VFR BLD CALC: 33.1 % (ref 36–48)
HEMOGLOBIN: 10.6 G/DL (ref 12–16)
LYMPHOCYTES ABSOLUTE: 1.3 K/UL (ref 1–5.1)
LYMPHOCYTES RELATIVE PERCENT: 14.5 %
MCH RBC QN AUTO: 23.9 PG (ref 26–34)
MCHC RBC AUTO-ENTMCNC: 32 G/DL (ref 31–36)
MCV RBC AUTO: 74.6 FL (ref 80–100)
MONOCYTES ABSOLUTE: 1 K/UL (ref 0–1.3)
MONOCYTES RELATIVE PERCENT: 10.7 %
NEUTROPHILS ABSOLUTE: 6.7 K/UL (ref 1.7–7.7)
NEUTROPHILS RELATIVE PERCENT: 74 %
PDW BLD-RTO: 15.7 % (ref 12.4–15.4)
PLATELET # BLD: 296 K/UL (ref 135–450)
PMV BLD AUTO: 6.5 FL (ref 5–10.5)
POTASSIUM REFLEX MAGNESIUM: 4.1 MMOL/L (ref 3.5–5.1)
RBC # BLD: 4.44 M/UL (ref 4–5.2)
SODIUM BLD-SCNC: 134 MMOL/L (ref 136–145)
TOTAL PROTEIN: 6.6 G/DL (ref 6.4–8.2)
WBC # BLD: 9.1 K/UL (ref 4–11)

## 2019-11-11 PROCEDURE — 6370000000 HC RX 637 (ALT 250 FOR IP): Performed by: INTERNAL MEDICINE

## 2019-11-11 PROCEDURE — G0378 HOSPITAL OBSERVATION PER HR: HCPCS

## 2019-11-11 PROCEDURE — 85025 COMPLETE CBC W/AUTO DIFF WBC: CPT

## 2019-11-11 PROCEDURE — 96375 TX/PRO/DX INJ NEW DRUG ADDON: CPT

## 2019-11-11 PROCEDURE — 2580000003 HC RX 258: Performed by: INTERNAL MEDICINE

## 2019-11-11 PROCEDURE — 97165 OT EVAL LOW COMPLEX 30 MIN: CPT

## 2019-11-11 PROCEDURE — 96366 THER/PROPH/DIAG IV INF ADDON: CPT

## 2019-11-11 PROCEDURE — 97162 PT EVAL MOD COMPLEX 30 MIN: CPT

## 2019-11-11 PROCEDURE — 96376 TX/PRO/DX INJ SAME DRUG ADON: CPT

## 2019-11-11 PROCEDURE — 6360000002 HC RX W HCPCS: Performed by: INTERNAL MEDICINE

## 2019-11-11 PROCEDURE — 97530 THERAPEUTIC ACTIVITIES: CPT

## 2019-11-11 PROCEDURE — 80053 COMPREHEN METABOLIC PANEL: CPT

## 2019-11-11 PROCEDURE — 97116 GAIT TRAINING THERAPY: CPT

## 2019-11-11 PROCEDURE — 93010 ELECTROCARDIOGRAM REPORT: CPT | Performed by: INTERNAL MEDICINE

## 2019-11-11 PROCEDURE — 36415 COLL VENOUS BLD VENIPUNCTURE: CPT

## 2019-11-11 RX ORDER — ONDANSETRON 2 MG/ML
4 INJECTION INTRAMUSCULAR; INTRAVENOUS EVERY 6 HOURS PRN
Status: DISCONTINUED | OUTPATIENT
Start: 2019-11-11 | End: 2019-11-12 | Stop reason: HOSPADM

## 2019-11-11 RX ORDER — ACETAMINOPHEN 325 MG/1
650 TABLET ORAL EVERY 4 HOURS PRN
Status: DISCONTINUED | OUTPATIENT
Start: 2019-11-11 | End: 2019-11-12 | Stop reason: HOSPADM

## 2019-11-11 RX ORDER — CALCIUM CARBONATE 200(500)MG
500 TABLET,CHEWABLE ORAL 3 TIMES DAILY PRN
Status: DISCONTINUED | OUTPATIENT
Start: 2019-11-11 | End: 2019-11-12 | Stop reason: HOSPADM

## 2019-11-11 RX ORDER — LOSARTAN POTASSIUM 100 MG/1
100 TABLET ORAL NIGHTLY
Qty: 30 TABLET | Refills: 5 | Status: SHIPPED | OUTPATIENT
Start: 2019-11-11 | End: 2020-07-27

## 2019-11-11 RX ADMIN — CEFTRIAXONE 1 G: 1 INJECTION, POWDER, FOR SOLUTION INTRAMUSCULAR; INTRAVENOUS at 13:42

## 2019-11-11 RX ADMIN — RIVAROXABAN 15 MG: 15 TABLET, FILM COATED ORAL at 10:34

## 2019-11-11 RX ADMIN — LOSARTAN POTASSIUM 100 MG: 50 TABLET, FILM COATED ORAL at 20:42

## 2019-11-11 RX ADMIN — LEVOTHYROXINE SODIUM 100 MCG: 100 TABLET ORAL at 08:47

## 2019-11-11 RX ADMIN — ONDANSETRON 4 MG: 2 INJECTION INTRAMUSCULAR; INTRAVENOUS at 17:41

## 2019-11-11 RX ADMIN — SODIUM CHLORIDE, PRESERVATIVE FREE 10 ML: 5 INJECTION INTRAVENOUS at 08:49

## 2019-11-11 RX ADMIN — TRAMADOL HYDROCHLORIDE 50 MG: 50 TABLET, FILM COATED ORAL at 02:58

## 2019-11-11 RX ADMIN — SOTALOL HYDROCHLORIDE 80 MG: 80 TABLET ORAL at 20:42

## 2019-11-11 RX ADMIN — ONDANSETRON 4 MG: 2 INJECTION INTRAMUSCULAR; INTRAVENOUS at 08:45

## 2019-11-11 RX ADMIN — PANTOPRAZOLE SODIUM 40 MG: 40 TABLET, DELAYED RELEASE ORAL at 08:47

## 2019-11-11 RX ADMIN — SOTALOL HYDROCHLORIDE 80 MG: 80 TABLET ORAL at 08:47

## 2019-11-11 RX ADMIN — MULTIPLE VITAMINS W/ MINERALS TAB 1 TABLET: TAB at 08:47

## 2019-11-11 RX ADMIN — SODIUM CHLORIDE, PRESERVATIVE FREE 10 ML: 5 INJECTION INTRAVENOUS at 20:47

## 2019-11-11 RX ADMIN — SPIRONOLACTONE 25 MG: 25 TABLET ORAL at 08:47

## 2019-11-11 ASSESSMENT — PAIN SCALES - GENERAL
PAINLEVEL_OUTOF10: 0
PAINLEVEL_OUTOF10: 0
PAINLEVEL_OUTOF10: 7

## 2019-11-12 VITALS
HEART RATE: 67 BPM | HEIGHT: 62 IN | TEMPERATURE: 97.8 F | OXYGEN SATURATION: 95 % | RESPIRATION RATE: 17 BRPM | DIASTOLIC BLOOD PRESSURE: 75 MMHG | SYSTOLIC BLOOD PRESSURE: 135 MMHG | BODY MASS INDEX: 24.34 KG/M2 | WEIGHT: 132.28 LBS

## 2019-11-12 LAB
ORGANISM: ABNORMAL
URINE CULTURE, ROUTINE: ABNORMAL

## 2019-11-12 PROCEDURE — G0378 HOSPITAL OBSERVATION PER HR: HCPCS

## 2019-11-12 PROCEDURE — 1200000000 HC SEMI PRIVATE

## 2019-11-12 PROCEDURE — 6370000000 HC RX 637 (ALT 250 FOR IP): Performed by: INTERNAL MEDICINE

## 2019-11-12 PROCEDURE — 94760 N-INVAS EAR/PLS OXIMETRY 1: CPT

## 2019-11-12 PROCEDURE — 6360000002 HC RX W HCPCS: Performed by: INTERNAL MEDICINE

## 2019-11-12 PROCEDURE — 2580000003 HC RX 258: Performed by: INTERNAL MEDICINE

## 2019-11-12 RX ORDER — CEFUROXIME AXETIL 250 MG/1
250 TABLET ORAL 2 TIMES DAILY
Qty: 8 TABLET | Refills: 0 | Status: SHIPPED | OUTPATIENT
Start: 2019-11-12 | End: 2019-11-16

## 2019-11-12 RX ADMIN — SODIUM CHLORIDE, PRESERVATIVE FREE 10 ML: 5 INJECTION INTRAVENOUS at 09:10

## 2019-11-12 RX ADMIN — SOTALOL HYDROCHLORIDE 80 MG: 80 TABLET ORAL at 09:09

## 2019-11-12 RX ADMIN — LEVOTHYROXINE SODIUM 100 MCG: 100 TABLET ORAL at 05:31

## 2019-11-12 RX ADMIN — PANTOPRAZOLE SODIUM 40 MG: 40 TABLET, DELAYED RELEASE ORAL at 05:31

## 2019-11-12 RX ADMIN — CEFTRIAXONE 1 G: 1 INJECTION, POWDER, FOR SOLUTION INTRAMUSCULAR; INTRAVENOUS at 14:36

## 2019-11-12 RX ADMIN — SPIRONOLACTONE 25 MG: 25 TABLET ORAL at 09:09

## 2019-11-12 RX ADMIN — RIVAROXABAN 15 MG: 15 TABLET, FILM COATED ORAL at 09:09

## 2019-11-12 RX ADMIN — MULTIPLE VITAMINS W/ MINERALS TAB 1 TABLET: TAB at 09:09

## 2019-11-12 ASSESSMENT — PAIN SCALES - GENERAL: PAINLEVEL_OUTOF10: 0

## 2019-11-13 ENCOUNTER — CARE COORDINATION (OUTPATIENT)
Dept: CASE MANAGEMENT | Age: 84
End: 2019-11-13

## 2019-11-13 DIAGNOSIS — Z79.01 LONG TERM CURRENT USE OF ANTICOAGULANT THERAPY: Primary | ICD-10-CM

## 2019-11-13 PROCEDURE — 1111F DSCHRG MED/CURRENT MED MERGE: CPT | Performed by: FAMILY MEDICINE

## 2019-11-14 ENCOUNTER — OFFICE VISIT (OUTPATIENT)
Dept: FAMILY MEDICINE CLINIC | Age: 84
End: 2019-11-14
Payer: MEDICARE

## 2019-11-14 VITALS
RESPIRATION RATE: 12 BRPM | DIASTOLIC BLOOD PRESSURE: 70 MMHG | SYSTOLIC BLOOD PRESSURE: 114 MMHG | WEIGHT: 132 LBS | HEIGHT: 62 IN | OXYGEN SATURATION: 99 % | BODY MASS INDEX: 24.29 KG/M2 | HEART RATE: 84 BPM

## 2019-11-14 DIAGNOSIS — R41.3 MEMORY CHANGE: ICD-10-CM

## 2019-11-14 DIAGNOSIS — D50.9 IRON DEFICIENCY ANEMIA, UNSPECIFIED IRON DEFICIENCY ANEMIA TYPE: Primary | ICD-10-CM

## 2019-11-14 DIAGNOSIS — N30.00 ACUTE CYSTITIS WITHOUT HEMATURIA: ICD-10-CM

## 2019-11-14 PROCEDURE — 4040F PNEUMOC VAC/ADMIN/RCVD: CPT | Performed by: FAMILY MEDICINE

## 2019-11-14 PROCEDURE — 1090F PRES/ABSN URINE INCON ASSESS: CPT | Performed by: FAMILY MEDICINE

## 2019-11-14 PROCEDURE — 1111F DSCHRG MED/CURRENT MED MERGE: CPT | Performed by: FAMILY MEDICINE

## 2019-11-14 PROCEDURE — G8420 CALC BMI NORM PARAMETERS: HCPCS | Performed by: FAMILY MEDICINE

## 2019-11-14 PROCEDURE — 1123F ACP DISCUSS/DSCN MKR DOCD: CPT | Performed by: FAMILY MEDICINE

## 2019-11-14 PROCEDURE — G8427 DOCREV CUR MEDS BY ELIG CLIN: HCPCS | Performed by: FAMILY MEDICINE

## 2019-11-14 PROCEDURE — 99213 OFFICE O/P EST LOW 20 MIN: CPT | Performed by: FAMILY MEDICINE

## 2019-11-14 PROCEDURE — 1036F TOBACCO NON-USER: CPT | Performed by: FAMILY MEDICINE

## 2019-11-14 PROCEDURE — G8482 FLU IMMUNIZE ORDER/ADMIN: HCPCS | Performed by: FAMILY MEDICINE

## 2019-11-18 ENCOUNTER — NURSE ONLY (OUTPATIENT)
Dept: FAMILY MEDICINE CLINIC | Age: 84
End: 2019-11-18
Payer: MEDICARE

## 2019-11-18 DIAGNOSIS — R41.0 RECURRENT ACUTE CONFUSION: ICD-10-CM

## 2019-11-18 DIAGNOSIS — N30.00 ACUTE CYSTITIS WITHOUT HEMATURIA: Primary | ICD-10-CM

## 2019-11-18 LAB
BILIRUBIN, POC: NEGATIVE
BLOOD URINE, POC: ABNORMAL
CLARITY, POC: CLEAR
COLOR, POC: YELLOW
GLUCOSE URINE, POC: NEGATIVE
KETONES, POC: NEGATIVE
LEUKOCYTE EST, POC: NEGATIVE
NITRITE, POC: NEGATIVE
PH, POC: 5.5
PROTEIN, POC: NEGATIVE
SPECIFIC GRAVITY, POC: 1.01
UROBILINOGEN, POC: 0.2

## 2019-11-18 PROCEDURE — 81002 URINALYSIS NONAUTO W/O SCOPE: CPT | Performed by: FAMILY MEDICINE

## 2019-11-18 RX ORDER — SOTALOL HYDROCHLORIDE 80 MG/1
TABLET ORAL
Qty: 180 TABLET | Refills: 1 | Status: SHIPPED | OUTPATIENT
Start: 2019-11-18 | End: 2020-01-12

## 2019-11-19 ENCOUNTER — TELEPHONE (OUTPATIENT)
Dept: FAMILY MEDICINE CLINIC | Age: 84
End: 2019-11-19

## 2019-11-20 ENCOUNTER — CARE COORDINATION (OUTPATIENT)
Dept: CASE MANAGEMENT | Age: 84
End: 2019-11-20

## 2019-11-20 LAB — URINE CULTURE, ROUTINE: NORMAL

## 2019-11-21 ENCOUNTER — TELEPHONE (OUTPATIENT)
Dept: FAMILY MEDICINE CLINIC | Age: 84
End: 2019-11-21

## 2019-11-21 DIAGNOSIS — G93.41 ACUTE METABOLIC ENCEPHALOPATHY: ICD-10-CM

## 2019-11-21 DIAGNOSIS — R53.1 GENERAL WEAKNESS: Primary | ICD-10-CM

## 2019-11-21 DIAGNOSIS — N39.0 RECURRENT UTI: ICD-10-CM

## 2019-11-22 RX ORDER — SOTALOL HYDROCHLORIDE 80 MG/1
TABLET ORAL
Qty: 30 TABLET | Refills: 5 | Status: SHIPPED | OUTPATIENT
Start: 2019-11-22 | End: 2020-04-01 | Stop reason: SDUPTHER

## 2019-11-26 ENCOUNTER — CARE COORDINATION (OUTPATIENT)
Dept: CASE MANAGEMENT | Age: 84
End: 2019-11-26

## 2019-12-10 PROBLEM — N39.0 UTI (URINARY TRACT INFECTION): Status: RESOLVED | Noted: 2019-11-10 | Resolved: 2019-12-10

## 2020-01-12 ENCOUNTER — APPOINTMENT (OUTPATIENT)
Dept: GENERAL RADIOLOGY | Age: 85
DRG: 195 | End: 2020-01-12
Payer: MEDICARE

## 2020-01-12 ENCOUNTER — HOSPITAL ENCOUNTER (INPATIENT)
Age: 85
LOS: 2 days | Discharge: HOME | DRG: 195 | End: 2020-01-14
Attending: EMERGENCY MEDICINE | Admitting: INTERNAL MEDICINE
Payer: MEDICARE

## 2020-01-12 PROBLEM — J18.9 PNEUMONIA: Status: ACTIVE | Noted: 2020-01-12

## 2020-01-12 LAB
A/G RATIO: 1 (ref 1.1–2.2)
ALBUMIN SERPL-MCNC: 3.3 G/DL (ref 3.4–5)
ALP BLD-CCNC: 71 U/L (ref 40–129)
ALT SERPL-CCNC: 14 U/L (ref 10–40)
ANION GAP SERPL CALCULATED.3IONS-SCNC: 16 MMOL/L (ref 3–16)
AST SERPL-CCNC: 22 U/L (ref 15–37)
BASOPHILS ABSOLUTE: 0 K/UL (ref 0–0.2)
BASOPHILS RELATIVE PERCENT: 0.2 %
BILIRUB SERPL-MCNC: 0.7 MG/DL (ref 0–1)
BUN BLDV-MCNC: 14 MG/DL (ref 7–20)
CALCIUM SERPL-MCNC: 9 MG/DL (ref 8.3–10.6)
CHLORIDE BLD-SCNC: 94 MMOL/L (ref 99–110)
CO2: 23 MMOL/L (ref 21–32)
CREAT SERPL-MCNC: 0.8 MG/DL (ref 0.6–1.2)
EOSINOPHILS ABSOLUTE: 0.1 K/UL (ref 0–0.6)
EOSINOPHILS RELATIVE PERCENT: 0.7 %
GFR AFRICAN AMERICAN: >60
GFR NON-AFRICAN AMERICAN: >60
GLOBULIN: 3.3 G/DL
GLUCOSE BLD-MCNC: 120 MG/DL (ref 70–99)
HCT VFR BLD CALC: 38 % (ref 36–48)
HEMOGLOBIN: 12.6 G/DL (ref 12–16)
LACTIC ACID: 1.4 MMOL/L (ref 0.4–2)
LYMPHOCYTES ABSOLUTE: 0.7 K/UL (ref 1–5.1)
LYMPHOCYTES RELATIVE PERCENT: 5.6 %
MCH RBC QN AUTO: 28.3 PG (ref 26–34)
MCHC RBC AUTO-ENTMCNC: 33.2 G/DL (ref 31–36)
MCV RBC AUTO: 85.1 FL (ref 80–100)
MONOCYTES ABSOLUTE: 1.3 K/UL (ref 0–1.3)
MONOCYTES RELATIVE PERCENT: 10.9 %
NEUTROPHILS ABSOLUTE: 9.7 K/UL (ref 1.7–7.7)
NEUTROPHILS RELATIVE PERCENT: 82.6 %
PDW BLD-RTO: 21.2 % (ref 12.4–15.4)
PLATELET # BLD: 250 K/UL (ref 135–450)
PMV BLD AUTO: 6.6 FL (ref 5–10.5)
POTASSIUM REFLEX MAGNESIUM: 3.6 MMOL/L (ref 3.5–5.1)
PRO-BNP: 1209 PG/ML (ref 0–449)
RAPID INFLUENZA  B AGN: NEGATIVE
RAPID INFLUENZA A AGN: NEGATIVE
RBC # BLD: 4.46 M/UL (ref 4–5.2)
SODIUM BLD-SCNC: 133 MMOL/L (ref 136–145)
TOTAL PROTEIN: 6.6 G/DL (ref 6.4–8.2)
TROPONIN: <0.01 NG/ML
TSH SERPL DL<=0.05 MIU/L-ACNC: 0.74 UIU/ML (ref 0.27–4.2)
WBC # BLD: 11.7 K/UL (ref 4–11)

## 2020-01-12 PROCEDURE — 83605 ASSAY OF LACTIC ACID: CPT

## 2020-01-12 PROCEDURE — 1200000000 HC SEMI PRIVATE

## 2020-01-12 PROCEDURE — 6360000002 HC RX W HCPCS: Performed by: EMERGENCY MEDICINE

## 2020-01-12 PROCEDURE — 84443 ASSAY THYROID STIM HORMONE: CPT

## 2020-01-12 PROCEDURE — 71046 X-RAY EXAM CHEST 2 VIEWS: CPT

## 2020-01-12 PROCEDURE — 80053 COMPREHEN METABOLIC PANEL: CPT

## 2020-01-12 PROCEDURE — 2500000003 HC RX 250 WO HCPCS: Performed by: INTERNAL MEDICINE

## 2020-01-12 PROCEDURE — 2580000003 HC RX 258: Performed by: EMERGENCY MEDICINE

## 2020-01-12 PROCEDURE — 96365 THER/PROPH/DIAG IV INF INIT: CPT

## 2020-01-12 PROCEDURE — 83880 ASSAY OF NATRIURETIC PEPTIDE: CPT

## 2020-01-12 PROCEDURE — 87040 BLOOD CULTURE FOR BACTERIA: CPT

## 2020-01-12 PROCEDURE — 87804 INFLUENZA ASSAY W/OPTIC: CPT

## 2020-01-12 PROCEDURE — 99285 EMERGENCY DEPT VISIT HI MDM: CPT

## 2020-01-12 PROCEDURE — 84484 ASSAY OF TROPONIN QUANT: CPT

## 2020-01-12 PROCEDURE — 6370000000 HC RX 637 (ALT 250 FOR IP): Performed by: INTERNAL MEDICINE

## 2020-01-12 PROCEDURE — 85025 COMPLETE CBC W/AUTO DIFF WBC: CPT

## 2020-01-12 PROCEDURE — 93005 ELECTROCARDIOGRAM TRACING: CPT | Performed by: EMERGENCY MEDICINE

## 2020-01-12 PROCEDURE — 2580000003 HC RX 258: Performed by: INTERNAL MEDICINE

## 2020-01-12 RX ORDER — LOSARTAN POTASSIUM 100 MG/1
100 TABLET ORAL NIGHTLY
Status: DISCONTINUED | OUTPATIENT
Start: 2020-01-12 | End: 2020-01-14 | Stop reason: HOSPADM

## 2020-01-12 RX ORDER — PROCHLORPERAZINE EDISYLATE 5 MG/ML
10 INJECTION INTRAMUSCULAR; INTRAVENOUS EVERY 6 HOURS PRN
Status: DISCONTINUED | OUTPATIENT
Start: 2020-01-12 | End: 2020-01-14 | Stop reason: HOSPADM

## 2020-01-12 RX ORDER — SODIUM CHLORIDE 9 MG/ML
INJECTION, SOLUTION INTRAVENOUS CONTINUOUS
Status: DISCONTINUED | OUTPATIENT
Start: 2020-01-12 | End: 2020-01-14 | Stop reason: HOSPADM

## 2020-01-12 RX ORDER — FUROSEMIDE 20 MG/1
20 TABLET ORAL EVERY OTHER DAY
Status: DISCONTINUED | OUTPATIENT
Start: 2020-01-13 | End: 2020-01-14 | Stop reason: HOSPADM

## 2020-01-12 RX ORDER — M-VIT,TX,IRON,MINS/CALC/FOLIC 27MG-0.4MG
1 TABLET ORAL DAILY
Status: DISCONTINUED | OUTPATIENT
Start: 2020-01-13 | End: 2020-01-14 | Stop reason: HOSPADM

## 2020-01-12 RX ORDER — PANTOPRAZOLE SODIUM 40 MG/1
40 TABLET, DELAYED RELEASE ORAL
Status: DISCONTINUED | OUTPATIENT
Start: 2020-01-13 | End: 2020-01-14 | Stop reason: HOSPADM

## 2020-01-12 RX ORDER — DIPHENHYDRAMINE HCL 25 MG
25 TABLET ORAL NIGHTLY PRN
Status: DISCONTINUED | OUTPATIENT
Start: 2020-01-12 | End: 2020-01-14 | Stop reason: HOSPADM

## 2020-01-12 RX ORDER — IPRATROPIUM BROMIDE 42 UG/1
2 SPRAY, METERED NASAL 3 TIMES DAILY PRN
Status: DISCONTINUED | OUTPATIENT
Start: 2020-01-13 | End: 2020-01-14 | Stop reason: HOSPADM

## 2020-01-12 RX ORDER — SPIRONOLACTONE 25 MG/1
25 TABLET ORAL DAILY
Status: DISCONTINUED | OUTPATIENT
Start: 2020-01-13 | End: 2020-01-14 | Stop reason: HOSPADM

## 2020-01-12 RX ORDER — ALBUTEROL SULFATE 2.5 MG/3ML
2.5 SOLUTION RESPIRATORY (INHALATION)
Status: DISCONTINUED | OUTPATIENT
Start: 2020-01-12 | End: 2020-01-14 | Stop reason: HOSPADM

## 2020-01-12 RX ORDER — ACETAMINOPHEN 325 MG/1
650 TABLET ORAL EVERY 4 HOURS PRN
Status: DISCONTINUED | OUTPATIENT
Start: 2020-01-12 | End: 2020-01-14 | Stop reason: HOSPADM

## 2020-01-12 RX ORDER — SODIUM CHLORIDE 0.9 % (FLUSH) 0.9 %
10 SYRINGE (ML) INJECTION PRN
Status: DISCONTINUED | OUTPATIENT
Start: 2020-01-12 | End: 2020-01-14 | Stop reason: HOSPADM

## 2020-01-12 RX ORDER — LEVOTHYROXINE SODIUM 0.07 MG/1
75 TABLET ORAL DAILY
Status: DISCONTINUED | OUTPATIENT
Start: 2020-01-13 | End: 2020-01-14 | Stop reason: HOSPADM

## 2020-01-12 RX ORDER — SODIUM CHLORIDE 0.9 % (FLUSH) 0.9 %
10 SYRINGE (ML) INJECTION EVERY 12 HOURS SCHEDULED
Status: DISCONTINUED | OUTPATIENT
Start: 2020-01-12 | End: 2020-01-14 | Stop reason: HOSPADM

## 2020-01-12 RX ORDER — CA/D3/MAG OX/ZINC/COP/MANG/BOR 600 MG-800
1 TABLET,CHEWABLE ORAL DAILY
Status: DISCONTINUED | OUTPATIENT
Start: 2020-01-12 | End: 2020-01-12

## 2020-01-12 RX ORDER — FUROSEMIDE 20 MG/1
20 TABLET ORAL EVERY OTHER DAY
Status: DISCONTINUED | OUTPATIENT
Start: 2020-01-12 | End: 2020-01-12

## 2020-01-12 RX ORDER — LACTOBACILLUS RHAMNOSUS GG 10B CELL
1 CAPSULE ORAL
Status: DISCONTINUED | OUTPATIENT
Start: 2020-01-13 | End: 2020-01-14 | Stop reason: HOSPADM

## 2020-01-12 RX ORDER — POLYETHYLENE GLYCOL 3350 17 G/17G
17 POWDER, FOR SOLUTION ORAL DAILY PRN
Status: DISCONTINUED | OUTPATIENT
Start: 2020-01-12 | End: 2020-01-14 | Stop reason: HOSPADM

## 2020-01-12 RX ORDER — SOTALOL HYDROCHLORIDE 80 MG/1
80 TABLET ORAL 2 TIMES DAILY
Status: DISCONTINUED | OUTPATIENT
Start: 2020-01-12 | End: 2020-01-14 | Stop reason: HOSPADM

## 2020-01-12 RX ADMIN — LOSARTAN POTASSIUM 100 MG: 100 TABLET, FILM COATED ORAL at 21:43

## 2020-01-12 RX ADMIN — SODIUM CHLORIDE: 9 INJECTION, SOLUTION INTRAVENOUS at 21:44

## 2020-01-12 RX ADMIN — CEFTRIAXONE 1 G: 1 INJECTION, POWDER, FOR SOLUTION INTRAMUSCULAR; INTRAVENOUS at 16:34

## 2020-01-12 RX ADMIN — SOTALOL HYDROCHLORIDE 80 MG: 80 TABLET ORAL at 21:43

## 2020-01-12 RX ADMIN — AZITHROMYCIN MONOHYDRATE 500 MG: 500 INJECTION, POWDER, LYOPHILIZED, FOR SOLUTION INTRAVENOUS at 17:25

## 2020-01-12 RX ADMIN — SODIUM CHLORIDE, PRESERVATIVE FREE 10 ML: 5 INJECTION INTRAVENOUS at 21:23

## 2020-01-12 RX ADMIN — DOXYCYCLINE 100 MG: 100 INJECTION, POWDER, LYOPHILIZED, FOR SOLUTION INTRAVENOUS at 21:43

## 2020-01-12 ASSESSMENT — ENCOUNTER SYMPTOMS
ABDOMINAL PAIN: 0
EYES NEGATIVE: 1
GASTROINTESTINAL NEGATIVE: 1
COUGH: 1
SHORTNESS OF BREATH: 1
NAUSEA: 0
VOMITING: 0

## 2020-01-12 ASSESSMENT — PAIN SCALES - GENERAL
PAINLEVEL_OUTOF10: 0

## 2020-01-12 NOTE — ED PROVIDER NOTES
[address] [facility]        Pt Name: Inga Graff  MRN: [id number]  [place] 3/31/1927  Date of evaluation: 1/12/2020  Provider: Angela Yoon MD  PCP: Raiford Duane, MD      [address] [place] [address]       Chief Complaint   Patient presents with    Fatigue     Pt started feeling ill a couple days ago, denies N/V/D, states she has had a cough and feels run down. States family at home have been sick with same symptoms. Denies SOB or chest pain. HISTORY OFPRESENT ILLNESS   (Location/Symptom, Timing/Onset, Context/Setting, Quality, Duration, Modifying Factors,Severity)  Note limiting factors. Inga Graff is a 80 y.o. female with history of complete heart block with pacemaker in place, history of PE on Xarelto and reporting compliance, CKD stage III, chronic generalized weakness, ambulates with walker at baseline, presenting due to general fatigue and weakness, cough occasionally productive of some greenish/yellow sputum, shortness of breath, especially with ambulation. Sick contacts at home with similar upper respiratory symptoms. No fevers known. No associated chest pain. Nursing Notes were all reviewed and agreed with or any disagreements were addressed  in the HPI. REVIEW OF SYSTEMS    (2-9 systems for level 4, 10 or more for level 5)     Review of Systems   Constitutional: Positive for fatigue. Negative for activity change, chills and fever. HENT: Negative. Eyes: Negative. Respiratory: Positive for cough and shortness of breath. Cardiovascular: Negative. Gastrointestinal: Negative. Negative for abdominal pain, nausea and vomiting. Genitourinary: Negative. Negative for dysuria and flank pain. Musculoskeletal: Negative. Skin: Negative. Neurological: Positive for weakness (general). Hematological: Negative. Psychiatric/Behavioral: Negative.           PAST MEDICAL HISTORY     Past Medical History: needed for Sleep       ALLERGIES     Demerol; Dilaudid [hydromorphone hcl]; Hydrochlorothiazide; Macrobid [nitrofurantoin monohydrate macrocrystals]; Other; Sulfa antibiotics; and Tetanus toxoids    FAMILY HISTORY     History reviewed. No pertinent family history. SOCIAL HISTORY       Social History     Socioeconomic History    Marital status:      Spouse name: None    Number of children: None    Years of education: None    Highest education level: None   Occupational History    None   Social Needs    Financial resource strain: None    Food insecurity:     Worry: None     Inability: None    Transportation needs:     Medical: None     Non-medical: None   Tobacco Use    Smoking status: Former Smoker     Last attempt to quit: 6/15/1965     Years since quittin.5    Smokeless tobacco: Never Used   Substance and Sexual Activity    Alcohol use: No    Drug use: No    Sexual activity: None   Lifestyle    Physical activity:     Days per week: None     Minutes per session: None    Stress: None   Relationships    Social connections:     Talks on phone: None     Gets together: None     Attends Scientologist service: None     Active member of club or organization: None     Attends meetings of clubs or organizations: None     Relationship status: None    Intimate partner violence:     Fear of current or ex partner: None     Emotionally abused: None     Physically abused: None     Forced sexual activity: None   Other Topics Concern    None   Social History Narrative    None       SCREENINGS             PHYSICAL EXAM    (up to 7 for level 4, 8 or more for level 5)     ED Triage Vitals [20 1408]   BP Temp Temp Source Pulse Resp SpO2 Height Weight   (!) 150/63 98.8 °F (37.1 °C) Oral 73 18 93 % 5' 1\" (1.549 m) 145 lb 4.5 oz (65.9 kg)      height is 5' 1\" (1.549 m) and weight is 145 lb 4.5 oz (65.9 kg). Her oral temperature is 98.8 °F (37.1 °C).  Her blood pressure is 146/64 (abnormal) and her pulse is 68. Her respiration is 23 and oxygen saturation is 92%. Physical Exam  Constitutional: Chronically ill-appearing. HENT:   Head: Normocephalic and atraumatic. Eyes: Conjunctivae and EOM are normal.   Neck: Neck supple. No JVD present. Cardiovascular: Normal rate and intact distal pulses. Radial pulses strong and equal.  Extremities warm and well perfused. Pulmonary/Chest: Lungs clear to auscultation. Tachypneic with respiratory rate in the low 20s, oxygen saturation low 90s on room air at rest.  No distress, no accessory muscle involvement. Abdominal: Exhibits no distension. Neurological: Alert. Answering questions appropriately. Musculoskeletal: No lower extremity edema. 5 out of 5 strength in all extremities. Skin: Skin is warm and dry. Psychiatric: Normal mood and affect. Behavior is normal.   Nursing note and vitals reviewed.       DIAGNOSTIC RESULTS   LABS:    Results for orders placed or performed during the hospital encounter of 01/12/20   Rapid influenza A/B antigens   Result Value Ref Range    Rapid Influenza A Ag Negative Negative    Rapid Influenza B Ag Negative Negative   CBC Auto Differential   Result Value Ref Range    WBC 11.7 (H) 4.0 - 11.0 K/uL    RBC 4.46 4.00 - 5.20 M/uL    Hemoglobin 12.6 12.0 - 16.0 g/dL    Hematocrit 38.0 36.0 - 48.0 %    MCV 85.1 80.0 - 100.0 fL    MCH 28.3 26.0 - 34.0 pg    MCHC 33.2 31.0 - 36.0 g/dL    RDW 21.2 (H) 12.4 - 15.4 %    Platelets 540 649 - 034 K/uL    MPV 6.6 5.0 - 10.5 fL    Neutrophils % 82.6 %    Lymphocytes % 5.6 %    Monocytes % 10.9 %    Eosinophils % 0.7 %    Basophils % 0.2 %    Neutrophils Absolute 9.7 (H) 1.7 - 7.7 K/uL    Lymphocytes Absolute 0.7 (L) 1.0 - 5.1 K/uL    Monocytes Absolute 1.3 0.0 - 1.3 K/uL    Eosinophils Absolute 0.1 0.0 - 0.6 K/uL    Basophils Absolute 0.0 0.0 - 0.2 K/uL   Comprehensive Metabolic Panel w/ Reflex to MG   Result Value Ref Range    Sodium 133 (L) 136 - 145 mmol/L    Potassium reflex Magnesium 3.6 3.5 medications on file       DISCONTINUED MEDICATIONS:  Discontinued Medications    No medications on file                (Please note that portions of this note were completed with a voice recognition program.  Efforts were made to edit the dictations but occasionally words are mis-transcribed.)    Ena Hays MD (electronically signed)           Ena Hays MD  01/12/20 3675

## 2020-01-12 NOTE — H&P
Taking? Authorizing Provider   sotalol (BETAPACE) 80 MG tablet TAKE ONE TABLET BY MOUTH TWICE A DAY 11/22/19   SABA Alvarez CNP   sotalol (BETAPACE) 80 MG tablet TAKE 1 TABLET BY MOUTH TWO  TIMES DAILY 11/18/19   SABA Alvarez CNP   losartan (COZAAR) 100 MG tablet Take 1 tablet by mouth nightly 11/11/19   Ji Post DO   traZODone (DESYREL) 100 MG tablet Take 1-2 tablets by mouth nightly as needed for Sleep 11/7/19 12/7/19  Berenice Baptiste MD   traMADol (ULTRAM) 50 MG tablet Take 1 tablet by mouth every 6 hours as needed for Pain for up to 30 days. 11/7/19 12/7/19  Berenice Baptiste MD   levothyroxine (SYNTHROID) 75 MCG tablet TAKE 1 TABLET BY MOUTH  DAILY 9/30/19   SABA Alvarez CNP   sotalol AF 80 MG TABS Take 80 mg by mouth 2 times daily 8/5/19   Angelita Nones, APRN - CNP   rivaroxaban (XARELTO) 15 MG TABS tablet TAKE ONE TABLET BY MOUTH DAILY WITH BREAKFAST 7/29/19   SABA Alvarez CNP   ipratropium (ATROVENT) 0.06 % nasal spray SPRAY ONE TO TWO SPRAYS IN EACH NOSTRIL THREE TIMES A DAY AS NEEDED FOR RHINITIS 6/12/19   Berenice Baptiste MD   spironolactone (ALDACTONE) 25 MG tablet Take 1 tablet by mouth daily 4/10/19   Berenice Baptiste MD   furosemide (LASIX) 20 MG tablet Take 1 tablet by mouth every other day 9/14/17   Fariba Gonzalez MD   Probiotic Product (PROBIOTIC ADVANCED PO) Take by mouth daily    Historical Provider, MD   omeprazole 20 MG EC tablet Take 20 mg by mouth daily    Historical Provider, MD   Multiple Vitamins-Minerals (THERAPEUTIC MULTIVITAMIN-MINERALS) tablet Take 1 tablet by mouth daily. Historical Provider, MD   Calcium Carbonate Antacid (MAALOX) 600 MG CHEW Take 1 tablet by mouth daily as needed     Historical Provider, MD   diphenhydrAMINE (BENADRYL) 25 MG tablet Take 25 mg by mouth nightly as needed. Historical Provider, MD       Allergies:  Demerol; Dilaudid [hydromorphone hcl];  Hydrochlorothiazide; Macrobid [nitrofurantoin monohydrate macrocrystals]; Other; Sulfa antibiotics; and Tetanus toxoids    Social History:      The patient currently lives at home    TOBACCO:   reports that she quit smoking about 54 years ago. She has never used smokeless tobacco.  ETOH:   reports no history of alcohol use. E-Cigarettes Vaping or Juuling     Questions Responses    E-Cigarette Use     Start Date     Does device contain nicotine? Quit Date     E-Cigarette Type             Family History:      Reviewed in detail and negative for DM, CAD, Cancer, CVA. Positive as follows:    History reviewed. No pertinent family history. REVIEW OF SYSTEMS:   Pertinent positives as noted in the HPI. Weakness and dry cough. All other systems reviewed and negative. PHYSICAL EXAM PERFORMED:    BP (!) 146/64   Pulse 68   Temp 98.8 °F (37.1 °C) (Oral)   Resp 23   Ht 5' 1\" (1.549 m)   Wt 145 lb 4.5 oz (65.9 kg)   LMP  (Exact Date)   SpO2 92%   BMI 27.45 kg/m²     General appearance:  No apparent distress, appears stated age and cooperative. HEENT:  Normal cephalic, atraumatic without obvious deformity. Pupils equal, round, and reactive to light. Extra ocular muscles intact. Conjunctivae/corneas clear. Neck: Supple, with full range of motion. No jugular venous distention. Trachea midline. Respiratory:  Normal respiratory effort. Posterior left basilar inspiratory crackles  Cardiovascular:  Regular rate and rhythm with normal S1/S2 without murmurs, rubs or gallops. Abdomen: Soft, non-tender, non-distended with normal bowel sounds. Musculoskeletal:  No clubbing, cyanosis or edema bilaterally. Has upper extremity deformities related to chronic arthritis  Skin: Skin color, texture, turgor normal.  No rashes or lesions. Neurologic:  Neurovascularly intact without any focal sensory/motor deficits.  Cranial nerves: II-XII intact, grossly non-focal.  Psychiatric:  Alert and oriented, thought content appropriate, normal insight  Capillary Refill: Brisk,< 3 seconds patient    Discussed with the emergency room physician    DVT Prophylaxis: Already on Xarelto  Diet: No diet orders on file  Code Status: Prior    PT/OT Eval Status: Requested    Dispo -inpatient, 2 or 3 more days       Maribell Pacheco MD    Thank you Drew Horton MD for the opportunity to be involved in this patient's care. If you have any questions or concerns please feel free to contact me at [phone].

## 2020-01-13 LAB
A/G RATIO: 1 (ref 1.1–2.2)
ALBUMIN SERPL-MCNC: 2.9 G/DL (ref 3.4–5)
ALP BLD-CCNC: 64 U/L (ref 40–129)
ALT SERPL-CCNC: 16 U/L (ref 10–40)
ANION GAP SERPL CALCULATED.3IONS-SCNC: 14 MMOL/L (ref 3–16)
AST SERPL-CCNC: 22 U/L (ref 15–37)
BASOPHILS ABSOLUTE: 0 K/UL (ref 0–0.2)
BASOPHILS RELATIVE PERCENT: 0.2 %
BILIRUB SERPL-MCNC: 0.6 MG/DL (ref 0–1)
BUN BLDV-MCNC: 11 MG/DL (ref 7–20)
CALCIUM SERPL-MCNC: 8.7 MG/DL (ref 8.3–10.6)
CHLORIDE BLD-SCNC: 97 MMOL/L (ref 99–110)
CO2: 24 MMOL/L (ref 21–32)
CREAT SERPL-MCNC: 0.7 MG/DL (ref 0.6–1.2)
EKG ATRIAL RATE: 67 BPM
EKG DIAGNOSIS: NORMAL
EKG P AXIS: 72 DEGREES
EKG P-R INTERVAL: 254 MS
EKG Q-T INTERVAL: 424 MS
EKG QRS DURATION: 136 MS
EKG QTC CALCULATION (BAZETT): 448 MS
EKG R AXIS: -67 DEGREES
EKG T AXIS: 114 DEGREES
EKG VENTRICULAR RATE: 67 BPM
EOSINOPHILS ABSOLUTE: 0.1 K/UL (ref 0–0.6)
EOSINOPHILS RELATIVE PERCENT: 0.7 %
GFR AFRICAN AMERICAN: >60
GFR NON-AFRICAN AMERICAN: >60
GLOBULIN: 3 G/DL
GLUCOSE BLD-MCNC: 113 MG/DL (ref 70–99)
HCT VFR BLD CALC: 34.3 % (ref 36–48)
HEMOGLOBIN: 11.8 G/DL (ref 12–16)
L. PNEUMOPHILA SEROGP 1 UR AG: NORMAL
LYMPHOCYTES ABSOLUTE: 0.9 K/UL (ref 1–5.1)
LYMPHOCYTES RELATIVE PERCENT: 9.2 %
MAGNESIUM: 1.8 MG/DL (ref 1.8–2.4)
MCH RBC QN AUTO: 29 PG (ref 26–34)
MCHC RBC AUTO-ENTMCNC: 34.4 G/DL (ref 31–36)
MCV RBC AUTO: 84.3 FL (ref 80–100)
MONOCYTES ABSOLUTE: 1.3 K/UL (ref 0–1.3)
MONOCYTES RELATIVE PERCENT: 12.8 %
NEUTROPHILS ABSOLUTE: 7.8 K/UL (ref 1.7–7.7)
NEUTROPHILS RELATIVE PERCENT: 77.1 %
PDW BLD-RTO: 20.4 % (ref 12.4–15.4)
PLATELET # BLD: 241 K/UL (ref 135–450)
PMV BLD AUTO: 6.6 FL (ref 5–10.5)
POTASSIUM REFLEX MAGNESIUM: 3.4 MMOL/L (ref 3.5–5.1)
PROCALCITONIN: 0.23 NG/ML (ref 0–0.15)
RBC # BLD: 4.07 M/UL (ref 4–5.2)
SODIUM BLD-SCNC: 135 MMOL/L (ref 136–145)
STREP PNEUMONIAE ANTIGEN, URINE: NORMAL
TOTAL PROTEIN: 5.9 G/DL (ref 6.4–8.2)
WBC # BLD: 10.2 K/UL (ref 4–11)

## 2020-01-13 PROCEDURE — 97166 OT EVAL MOD COMPLEX 45 MIN: CPT

## 2020-01-13 PROCEDURE — 85025 COMPLETE CBC W/AUTO DIFF WBC: CPT

## 2020-01-13 PROCEDURE — 6360000002 HC RX W HCPCS: Performed by: INTERNAL MEDICINE

## 2020-01-13 PROCEDURE — 2580000003 HC RX 258: Performed by: INTERNAL MEDICINE

## 2020-01-13 PROCEDURE — 87449 NOS EACH ORGANISM AG IA: CPT

## 2020-01-13 PROCEDURE — 97535 SELF CARE MNGMENT TRAINING: CPT

## 2020-01-13 PROCEDURE — 84145 PROCALCITONIN (PCT): CPT

## 2020-01-13 PROCEDURE — 36415 COLL VENOUS BLD VENIPUNCTURE: CPT

## 2020-01-13 PROCEDURE — 93010 ELECTROCARDIOGRAM REPORT: CPT | Performed by: INTERNAL MEDICINE

## 2020-01-13 PROCEDURE — 6370000000 HC RX 637 (ALT 250 FOR IP): Performed by: INTERNAL MEDICINE

## 2020-01-13 PROCEDURE — 94760 N-INVAS EAR/PLS OXIMETRY 1: CPT

## 2020-01-13 PROCEDURE — 97116 GAIT TRAINING THERAPY: CPT

## 2020-01-13 PROCEDURE — 83735 ASSAY OF MAGNESIUM: CPT

## 2020-01-13 PROCEDURE — 2500000003 HC RX 250 WO HCPCS: Performed by: INTERNAL MEDICINE

## 2020-01-13 PROCEDURE — 1200000000 HC SEMI PRIVATE

## 2020-01-13 PROCEDURE — 97161 PT EVAL LOW COMPLEX 20 MIN: CPT

## 2020-01-13 PROCEDURE — 92610 EVALUATE SWALLOWING FUNCTION: CPT

## 2020-01-13 PROCEDURE — 80053 COMPREHEN METABOLIC PANEL: CPT

## 2020-01-13 RX ORDER — POTASSIUM CHLORIDE 20 MEQ/1
40 TABLET, EXTENDED RELEASE ORAL PRN
Status: DISCONTINUED | OUTPATIENT
Start: 2020-01-13 | End: 2020-01-14 | Stop reason: HOSPADM

## 2020-01-13 RX ORDER — POTASSIUM CHLORIDE 7.45 MG/ML
10 INJECTION INTRAVENOUS PRN
Status: DISCONTINUED | OUTPATIENT
Start: 2020-01-13 | End: 2020-01-14 | Stop reason: HOSPADM

## 2020-01-13 RX ADMIN — CEFTRIAXONE 1 G: 1 INJECTION, POWDER, FOR SOLUTION INTRAMUSCULAR; INTRAVENOUS at 17:31

## 2020-01-13 RX ADMIN — DOXYCYCLINE 100 MG: 100 INJECTION, POWDER, LYOPHILIZED, FOR SOLUTION INTRAVENOUS at 08:31

## 2020-01-13 RX ADMIN — LOSARTAN POTASSIUM 100 MG: 100 TABLET, FILM COATED ORAL at 20:51

## 2020-01-13 RX ADMIN — DOXYCYCLINE 100 MG: 100 INJECTION, POWDER, LYOPHILIZED, FOR SOLUTION INTRAVENOUS at 20:51

## 2020-01-13 RX ADMIN — LEVOTHYROXINE SODIUM 75 MCG: 75 TABLET ORAL at 07:00

## 2020-01-13 RX ADMIN — FUROSEMIDE 20 MG: 20 TABLET ORAL at 08:31

## 2020-01-13 RX ADMIN — RIVAROXABAN 15 MG: 15 TABLET, FILM COATED ORAL at 08:31

## 2020-01-13 RX ADMIN — SOTALOL HYDROCHLORIDE 80 MG: 80 TABLET ORAL at 08:31

## 2020-01-13 RX ADMIN — SPIRONOLACTONE 25 MG: 25 TABLET ORAL at 08:31

## 2020-01-13 RX ADMIN — SODIUM CHLORIDE, PRESERVATIVE FREE 10 ML: 5 INJECTION INTRAVENOUS at 08:32

## 2020-01-13 RX ADMIN — MULTIPLE VITAMINS W/ MINERALS TAB 1 TABLET: TAB at 08:31

## 2020-01-13 RX ADMIN — Medication 1 CAPSULE: at 08:31

## 2020-01-13 RX ADMIN — PANTOPRAZOLE SODIUM 40 MG: 40 TABLET, DELAYED RELEASE ORAL at 07:00

## 2020-01-13 RX ADMIN — SOTALOL HYDROCHLORIDE 80 MG: 80 TABLET ORAL at 20:51

## 2020-01-13 ASSESSMENT — PAIN SCALES - GENERAL
PAINLEVEL_OUTOF10: 0

## 2020-01-13 ASSESSMENT — PAIN - FUNCTIONAL ASSESSMENT: PAIN_FUNCTIONAL_ASSESSMENT: ACTIVITIES ARE NOT PREVENTED

## 2020-01-13 NOTE — PROGRESS NOTES
history of Atrial fibrillation ([facility] [address] Cancer ([facility] [address] Hypertension, Hypothyroidism, Leg DVT (deep venous thromboembolism), acute ([address] Osteoarthritis, PE (pulmonary embolism), and Scoliosis. has a past surgical history that includes Hysterectomy; Pacemaker insertion; ERCP (6/17/2013); Cholecystectomy, laparoscopic (4,11,5230); and Upper gastrointestinal endoscopy (10/21/2016). Restrictions  Restrictions/Precautions  Restrictions/Precautions: Fall Risk  Vision/Hearing  Vision: Impaired  Vision Exceptions: Wears glasses for reading  Hearing: Exceptions to [facility]  Hearing Exceptions: Hard of hearing/hearing concerns;Bilateral hearing aid     Subjective  General  Chart Reviewed: Yes  Additional Pertinent Hx: Per H&P on 1- of Kate Gatica MD: The pt is a 79 yo female who came to the ED with progressive cough, weakness, fatigue.      PMHx: a-fib, gallbladder Ca, HTN, hypothyroidism, DVT, OA, PE, scoliosis, pacemaker  Response To Previous Treatment: Not applicable  Family / Caregiver Present: No  Referring Practitioner: Kate Gatica MD  Referral Date : 01/12/20  Diagnosis: CAP  Follows Commands: Within Functional Limits  Subjective  Subjective: denies pain          Orientation  Orientation  Overall Orientation Status: Within Functional Limits  Orientation Level: Oriented to person;Oriented to time;Oriented to place;Oriented to situation  Social/Functional History  Social/Functional History  Lives With: Family(dgt and s-i-l, pt reports usually someone there all the time)  Type of Home: House  Home Layout: Able to Live on Main level with bedroom/bathroom  Home Access: Stairs to enter without rails  Entrance Stairs - Number of Steps: 1 large step access  (reports he s-i-l assists her)  Bathroom Shower/Tub: Walk-in shower(doing sponge baths)  Bathroom Toilet: Handicap height(safety frame on toilet)  Bathroom Accessibility: Walker accessible  Home Equipment: Quad cane, Reacher, Rolling walker, Wheelchair-manual  ADL Assistance: Independent(pt reports she is indep with sponge bath, dressing and toileting)  Homemaking Assistance: (s-i-l does all; pt manages own meds and finaces)  Ambulation Assistance: Independent(uses 2 wheeled walker all the time)  Transfer Assistance: Independent(sleeps in regular bed)  Active : No  Patient's  Info: Family takes pt to MD appointments  Occupation: Retired  Type of occupation: Pharmacist, travel counselor  Additional Comments: denies recent falls  Cognition   Cognition  Overall Cognitive Status: Exceptions  Safety Judgement: Decreased awareness of need for safety    Objective  AROM RLE (degrees)  RLE AROM: WFL  AROM LLE (degrees)  LLE AROM : WFL  Strength RLE  Strength RLE: WFL  Strength LLE  Strength LLE: WFL        Bed mobility  Supine to Sit: Stand by assistance(HOB flat)  Sit to Supine: Unable to assess(the pt up to the chair at end of session)  Transfers  Sit to Stand: Stand by assistance  Stand to sit: Stand by assistance  Ambulation  Ambulation?: Yes  Ambulation 1  Surface: level tile  Device: Rolling Walker  Quality of Gait: severely flexed posture, walks behind the frame of the walker and on turns has feet outside the frame of the walker; no LOB  Distance: 10 feet x 1, 25 feet x 1  (thept declined walking further in the hallway due to \"germs\")  Stairs/Curb  Stairs?: No     Balance  Posture: Poor  Sitting - Static: Good  Sitting - Dynamic: Good  Standing - Static: Fair;+  Comments: stood at the sink x 3 minutes with close SBA; seated EOB indep        Plan   Plan  Times per week: 1-2x/week  Current Treatment Recommendations: Strengthening, ROM, Functional Mobility Training  Safety Devices  Type of devices: Call light within reach, Chair alarm in place, Gait belt, Patient at risk for falls, Left in chair      AM-PAC Score  AM-PAC Inpatient Mobility Raw Score : 17 (01/13/20 1153)  AM-PAC Inpatient T-Scale Score : 42.13 (01/13/20 1153)  Mobility Inpatient CMS 0-100% Score: 50.57 (01/13/20 1153)  Mobility Inpatient CMS G-Code Modifier : CK (01/13/20 1153)          Goals  Short term goals  Time Frame for Short term goals: upon d/c  Short term goal 1: Bed mobility with mod I. Short term goal 2: Transferss it <> stand with Sup/mod I. Short term goal 3: Ambulate with wheeled walker 50 feet with SBA/Sup.   Patient Goals   Patient goals : to go home tomorrow       Therapy Time   Individual Concurrent Group Co-treatment   Time In 2053         Time Out 1203         Minutes 32         Timed Code Treatment Minutes: 17 Minutes     Electronically signed by Mike Tejada, PT [id number] on 1/13/2020 at 12:04 PM

## 2020-01-13 NOTE — PLAN OF CARE
Problem: Falls - Risk of:  Goal: Will remain free from falls  Description  Will remain free from falls  Outcome: Ongoing  Pt will remain free from falls by using appropriate safety awareness/judgement and alert the RN/PCA when help is needed. Goal: Absence of physical injury  Description  Absence of physical injury  Outcome: Ongoing  Pt will be free from injury by using appropriate safety awareness and judgement. Pt will call for help when needed. RN will continue to do safety checks and ask if pt needs help to the bathroom in advance to prevent falls.            Problem: Discharge Planning:  Goal: Discharged to appropriate level of care  Description  Discharged to appropriate level of care  Outcome: Ongoing  Goal: Participates in care planning  Description  Participates in care planning  Outcome: Ongoing     Problem: Airway Clearance - Ineffective:  Goal: Clear lung sounds  Description  Clear lung sounds  Outcome: Ongoing  Goal: Ability to maintain a clear airway will improve  Description  Ability to maintain a clear airway will improve  Outcome: Ongoing

## 2020-01-13 NOTE — PROGRESS NOTES
Speech Language Pathology  Facility/Department: [facility] SURG   CLINICAL BEDSIDE SWALLOW EVALUATION    NAME: Zeferino   : 3/31/1927  MRN: [id number]    ADMISSION DATE: 2020  ADMITTING DIAGNOSIS: has Atrial fibrillation ([facility] Long term current use of anticoagulant therapy; Hypothyroidism; Hypercholesteremia; Choledocholithiasis with obstruction; Hypertension; Adenocarcinoma of gallbladder ([facility] Arthritis; Allergic rhinitis; Cardiac pacemaker in situ; DJD (degenerative joint disease) of cervical spine; DJD (degenerative joint disease) of thoracic spine; Puckering of left macula; CHB (complete heart block) ([facility] Diastolic dysfunction, left ventricle; DJD (degenerative joint disease), lumbosacral; Drusen of right macula; Dry eye syndrome; Encounter for long-term (current) use of other medications; Cellophane retinopathy; Essential hypertension, benign; Female stress incontinence; Iron deficiency anemia; SSS (sick sinus syndrome) ([facility] Venous (peripheral) insufficiency; Sleep disorder; Phlebitis and thrombophlebitis of other deep vessels of lower extremities; Pernicious anemia; Other pulmonary embolism and infarction; Osteoarthritis of lumbar spine; Keratoconjunctivitis sicca of both eyes not specified as Sjogren's; Environmental and seasonal allergies; Renal insufficiency; CKD (chronic kidney disease), stage III ([facility] Essential hypertension; Acute metabolic encephalopathy; Generalized weakness; and Pneumonia on their problem list.  ONSET DATE: 2020    History Of Present Illness:     80 y.o. female who presented to [facility] emergency room with fatigue weakness and dry cough that started 2 days ago and got progressively worse. Noted some sputum greenish-yellow in color over past 24 hours. Also had some shortness of breath with exertion in past 24 hours, as well. Had some sick contacts with people with similar symptoms.   No fevers, no chills  No other accompanying symptoms  Did not take any medications for this. Came to the emergency room because of worsening symptoms. Had similar episodes in the past.  One episode of shortness of breath resulted in diagnosis of pulmonary embolism. Nothing else that makes the patient feel better or worse. Recent Chest Xray 1/12/2020  Impression   Retrocardiac opacity seen on the lateral view suspicious for pneumonia in the   appropriate clinical setting. Date of Eval: 1/13/2020  Evaluating Therapist: Treasure Huertas    Current Diet level:  Current Diet : Regular  Current Liquid Diet : Thin    Primary Complaint  Patient Complaint: pt denies any concerns except for 'very occasional' difficulty swallowing a bagel and 'it won't go down and little pieces come back up.'     Pain:  Pain Assessment  Pain Assessment: 0-10  Pain Level: 0    Reason for Referral  Con Mejia was referred for a bedside swallow evaluation to assess the efficiency of her swallow function, identify signs and symptoms of aspiration and make recommendations regarding safe dietary consistencies, effective compensatory strategies, and safe eating environment. Impression  Dysphagia Diagnosis: Mild pharyngeal stage dysphagia  Dysphagia Impression : Mild pharyngeal dysphagia characterized by mildly decreased laryngeal elevation with no immediate overt clinical s/s of aspiration. · Pt had congested cough x1, delayed after thin liquids, which was similar in quality to her congested cough present prior to any PO trials, and appears to be r/t medical status rather than actual aspiration. · ST will follow to confirm adequate diet toleration    Dysphagia Outcome Severity Scale: Level 5: Mild dysphagia- Distant supervision. May need one diet consistency restricted     Treatment Plan  Requires SLP Intervention: Yes  Duration/Frequency of Treatment: 1-3x while on acute unit  D/C Recommendations:  To be determined     Recommended Diet and Intervention  Diet Solids Recommendation: Regular  Liquid Consistency Recommendation: Thin  Recommended Form of Meds: PO     Therapeutic Interventions: Diet tolerance monitoring;Patient/Family education    Compensatory Swallowing Strategies  Compensatory Swallowing Strategies: Remain upright for 30-45 minutes after meals;Upright as possible for all oral intake;Small bites/sips    Treatment/Goals  Short-term Goals  Timeframe for Short-term Goals: 1wk  Dysphagia Goals: The patient will tolerate recommended diet without observed clinical signs of aspiration; The patient will recall and perform compensatory strategies, with no cues. General  Chart Reviewed: Yes  Subjective  Subjective: Cooperative and pleasant in bedside chair; denies any concerns with chewing or swallowing; follows directions well and communication appears [facility]  Behavior/Cognition: Alert; Cooperative;Pleasant mood  Respiratory Status: Room air  Communication Observation: Functional  Follows Directions: Complex  Dentition: Adequate  Patient Positioning: Upright in chair  Baseline Vocal Quality: Normal  Volitional Cough: Congested  Consistencies Administered: Reg solid; Dysphagia Pureed (Dysphagia I); Nectar - cup; Thin - straw     Vision/Hearing  Vision  Vision: Impaired  Vision Exceptions: Wears glasses for reading  Hearing  Hearing: Exceptions to [facility]  Hearing Exceptions: Hard of hearing/hearing concerns;Bilateral hearing aid    Oral Motor Deficits  Oral/Motor  Oral Motor: Within functional limits    Oral Phase Dysfunction  Oral Phase  Oral Phase: WFL     Indicators of Pharyngeal Phase Dysfunction   Pharyngeal Phase  Pharyngeal Phase: Exceptions  Indicators of Pharyngeal Phase Dysfunction  Decreased Laryngeal Elevation: All  Pharyngeal Phase   Pharyngeal: Mildly decreased laryngeal elevation with no immediate overt clinical s/s of aspiration.   Pt had congested cough x1, delayed after thin liquids, which was similar in quality to her congested cough present prior to any PO trials, and appears to be r/t medical status rather than actual aspiration. ST will follow to confirm adequate diet toleration     Prognosis  Prognosis  Prognosis for safe diet advancement: good  Barriers to reach goals: age  Individuals consulted  Consulted and agree with results and recommendations: Patient    Education  Patient Education: results and recs  Patient Education Response: Verbalizes understanding     Therapy Time  SLP Individual Minutes  Time In: 9300  Time Out: 4329  Minutes: 25      This note serves as a D/C Summary in the event that this patient is discharged prior to the next therapy session.     Jaime Irvin MS, CCC-SLP #[id number]  Speech Language Pathologist    1/13/2020 12:33 PM

## 2020-01-13 NOTE — PROGRESS NOTES
Pt c/o soreness to mid back, requesting a patch be placed. Large mepilex border placed to mid back over bony spinal areas. Pt verbalized comfort.

## 2020-01-13 NOTE — PROGRESS NOTES
Occupational Therapy   Occupational Therapy Initial Assessment  Date: 2020   Patient Name: Kristina Castillo  MRN: [id number]     : 3/31/1927    Date of Service: 2020    Discharge Recommendations:  Home with assist PRN  OT Equipment Recommendations  Equipment Needed: No    Assessment   Performance deficits / Impairments: Decreased functional mobility ; Decreased safe awareness;Decreased endurance;Decreased balance;Decreased posture;Decreased ADL status  Assessment: Pt is a 80 y.o. female admitted with CAP. At baseline, pt lives with daughter and MARIE, mod I ADLs and fxl mobility using walker. Family manages IADLs. Pt currently functioning slightly below baseline d/t the above deficits, today requiring SBA fxl transfers, SBA/CGA fxl mobility with walker, SBA dressing and grooming. Anticipate pt would require overall SBA/CGA for ADLs. Will cont to see on acute to address the above limitations and maximize pt's safety and independence. Anticipate pt will be safe to return home with assist prn. Pt denies need for home therapy. Prognosis: Good  Decision Making: Medium Complexity  History: see above  Exam: decreased ADL status, endurance, fxl mobility  Assistance / Modification: anticipate overall SBA/CGA for ADLs  OT Education: OT Role;Plan of Care;Transfer Training;ADL Adaptive Strategies  Barriers to Learning: hearing  REQUIRES OT FOLLOW UP: Yes  Activity Tolerance  Activity Tolerance: Patient Tolerated treatment well  Safety Devices  Safety Devices in place: Yes  Type of devices: Chair alarm in place; Left in chair;Call light within reach;Gait belt           Patient Diagnosis(es): The primary encounter diagnosis was Pneumonia due to organism. A diagnosis of Generalized weakness was also pertinent to this visit.      has a past medical history of Atrial fibrillation ([facility] Cancer ([facility] Hypertension, Hypothyroidism, Leg DVT (deep venous thromboembolism), acute ([facility] Osteoarthritis, PE (pulmonary embolism), and Scoliosis. has a past surgical history that includes Hysterectomy; Pacemaker insertion; ERCP (6/17/2013); Cholecystectomy, laparoscopic (9,29,4014); and Upper gastrointestinal endoscopy (10/21/2016). Restrictions  Restrictions/Precautions  Restrictions/Precautions: Fall Risk    Subjective   General  Chart Reviewed: Yes  Additional Pertinent Hx: Per Jose Matos MD's ED note 1/12: \"Elena Rodríguez is a 80 y.o. female with history of complete heart block with pacemaker in place, history of PE on Xarelto and reporting compliance, CKD stage III, chronic generalized weakness, ambulates with walker at baseline, presenting due to general fatigue and weakness, cough occasionally productive of some greenish/yellow sputum, shortness of breath, especially with ambulation. Sick contacts at home with similar upper respiratory symptoms. \" Pt admitted with CAP. Family / Caregiver Present: No  Referring Practitioner: Tr Hogan MD  Diagnosis: CAP  Subjective  Subjective: Pt met b/s for OT eval/tx with PT. Pt in bed on arrival, agreeable to participate in therapy. Pt denies pain. Pt very pleasant and cooperative.      Social/Functional History  Social/Functional History  Lives With: Family(dgt and s-i-l, pt reports usually someone there all the time)  Type of Home: House  Home Layout: Able to Live on Main level with bedroom/bathroom  Home Access: Stairs to enter without rails  Entrance Stairs - Number of Steps: 1 large step access  (reports he s-i-l assists her)  Bathroom Shower/Tub: Walk-in shower(doing sponge baths)  Bathroom Toilet: Handicap height(safety frame on toilet)  Bathroom Accessibility: Walker accessible  Home Equipment: Quad cane, Reacher, Rolling walker, [employer]  ADL Assistance: Independent(pt reports she is indep with sponge bath, dressing and toileting)  Homemaking Assistance: (s-i-l does all; pt manages own meds and finaces)  Ambulation Assistance: Independent(uses 2 wheeled

## 2020-01-13 NOTE — PLAN OF CARE
Problem: Falls - Risk of:  Goal: Will remain free from falls  Description  Will remain free from falls  Outcome: Ongoing  Goal: Absence of physical injury  Description  Absence of physical injury  Outcome: Ongoing     Problem: Discharge Planning:  Goal: Discharged to appropriate level of care  Description  Discharged to appropriate level of care  Outcome: Ongoing  Goal: Participates in care planning  Description  Participates in care planning  Outcome: Ongoing     Problem: Airway Clearance - Ineffective:  Goal: Clear lung sounds  Description  Clear lung sounds  Outcome: Ongoing  Goal: Ability to maintain a clear airway will improve  Description  Ability to maintain a clear airway will improve  Outcome: Ongoing

## 2020-01-13 NOTE — PROGRESS NOTES
4 Eyes Skin Assessment     The patient is being assess for  Admission    I agree that 2 RN's have performed a thorough Head to Toe Skin Assessment on the patient. ALL assessment sites listed below have been assessed. Areas assessed by both nurses:   [x]   Head, Face, and Ears   [x]   Shoulders, Back, and Chest  [x]   Arms, Elbows, and Hands   [x]   Coccyx, Sacrum, and IschIum  [x]   Legs, Feet, and Heels        Does the Patient have Skin Breakdown?   No         Toño Prevention initiated:  No   Wound Care Orders initiated:  No      [facility] nurse consulted for Pressure Injury (Stage 3,4, Unstageable, DTI, NWPT, and Complex wounds), New and Established Ostomies:  No      Nurse 1 eSignature: Electronically signed by Guille Conway RN on 1/13/20 at 6:42 AM    **SHARE this note so that the co-signing nurse is able to place an eSignature**    Nurse 2 eSignature: {Esignature:[id number]}

## 2020-01-13 NOTE — PROGRESS NOTES
in the last 72 hours. Respiratory Culture:  No results for input(s): [address] in the last 72 hours. AFB:No results for input(s): AFBSMEAR in the last 72 hours. Urine Culture  No results for input(s): LABURIN in the last 72 hours. RADIOLOGY:    XR CHEST STANDARD (2 VW)   Final Result   Retrocardiac opacity seen on the lateral view suspicious for pneumonia in the   appropriate clinical setting. CONSULTS:    None    ASSESSMENT AND PLAN:      Active Problems:    Pneumonia  Resolved Problems:    * No resolved hospital problems. *    Pneumonia  - cxr shows retrocardiac opacity  - ct abx  - urine studies pending    Afib  - ct home meds     Remote h/o afib  -ct ac    Hypothyroidism  -ct home meds    Htn  -monitor Bp  -ct home meds    Dislipidemia          DVT Prophylaxis: xarelto  Diet: DIET GENERAL;  Code Status: Full Code        Discharge plan -ct care    The patient and / or the family were informed of the results of any tests, a time was given to answer questions, a plan was proposed and they agreed with plan. Discussed with consulting physicians, nursing and social work     The note was completed using EMR. Every effort was made to ensure accuracy; however, inadvertent computerized transcription errors may be present.        Felipe Sawyer MD

## 2020-01-13 NOTE — PROGRESS NOTES
Pt admitted to 4110 via stretcher. Pt is A&O x 4, denies pain. Pt has a dry nonproductive cough, denies SOB. Pt is hard of hearing and ears bilateral hearing aides. Pt oriented to room, call light, fall precautions and POC. Orders to be reviewed. Will monitor.  Electronically signed by Mirna Yee RN on 1/12/2020 at 9:04 PM

## 2020-01-13 NOTE — ED NOTES
Report called to [address] [place] [address] of pts pain score and plan of care.       Cassandra Hollis RN  01/12/20 2006

## 2020-01-14 VITALS
TEMPERATURE: 97.7 F | WEIGHT: 126.98 LBS | RESPIRATION RATE: 18 BRPM | OXYGEN SATURATION: 95 % | HEART RATE: 71 BPM | SYSTOLIC BLOOD PRESSURE: 145 MMHG | BODY MASS INDEX: 23.98 KG/M2 | DIASTOLIC BLOOD PRESSURE: 68 MMHG | HEIGHT: 61 IN

## 2020-01-14 PROCEDURE — 6370000000 HC RX 637 (ALT 250 FOR IP): Performed by: INTERNAL MEDICINE

## 2020-01-14 PROCEDURE — 2580000003 HC RX 258: Performed by: INTERNAL MEDICINE

## 2020-01-14 PROCEDURE — 2500000003 HC RX 250 WO HCPCS: Performed by: INTERNAL MEDICINE

## 2020-01-14 PROCEDURE — 94760 N-INVAS EAR/PLS OXIMETRY 1: CPT

## 2020-01-14 RX ORDER — DOXYCYCLINE HYCLATE 100 MG
100 TABLET ORAL 2 TIMES DAILY
Qty: 10 TABLET | Refills: 0 | Status: SHIPPED | OUTPATIENT
Start: 2020-01-14 | End: 2020-01-18

## 2020-01-14 RX ORDER — CEFUROXIME AXETIL 250 MG/1
250 TABLET ORAL 2 TIMES DAILY
Qty: 8 TABLET | Refills: 0 | Status: SHIPPED | OUTPATIENT
Start: 2020-01-14 | End: 2020-01-18

## 2020-01-14 RX ADMIN — PANTOPRAZOLE SODIUM 40 MG: 40 TABLET, DELAYED RELEASE ORAL at 05:42

## 2020-01-14 RX ADMIN — SODIUM CHLORIDE: 9 INJECTION, SOLUTION INTRAVENOUS at 00:31

## 2020-01-14 RX ADMIN — SPIRONOLACTONE 25 MG: 25 TABLET ORAL at 08:30

## 2020-01-14 RX ADMIN — SOTALOL HYDROCHLORIDE 80 MG: 80 TABLET ORAL at 08:30

## 2020-01-14 RX ADMIN — Medication 1 CAPSULE: at 08:30

## 2020-01-14 RX ADMIN — SODIUM CHLORIDE, PRESERVATIVE FREE 10 ML: 5 INJECTION INTRAVENOUS at 08:31

## 2020-01-14 RX ADMIN — LEVOTHYROXINE SODIUM 75 MCG: 75 TABLET ORAL at 05:42

## 2020-01-14 RX ADMIN — MULTIPLE VITAMINS W/ MINERALS TAB 1 TABLET: TAB at 08:30

## 2020-01-14 RX ADMIN — DOXYCYCLINE 100 MG: 100 INJECTION, POWDER, LYOPHILIZED, FOR SOLUTION INTRAVENOUS at 08:30

## 2020-01-14 RX ADMIN — POTASSIUM CHLORIDE 40 MEQ: 20 TABLET, EXTENDED RELEASE ORAL at 00:31

## 2020-01-14 RX ADMIN — RIVAROXABAN 15 MG: 15 TABLET, FILM COATED ORAL at 08:30

## 2020-01-14 ASSESSMENT — PAIN - FUNCTIONAL ASSESSMENT: PAIN_FUNCTIONAL_ASSESSMENT: ACTIVITIES ARE NOT PREVENTED

## 2020-01-14 ASSESSMENT — PAIN SCALES - GENERAL
PAINLEVEL_OUTOF10: 0
PAINLEVEL_OUTOF10: 0

## 2020-01-14 NOTE — DISCHARGE SUMMARY
Hospital Medicine Discharge Summary      Patient ID: Geoff Anaya      Patient's PCP: Cami Stroud MD    Admit Date: 1/12/2020     Discharge Date: 1/14/2020  The patient was seen and examined on day of discharge and this discharge summary is in conjunction with any daily progress note from day of discharge. Admitting Physician: Jackie Francis MD    Discharge Physician: Mat Murphy MD     Admitted for   Chief Complaint   Patient presents with    Fatigue     Pt started feeling ill a couple days ago, denies N/V/D, states she has had a cough and feels run down. States family at home have been sick with same symptoms. Denies SOB or chest pain. Admitting Diagnosis Pneumonia [J18.9]  Pneumonia [J18.9]    Discharge Diagnoses: Active Hospital Problems    Diagnosis Date Noted    Pneumonia [J18.9] 01/12/2020       Follow Up: Primary Care Physician in one week    PCP to Follow up on   [address] Course:     Patient is a 35-year-old female with a past medical history of A. Fib, pulmonary embolism, hypothyroidism who presented with pneumonia. Patient was started on IV antibiotics with improvement. She is switched to oral antibiotics at the time of discharge.     Pneumonia  - cxr shows retrocardiac opacity  - ct abx        Afib  - ct home meds      Remote h/o PE,  -ct ac     Hypothyroidism  -ct home meds     Htn  -monitor Bp  -ct home meds     Dislipidemia    Consults:     None        Disposition: home    Discharged Condition: Stable    Code Status: Full Code    Activity: activity as tolerated    Diet: regular diet          Labs:  For convenience and continuity at follow-up the following most recent labs are provided:    CBC:   Lab Results   Component Value Date    WBC 10.2 01/13/2020    HGB 11.8 01/13/2020    HCT 34.3 01/13/2020     01/13/2020       RENAL:   Lab Results   Component Value Date     01/13/2020    K 3.4 01/13/2020    CL 97 01/13/2020    CO2 24 01/13/2020    BUN 11 01/13/2020    CREATININE 0.7 01/13/2020           Discharge Medications:    Monica Harmon   Home Medication Instructions JST:[id number]    Printed on:01/14/20 2590   Medication Information                      Calcium Carbonate Antacid (MAALOX) 600 MG CHEW  Take 1 tablet by mouth daily as needed              cefUROXime (CEFTIN) 250 MG tablet  Take 1 tablet by mouth 2 times daily for 4 days             diphenhydrAMINE (BENADRYL) 25 MG tablet  Take 25 mg by mouth nightly as needed. doxycycline hyclate (VIBRA-TABS) 100 MG tablet  Take 1 tablet by mouth 2 times daily for 4 days             furosemide (LASIX) 20 MG tablet  Take 1 tablet by mouth every other day             ipratropium (ATROVENT) 0.06 % nasal spray  SPRAY ONE TO TWO SPRAYS IN EACH NOSTRIL THREE TIMES A DAY AS NEEDED FOR RHINITIS             levothyroxine (SYNTHROID) 75 MCG tablet  TAKE 1 TABLET BY MOUTH  DAILY             losartan (COZAAR) 100 MG tablet  Take 1 tablet by mouth nightly             Multiple Vitamins-Minerals (THERAPEUTIC MULTIVITAMIN-MINERALS) tablet  Take 1 tablet by mouth daily. omeprazole 20 MG EC tablet  Take 20 mg by mouth daily             Probiotic Product (PROBIOTIC ADVANCED PO)  Take by mouth daily             rivaroxaban (XARELTO) 15 MG TABS tablet  TAKE ONE TABLET BY MOUTH DAILY WITH BREAKFAST             sotalol (BETAPACE) 80 MG tablet  TAKE ONE TABLET BY MOUTH TWICE A DAY             spironolactone (ALDACTONE) 25 MG tablet  Take 1 tablet by mouth daily             traMADol (ULTRAM) 50 MG tablet  Take 1 tablet by mouth every 6 hours as needed for Pain for up to 30 days. traZODone (DESYREL) 100 MG tablet  Take 1-2 tablets by mouth nightly as needed for Sleep                 No future appointments. Time Spent on discharge is more than 45 minutes in the examination, evaluation, counseling and review of medications and discharge plan.       Signed:  Raina RADFORD Oscar Dean MD   1/14/2020    The note was completed using EMR. Every effort was made to ensure accuracy; however, inadvertent computerized transcription errors may be present. Thank you Davonte Melo MD for the opportunity to be involved in this patient's care. If you have any questions or concerns please feel free to contact me at [phone].

## 2020-01-14 NOTE — CARE COORDINATION
Received dc order for today. Pt lives with son and daughter in law who assist 24/7.   Spoke with son Soham Toro who denies any dc needs, states he will be able to transport pt around 100 this afternoon,   Electronically signed by MG Gonsalez on 1/14/2020 at 11:12 AM

## 2020-01-14 NOTE — PLAN OF CARE
Problem: Falls - Risk of:  Goal: Will remain free from falls  Description  Will remain free from falls  1/14/2020 1117 by Boris Stone RN  Outcome: Ongoing  Pt will remain free from falls by using appropriate safety awareness/judgement and alert the RN/PCA when help is needed. Problem: Falls - Risk of:  Goal: Absence of physical injury  Description  Absence of physical injury  Outcome: Ongoing  Pt will be free from injury by using appropriate safety awareness and judgement. Pt will call for help when needed. RN will continue to do safety checks and ask if pt needs help to the bathroom in advance to prevent falls.            Problem: Discharge Planning:  Goal: Discharged to appropriate level of care  Description  Discharged to appropriate level of care  1/14/2020 1117 by Boris Stone RN  Outcome: Ongoing     Problem: Discharge Planning:  Goal: Participates in care planning  Description  Participates in care planning  Outcome: Ongoing     Problem: Airway Clearance - Ineffective:  Goal: Clear lung sounds  Description  Clear lung sounds  1/14/2020 1117 by Boris Stone RN  Outcome: Ongoing     Problem: Airway Clearance - Ineffective:  Goal: Ability to maintain a clear airway will improve  Description  Ability to maintain a clear airway will improve  1/14/2020 1117 by Boris Stone RN  Outcome: Ongoing

## 2020-01-14 NOTE — PROGRESS NOTES
Ambulatory with steady gait.  No new distress and no further questions or concerns.  Expressed understanding of discharge information and medications.     Clinical Pharmacy Note  Discharge Medication Counseling    Reviewed new medications to start after this hospital admission: Ceftin and Doxycycline. Indications and side effects were emphasized during counseling. All medication-related questions addressed. Patient verbalized understanding of education. Prescriptions sent to [facility]. Confirmed that this is her pharmacy. Should the patient express any additional questions or concerns regarding their medications, please do not hesitate to contact the pharmacy department. Patient/caregiver aware they may refuse medications during hospital stay. 15 minutes spent educating patient regarding medications.

## 2020-01-14 NOTE — PLAN OF CARE
Problem: Falls - Risk of:  Goal: Will remain free from falls  Description  Will remain free from falls  1/14/2020 0116 by Woody Guadalupe RN  Outcome: Ongoing  1/13/2020 1356 by Zeinab Alegria RN  Outcome: Ongoing   Patient uses call light appropriately to express needs. Bed to lowest position with door open and call light in reach. All fall precautions implemented at this time. Bed alarm on for safety. Siderails up x2. Non skid footwear in place. Patient has had no falls this shift. Will continue to monitor. Problem: Discharge Planning:  Goal: Discharged to appropriate level of care  Description  Discharged to appropriate level of care  1/14/2020 0116 by Woody Guadalupe RN  Outcome: Ongoing  1/13/2020 1356 by Zeinab Alegria RN  Outcome: Ongoing     Problem: Airway Clearance - Ineffective:  Goal: Clear lung sounds  Description  Clear lung sounds  1/14/2020 0116 by Woody Guadalupe RN  Outcome: Ongoing  1/13/2020 1356 by Zeinab Alegria RN  Outcome: Ongoing     Problem: Airway Clearance - Ineffective:  Goal: Ability to maintain a clear airway will improve  Description  Ability to maintain a clear airway will improve  1/14/2020 0116 by Woody Guadalupe RN  Outcome: Ongoing  1/13/2020 1356 by Zeinab Alegria RN  Outcome: Ongoing   Pt will maintain absence of respiratory complications. Oxygen saturations >90% at all times with supplemental O2 as needed. Will assess respiratory status every shift and PRN. Encourage to cough and deep breath. Encourage HHN as ordered. Medications as ordered.

## 2020-01-14 NOTE — DISCHARGE INSTR - COC
Continuity of Care Form    Patient Name: Lillian Martins   :  3/31/1927  MRN:  [id number]    Admit date:  2020  Discharge date:  ***    Code Status Order: Full Code   Advance Directives:   Advance Care Flowsheet Documentation     Date/Time Healthcare Directive Type of Healthcare Directive Copy in [address] Agent's Name Healthcare Agent's Phone Number    [phone]  No, patient does not have an advance directive for healthcare treatment -- -- -- -- --          Admitting Physician:  Zurdo Cordero MD  PCP: Fritz Cross MD    Discharging Nurse: [facility] Unit/Room#: H9A-5954/0636-47  Discharging Unit Phone Number: ***    Emergency Contact:   Extended Emergency Contact Information  Primary Emergency Contact: Yvette Schmid3 Phone: [phone]  Relation: Child  Secondary Emergency Contact: Luizsidianne [address] Phone: [phone]  Mobile Phone: [phone]  Relation: Other    Past Surgical History:  Past Surgical History:   Procedure Laterality Date   Kenneth Cardenas  ,7921    with cholangiogram    ERCP  2013    with Sphinchterotomy/Stone Removal    HYSTERECTOMY      PACEMAKER INSERTION      UPPER GASTROINTESTINAL ENDOSCOPY  10/21/2016    esophageal tear       Immunization History:   Immunization History   Administered Date(s) Administered    Influenza Vaccine, unspecified formulation 10/30/2008, 10/21/2010    Influenza, High Dose (Fluzone 65 yrs and older) 10/20/2011, 2012, 2013, 10/23/2014, 10/15/2015, 2016, 2017, 2018    Influenza, Triv, inactivated, subunit, adjuvanted, IM (Fluad 65 yrs and older) 2019    Pneumococcal Conjugate 13-valent ([id number]) 2016    Pneumococcal Polysaccharide (Vvwyfmagm70) 2018    Zoster Live (Zostavax) 2008       Active Problems:  Patient Active Problem List   Diagnosis Code    Atrial fibrillation ([facility] I48.91  Long term current use of anticoagulant therapy Z79.01    Hypothyroidism E03.9    Hypercholesteremia E78.00    Choledocholithiasis with obstruction K80.51    Hypertension I10    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    Arthritis M19.90    Allergic rhinitis J30.9    Cardiac pacemaker in situ Z95.0    DJD (degenerative joint disease) of cervical spine M47.812    DJD (degenerative joint disease) of thoracic spine M47.814    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    CHB (complete heart block) ([facility]) H01.6    Diastolic dysfunction, left ventricle I51.9    DJD (degenerative joint disease), lumbosacral M47.817    Drusen of right macula H35.361    Dry eye syndrome H04.129    Encounter for long-term (current) use of other medications Z79.899    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    Essential hypertension, benign I5    Female stress incontinence N39.3    Iron deficiency anemia D50.9    SSS (sick sinus syndrome) ([facility]) I49.5    Venous (peripheral) insufficiency I87.2    Sleep disorder G47.9    Phlebitis and thrombophlebitis of other deep vessels of lower extremities I80.299    Pernicious anemia D51.0    Other pulmonary embolism and infarction I26.99    Osteoarthritis of lumbar spine M47.816    Keratoconjunctivitis sicca of both eyes not specified as Sjogren's M32.547    Environmental and seasonal allergies J30.89    Renal insufficiency N28.9    CKD (chronic kidney disease), stage III ([facility]) N18.3    Essential hypertension I10    Acute metabolic encephalopathy I11.49    Generalized weakness R53.1    Pneumonia J18.9       Isolation/Infection:   Isolation          No Isolation        Patient Infection Status     None to display          Nurse Assessment:  Last Vital Signs: BP (!) 145/68   Pulse 71   Temp 97.7 °F (36.5 °C) (Oral)   Resp 18   Ht 5' 1\" (1.549 m)   Wt 126 lb 15.8 oz (57.6 kg)   LMP  (Exact Date)   SpO2 95%   BMI 23.99 kg/m²     Last documented pain score (0-10 scale): Pain Level: with patient):  {CHP DME Belongings:612264100}    RN SIGNATURE:  {Esignature:217399167}    CASE MANAGEMENT/SOCIAL WORK SECTION    Inpatient Status Date: ***    Readmission Risk Assessment Score:  Readmission Risk              Risk of Unplanned Readmission:        22           Discharging to Facility/ Agency   · Name:   · Address:  · Phone:  · Fax:    Dialysis Facility (if applicable)   · Name:  · Address:  · Dialysis Schedule:  · Phone:  · Fax:    / signature: {Esignature:097700631}    PHYSICIAN SECTION    Prognosis: {Prognosis:3446275421}    Condition at Discharge: [address] [place] [address] Patient Condition:289435847}    Rehab Potential (if transferring to Rehab): {Prognosis:0871981774}    Recommended Labs or Other Treatments After Discharge: ***    Physician Certification: I certify the above information and transfer of Michelle Wheeler  is necessary for the continuing treatment of the diagnosis listed and that she requires {Admit to Appropriate Level of Care:35134} for {GREATER/LESS:902466357} 30 days.      Update Admission H&P: {CHP DME Changes in [facility]5869[facility]}    PHYSICIAN SIGNATURE:  {Esignature:189693052}

## 2020-01-15 ENCOUNTER — TELEPHONE (OUTPATIENT)
Dept: PHARMACY | Facility: CLINIC | Age: 85
End: 2020-01-15

## 2020-01-15 NOTE — TELEPHONE ENCOUNTER
CLINICAL PHARMACY NOTE  Post-Discharge Transitions of Care (BIRDIE)    Routing to PharmD Candidate 2020 to outreach for transitions of care medication reconciliation as appropriate.     Thank you,  Doreen Stack, HolliD, [facility], [facility] Pharmacist    TCM Call Made?: Yes

## 2020-01-16 LAB
BLOOD CULTURE, ROUTINE: NORMAL
CULTURE, BLOOD 2: NORMAL

## 2020-01-16 NOTE — TELEPHONE ENCOUNTER
CLINICAL PHARMACY CONSULTATION: Transition of Care (BIRDIE)  -----------------------------------------------------------------------------------------------  Second attempt made to contact pt. Left msg on home/mobile TAD requesting call back at [phone] or [phone] option 7. Will send [employer] message to patient informing of calls and provide a phone number that can call back to discuss medications.     Sherry Marcus, PharmD Candidate ONU  O: [phone]  Toll free: [phone], option 7

## 2020-01-23 ENCOUNTER — OFFICE VISIT (OUTPATIENT)
Dept: FAMILY MEDICINE CLINIC | Age: 85
End: 2020-01-23
Payer: MEDICARE

## 2020-01-23 VITALS
HEIGHT: 61 IN | DIASTOLIC BLOOD PRESSURE: 70 MMHG | SYSTOLIC BLOOD PRESSURE: 120 MMHG | BODY MASS INDEX: 24.17 KG/M2 | HEART RATE: 74 BPM | RESPIRATION RATE: 16 BRPM | OXYGEN SATURATION: 90 % | WEIGHT: 128 LBS

## 2020-01-23 PROCEDURE — G8420 CALC BMI NORM PARAMETERS: HCPCS | Performed by: FAMILY MEDICINE

## 2020-01-23 PROCEDURE — 1090F PRES/ABSN URINE INCON ASSESS: CPT | Performed by: FAMILY MEDICINE

## 2020-01-23 PROCEDURE — 1036F TOBACCO NON-USER: CPT | Performed by: FAMILY MEDICINE

## 2020-01-23 PROCEDURE — 99214 OFFICE O/P EST MOD 30 MIN: CPT | Performed by: FAMILY MEDICINE

## 2020-01-23 PROCEDURE — 1111F DSCHRG MED/CURRENT MED MERGE: CPT | Performed by: FAMILY MEDICINE

## 2020-01-23 PROCEDURE — 4040F PNEUMOC VAC/ADMIN/RCVD: CPT | Performed by: FAMILY MEDICINE

## 2020-01-23 PROCEDURE — 4130F TOPICAL PREP RX AOE: CPT | Performed by: FAMILY MEDICINE

## 2020-01-23 PROCEDURE — G8482 FLU IMMUNIZE ORDER/ADMIN: HCPCS | Performed by: FAMILY MEDICINE

## 2020-01-23 PROCEDURE — G8427 DOCREV CUR MEDS BY ELIG CLIN: HCPCS | Performed by: FAMILY MEDICINE

## 2020-01-23 PROCEDURE — 1123F ACP DISCUSS/DSCN MKR DOCD: CPT | Performed by: FAMILY MEDICINE

## 2020-01-23 ASSESSMENT — PATIENT HEALTH QUESTIONNAIRE - PHQ9
2. FEELING DOWN, DEPRESSED OR HOPELESS: 0
1. LITTLE INTEREST OR PLEASURE IN DOING THINGS: 0
SUM OF ALL RESPONSES TO PHQ9 QUESTIONS 1 & 2: 0
SUM OF ALL RESPONSES TO PHQ QUESTIONS 1-9: 0
SUM OF ALL RESPONSES TO PHQ QUESTIONS 1-9: 0

## 2020-01-23 NOTE — PATIENT INSTRUCTIONS
gently rest against your index (pointing) finger. 5. Unwind your fingers slightly so that your fingertips can touch the base of your palm. Your thumb can rest against your index finger. 6. Move back to your starting position, with your fingers and thumb pointing up. 7. Repeat the series of motions 8 to 12 times. 8. Switch hands and repeat steps 1 through 6, even if only one hand is sore. Intrinsic flexion   1. Rest your affected hand on a table and bend the large joints where your fingers connect to your hand. Keep your thumb and the other joints in your fingers straight. 2. Slowly straighten your fingers. Your wrist should be relaxed, following the line of your fingers and thumb. 3. Move back to your starting position, with your hand bent. 4. Repeat 8 to 12 times. 5. Switch hands and repeat steps 1 through 4, even if only one hand is sore. Finger extension   1. Place your affected hand flat on a table. 2. Lift and then lower one finger at a time off the table. 3. Repeat 8 to 12 times. 4. Switch hands and repeat steps 1 through 3, even if only one hand is sore. MP extension   1. Place your good hand on a table, palm up. Put your affected hand on top of your good hand with your fingers wrapped around the thumb of your good hand like you are making a fist.  2. Slowly uncurl the joints of your affected hand where your fingers connect to your hand so that only the top two joints of your fingers are bent. Your fingers will look like a hook. 3. Move back to your starting position, with your fingers wrapped around your good thumb. 4. Repeat 8 to 12 times. 5. Switch hands and repeat steps 1 through 4, even if only one hand is sore. PIP extension (with MP extension)   1. Place your good hand on a table, palm up. Put your affected hand on top of your good hand, palm up. 2. Use the thumb and fingers of your good hand to grasp below the middle joint of one finger of your affected hand.   3. Straighten health, and be sure to contact your doctor if:    · You do not get better after 2 weeks.     · You have any new symptoms, such as itching or a feeling of fullness in the ear. Where can you learn more? Go to [email or web address]://chpejeneb.iPierian. org and sign in to your Grasshoppers! account. Enter Y822 in the Reverbeo box to learn more about \"Eustachian Tube Problems: Care Instructions. \"     If you do not have an account, please click on the \"Sign Up Now\" link. Current as of: July 28, 2019  Content Version: 12.3  © 0373-0927 Healthwise, Incorporated. Care instructions adapted under license by [facility] ([facility]). If you have questions about a medical condition or this instruction, always ask your healthcare professional. [address] any warranty or liability for your use of this information.

## 2020-01-23 NOTE — PROGRESS NOTES
Subjective:      Patient ID: Jd Roth is a 80 y.o. female. Chief Complaint   Patient presents with    Other     PT WAS AT THE AUDIOLOGIST TODAY AND THEY WERE UNABLE TO PERFORM A HEARING TEST ON HE DUE TO WAX BUILDUP AND INFLAMMTION IN HER EARS, NEEDING TO GET IT LOOKED AT, PT DENIES ANY PAIN RIGHT NOW   108  HPI  EAR WAX BILATERAL R>L  As above. They were concerned about inflammation in canal and so couldn't rinse. She is having no ear pain. No recent ear drops. No Q tips used ever. No recent cold / URI Sx. Can't hear from both ears. No drainage from the ears. Right hand pain and stiffness  Has OA all over. No injury to wrist.  Not sure why her wrist is swollen. Has ulnar deviation of left hand. Current Outpatient Medications   Medication Sig Dispense Refill    sotalol (BETAPACE) 80 MG tablet TAKE ONE TABLET BY MOUTH TWICE A DAY 30 tablet 5    losartan (COZAAR) 100 MG tablet Take 1 tablet by mouth nightly 30 tablet 5    traZODone (DESYREL) 100 MG tablet Take 1-2 tablets by mouth nightly as needed for Sleep 60 tablet 5    traMADol (ULTRAM) 50 MG tablet Take 1 tablet by mouth every 6 hours as needed for Pain for up to 30 days. 120 tablet 2    levothyroxine (SYNTHROID) 75 MCG tablet TAKE 1 TABLET BY MOUTH  DAILY 90 tablet 3    rivaroxaban (XARELTO) 15 MG TABS tablet TAKE ONE TABLET BY MOUTH DAILY WITH BREAKFAST 15 tablet 0    ipratropium (ATROVENT) 0.06 % nasal spray SPRAY ONE TO TWO SPRAYS IN EACH NOSTRIL THREE TIMES A DAY AS NEEDED FOR RHINITIS 1 Bottle 5    spironolactone (ALDACTONE) 25 MG tablet Take 1 tablet by mouth daily 90 tablet 3    furosemide (LASIX) 20 MG tablet Take 1 tablet by mouth every other day 30 tablet 3    Probiotic Product (PROBIOTIC ADVANCED PO) Take by mouth daily      omeprazole 20 MG EC tablet Take 20 mg by mouth daily      Multiple Vitamins-Minerals (THERAPEUTIC MULTIVITAMIN-MINERALS) tablet Take 1 tablet by mouth daily.       Calcium Carbonate Antacid arthritis in the past:  Glucosamine 1500 mg/ chondroitin 1200 mg once daily or any combination equaling that dose i.e. 750/600 mg twice daily or 500/400 mg three time daily. This is a daily joint/arthritis supplement without significant side effects. Patient Education   Hand Arthritis: Exercises  Demo done of some simple exercises. Patient Education   Eustachian Tube Problems: Care Instructions  Your Care Instructions  Anatomy explained. Problem occurs with postnasal drip from colds allergies or sinus infections running across the eustachian tube opening in the back of the throat causing it to swell close.     Juan Malhotra, DO

## 2020-02-10 ENCOUNTER — TELEPHONE (OUTPATIENT)
Dept: FAMILY MEDICINE CLINIC | Age: 85
End: 2020-02-10

## 2020-02-10 NOTE — TELEPHONE ENCOUNTER
This is not on her most current med list from her most recent visit. This would be up to Dr. Cornelius Harmon. He will be in tomorrow.

## 2020-02-13 ENCOUNTER — TELEPHONE (OUTPATIENT)
Dept: FAMILY MEDICINE CLINIC | Age: 85
End: 2020-02-13

## 2020-02-13 NOTE — TELEPHONE ENCOUNTER
Pt son in law wants to know if she should be  Taking amlodipine and what the dosage is? Is she is suppose to be on this she needs a refill.     Send to [facility]

## 2020-02-14 ENCOUNTER — NURSE ONLY (OUTPATIENT)
Dept: FAMILY MEDICINE CLINIC | Age: 85
End: 2020-02-14
Payer: MEDICARE

## 2020-02-14 LAB
BILIRUBIN, POC: NEGATIVE
BLOOD URINE, POC: NEGATIVE
CLARITY, POC: ABNORMAL
COLOR, POC: ABNORMAL
GLUCOSE URINE, POC: NEGATIVE
KETONES, POC: ABNORMAL
LEUKOCYTE EST, POC: ABNORMAL
NITRITE, POC: NEGATIVE
PH, POC: 6.5
PROTEIN, POC: NEGATIVE
SPECIFIC GRAVITY, POC: 1.02
UROBILINOGEN, POC: 0.2

## 2020-02-14 PROCEDURE — 81002 URINALYSIS NONAUTO W/O SCOPE: CPT | Performed by: FAMILY MEDICINE

## 2020-02-14 NOTE — TELEPHONE ENCOUNTER
Solis Alves informed of UA results. Per Solis Alves pt is NOT taking Norvasc or Amlodipine. Pt does NOT check bp at home.   Informed Solis Alves of HANY's note and she states understanding./mv

## 2020-02-14 NOTE — TELEPHONE ENCOUNTER
Anaya Hurt informed of UA results.     Per Anaya Hurt pt is NOT taking Norvasc or Amlodipine. Pt does NOT check bp at home.   Informed Anaya Hurt of HANY's note and she states understanding./mv

## 2020-02-14 NOTE — TELEPHONE ENCOUNTER
2 VM LEFT FOR PT AND SON NEED TO KNOW IF SHE IS STILL TAKING MED AND GIVE UA RESULTS. Charleen Gregorio

## 2020-02-15 LAB — URINE CULTURE, ROUTINE: NORMAL

## 2020-02-18 ENCOUNTER — TELEPHONE (OUTPATIENT)
Dept: FAMILY MEDICINE CLINIC | Age: 85
End: 2020-02-18

## 2020-02-18 NOTE — TELEPHONE ENCOUNTER
2/15/2020  9:52 AM - Lindy Norton Incoming Lab Results From Soft (Epic Adt)     Specimen Information: Urine, clean catch        Component Collected Lab   Urine Culture, Routine 02/14/2020  8:00 AM  - [facility]   <10,000 CFU/ml mixed skin/urogenital elo. No further workup    Testing Performed By     Lab - Abbreviation Name Director Address Valid Date Range   19- - Foster Chávez M.D., Ph.D. [address] [place] [id number] 08/30/17 0817-Present   Narrative   Performed by: [facility]   ORDER#: [id number]                          ORDERED BY: Deirdre Escobedo  SOURCE: Urine Clean Catch                  COLLECTED:  02/14/20 08:00  [facility] AT [facility]. :                      RECEIVED :  02/14/20 13:44  Performed at:  [facility]  [address] [facility]   Phone [phone]

## 2020-04-01 RX ORDER — SOTALOL HYDROCHLORIDE 80 MG/1
TABLET ORAL
Qty: 60 TABLET | Refills: 2 | Status: SHIPPED | OUTPATIENT
Start: 2020-04-01 | End: 2020-06-23 | Stop reason: SDUPTHER

## 2020-04-06 RX ORDER — SPIRONOLACTONE 25 MG/1
25 TABLET ORAL DAILY
Qty: 90 TABLET | Refills: 0 | Status: SHIPPED | OUTPATIENT
Start: 2020-04-06 | End: 2020-06-08

## 2020-04-27 RX ORDER — LISINOPRIL 20 MG/1
20 TABLET ORAL DAILY
Qty: 90 TABLET | Refills: 3 | OUTPATIENT
Start: 2020-04-27

## 2020-04-27 RX ORDER — AMLODIPINE BESYLATE 5 MG/1
TABLET ORAL
Qty: 90 TABLET | Refills: 1 | OUTPATIENT
Start: 2020-04-27

## 2020-04-29 ENCOUNTER — TELEPHONE (OUTPATIENT)
Dept: FAMILY MEDICINE CLINIC | Age: 85
End: 2020-04-29

## 2020-04-29 NOTE — TELEPHONE ENCOUNTER
Please give Patti Ch - patient son in law, a call  and let him know why we refused to refill Amlodipine.

## 2020-04-30 ENCOUNTER — TELEPHONE (OUTPATIENT)
Dept: FAMILY MEDICINE CLINIC | Age: 85
End: 2020-04-30

## 2020-04-30 NOTE — TELEPHONE ENCOUNTER
I spoke with son-in-law and advised that per phone encounter in February patient is no longer taking the lisinopril or amlodipine. Son-in-law states he wasn't sure because he got automatic refill notification from pharmacy. I advised him to review all meds with patient to make sure we have her list correct.  I also advised to check patient B/P a few times a week to make sure it is not elevated

## 2020-04-30 NOTE — TELEPHONE ENCOUNTER
KRISTA'S SON-IN -LAW CALLING BACK WITH BP READING     BP - 89/42 - 11:30 TODAY  BP - 99/48 -  1/2 HR AGO    SHE IS TAKING AMLODOPINE AND SHE ISN'T SUPPOSE TO BE     SHE IS NOT SUPPOSE TO BE TAKEN LOSARTAN EITHER - BUT SHE IS     LULA @ [phone]  - HE WAS SUPPOSE TO LET DAVE KNOW  - [address] [place] [address] Drive

## 2020-05-05 ENCOUNTER — TELEPHONE (OUTPATIENT)
Dept: FAMILY MEDICINE CLINIC | Age: 85
End: 2020-05-05

## 2020-05-05 NOTE — TELEPHONE ENCOUNTER
Patient son in law is calling with a update on her BP:    BP today 134/67    Changes in her medication seems to be working.      [address] [facility]

## 2020-06-08 RX ORDER — SPIRONOLACTONE 25 MG/1
25 TABLET ORAL DAILY
Qty: 90 TABLET | Refills: 0 | Status: SHIPPED | OUTPATIENT
Start: 2020-06-08 | End: 2020-06-23 | Stop reason: SDUPTHER

## 2020-06-23 ENCOUNTER — VIRTUAL VISIT (OUTPATIENT)
Dept: FAMILY MEDICINE CLINIC | Age: 85
End: 2020-06-23
Payer: MEDICARE

## 2020-06-23 PROCEDURE — 1123F ACP DISCUSS/DSCN MKR DOCD: CPT | Performed by: FAMILY MEDICINE

## 2020-06-23 PROCEDURE — 1090F PRES/ABSN URINE INCON ASSESS: CPT | Performed by: FAMILY MEDICINE

## 2020-06-23 PROCEDURE — 99214 OFFICE O/P EST MOD 30 MIN: CPT | Performed by: FAMILY MEDICINE

## 2020-06-23 PROCEDURE — 4040F PNEUMOC VAC/ADMIN/RCVD: CPT | Performed by: FAMILY MEDICINE

## 2020-06-23 PROCEDURE — G8427 DOCREV CUR MEDS BY ELIG CLIN: HCPCS | Performed by: FAMILY MEDICINE

## 2020-06-23 RX ORDER — FUROSEMIDE 40 MG/1
40 TABLET ORAL DAILY PRN
Qty: 90 TABLET | Refills: 1 | Status: SHIPPED | OUTPATIENT
Start: 2020-06-23 | End: 2020-10-26

## 2020-06-23 RX ORDER — SPIRONOLACTONE 25 MG/1
25 TABLET ORAL DAILY
Qty: 90 TABLET | Refills: 3 | Status: SHIPPED | OUTPATIENT
Start: 2020-06-23 | End: 2021-08-23

## 2020-06-23 RX ORDER — TRAMADOL HYDROCHLORIDE 50 MG/1
50 TABLET ORAL EVERY 6 HOURS PRN
Qty: 120 TABLET | Refills: 2 | Status: SHIPPED | OUTPATIENT
Start: 2020-06-23 | End: 2020-11-10 | Stop reason: SDUPTHER

## 2020-06-23 RX ORDER — SOTALOL HYDROCHLORIDE 80 MG/1
TABLET ORAL
Qty: 180 TABLET | Refills: 3 | Status: SHIPPED | OUTPATIENT
Start: 2020-06-23 | End: 2021-08-09

## 2020-06-23 NOTE — PROGRESS NOTES
SABA Palacios - CNP   ipratropium (ATROVENT) 0.06 % nasal spray SPRAY ONE TO TWO SPRAYS IN EACH NOSTRIL THREE TIMES A DAY AS NEEDED FOR RHINITIS  Conor Lei MD   furosemide (LASIX) 20 MG tablet Take 1 tablet by mouth every other day  Ga Nugent MD   Probiotic Product (PROBIOTIC ADVANCED PO) Take by mouth daily  Historical Provider, MD   omeprazole 20 MG EC tablet Take 20 mg by mouth daily  Historical Provider, MD   Multiple Vitamins-Minerals (THERAPEUTIC MULTIVITAMIN-MINERALS) tablet Take 1 tablet by mouth daily. Historical Provider, MD   Calcium Carbonate Antacid (MAALOX) 600 MG CHEW Take 1 tablet by mouth daily as needed   Historical Provider, MD   diphenhydrAMINE (BENADRYL) 25 MG tablet Take 25 mg by mouth nightly as needed. Historical Provider, MD       Social History     Tobacco Use    Smoking status: Former Smoker     Last attempt to quit: 6/15/1965     Years since quittin.0    Smokeless tobacco: Never Used   Substance Use Topics    Alcohol use: No    Drug use: No        PHYSICAL EXAMINATION:  [ INSTRUCTIONS:  \"[x]\" Indicates a positive item  \"[]\" Indicates a negative item  -- DELETE ALL ITEMS NOT EXAMINED]  Vital Signs: (As obtained by patient/caregiver or practitioner observation)    Blood pressure-  Heart rate-    Respiratory rate-    Temperature-  Pulse oximetry-     Constitutional: [x] Appears well-developed and well-nourished [x] No apparent distress      [] Abnormal-   Mental status  [x] Alert and awake  [x] Oriented to person/place/time [x]Able to follow commands      Eyes:  EOM    [x]  Normal  [] Abnormal-  Sclera  [x]  Normal  [] Abnormal -         Discharge []  None visible  [] Abnormal -    HENT:   [x] Normocephalic, atraumatic.   [] Abnormal   [] Mouth/Throat: Mucous membranes are moist.     External Ears [] Normal  [] Abnormal-     Neck: [] No visualized mass     Pulmonary/Chest: [x] Respiratory effort normal.  [x] No visualized signs of difficulty breathing or respiratory distress        [] Abnormal-      Musculoskeletal:   [] Normal gait with no signs of ataxia         [] Normal range of motion of neck        [] Abnormal-       Neurological:        [x] No Facial Asymmetry (Cranial nerve 7 motor function) (limited exam to video visit)          [] No gaze palsy        [] Abnormal-         Skin:        [x] No significant exanthematous lesions or discoloration noted on facial skin         [] Abnormal-            Psychiatric:       [x] Normal Affect [] No Hallucinations        [] Abnormal-     Other pertinent observable physical exam findings-     ASSESSMENT/PLAN:  There are no diagnoses linked to this encounter. Diagnosis Orders   1. Abnormal weight gain     2. Arthritis  traMADol (ULTRAM) 50 MG tablet   3. CKD (chronic kidney disease), stage III (HCC)  Comprehensive Metabolic Panel   4. Atrial fibrillation, unspecified type ([facility]     5. Essential hypertension  Comprehensive Metabolic Panel   6. Acquired hypothyroidism  T4, Free    TSH without Reflex     F/u pending labs - may need to add add'l potassium pending labs  Double up to 40mg lasix daily x5 days then resume 3x/ week w/ monitoring of weight - breathing seems to be okay presently w/ limited swelling in distal low extremities  Check thyroid function on present thyroid dose  Refill afib meds - seems stable overall  Tramadol/ apap for oa/ chronic back pain  F/u pending labs o/w approx 3 months  oarrs reviewed and results c/w rx'd meds  No concerns for abuse  Tramadol last filled 4/28 for 120 -uses every 30-70 days  No follow-ups on file. Cheryle Blend is a 80 y.o. female being evaluated by a Virtual Visit (video visit) encounter to address concerns as mentioned above. A caregiver was present when appropriate.  Due to this being a TeleHealth encounter (During ZKWBC-53 public health emergency), evaluation of the following organ systems was limited:

## 2020-07-27 RX ORDER — LOSARTAN POTASSIUM 100 MG/1
TABLET ORAL
Qty: 90 TABLET | Refills: 4 | Status: SHIPPED | OUTPATIENT
Start: 2020-07-27 | End: 2021-11-04

## 2020-08-10 RX ORDER — IPRATROPIUM BROMIDE 42 UG/1
SPRAY, METERED NASAL
Qty: 1 BOTTLE | Refills: 2 | Status: SHIPPED | OUTPATIENT
Start: 2020-08-10 | End: 2021-10-11

## 2020-08-19 ENCOUNTER — TELEPHONE (OUTPATIENT)
Dept: FAMILY MEDICINE CLINIC | Age: 85
End: 2020-08-19

## 2020-08-19 NOTE — TELEPHONE ENCOUNTER
Patient is experiencing left upper arm swelling with some tenderness. Goes from elbow to shoulder. Wanted to make an appointment.  Nothing in office on Sept 8

## 2020-08-20 ENCOUNTER — OFFICE VISIT (OUTPATIENT)
Dept: FAMILY MEDICINE CLINIC | Age: 85
End: 2020-08-20
Payer: MEDICARE

## 2020-08-20 VITALS
HEART RATE: 70 BPM | HEIGHT: 61 IN | OXYGEN SATURATION: 96 % | TEMPERATURE: 99 F | DIASTOLIC BLOOD PRESSURE: 76 MMHG | SYSTOLIC BLOOD PRESSURE: 122 MMHG | BODY MASS INDEX: 24.19 KG/M2

## 2020-08-20 PROCEDURE — 99213 OFFICE O/P EST LOW 20 MIN: CPT | Performed by: NURSE PRACTITIONER

## 2020-08-20 NOTE — PROGRESS NOTES
Take 1 tablet by mouth daily. Yes Historical Provider, MD   Calcium Carbonate Antacid (MAALOX) 600 MG CHEW Take 1 tablet by mouth daily as needed   Historical Provider, MD   diphenhydrAMINE (BENADRYL) 25 MG tablet Take 25 mg by mouth nightly as needed. Historical Provider, MD        Social History     Tobacco Use    Smoking status: Former Smoker     Last attempt to quit: 6/15/1965     Years since quittin.2    Smokeless tobacco: Never Used   Substance Use Topics    Alcohol use: No        Vitals:    20 1400   BP: 122/76   Site: Left Upper Arm   Position: Sitting   Cuff Size: Medium Adult   Pulse: 70   Temp: 99 °F (37.2 °C)   TempSrc: Infrared   SpO2: 96%   Height: 5' 1\" (1.549 m)     Estimated body mass index is 24.19 kg/m² as calculated from the following:    Height as of this encounter: 5' 1\" (1.549 m). Weight as of 20: 128 lb (58.1 kg). Physical Exam  Constitutional:       Appearance: Normal appearance. She is normal weight. Musculoskeletal: Normal range of motion. General: No tenderness or signs of injury. Skin:         Neurological:      Mental Status: She is alert. ASSESSMENT/PLAN:  1. Bug bite, initial encounter  - Atopic derm vs bug bite. Low index of suspicion for cellulitis given lack of scaly appearance and drainage. 2. Atopic dermatitis, unspecified type  - triamcinolone (KENALOG) 0.1 % ointment; Apply topically 2 times daily for 7 days  Dispense: 1 Tube; Refill: 0      Return if symptoms worsen or fail to improve. An electronic signature was used to authenticate this note.     --SABA Nelson - CNP on 2020 at 2:30 PM

## 2020-09-21 RX ORDER — LEVOTHYROXINE SODIUM 0.07 MG/1
TABLET ORAL
Qty: 90 TABLET | Refills: 3 | Status: SHIPPED | OUTPATIENT
Start: 2020-09-21 | End: 2021-09-23 | Stop reason: SDUPTHER

## 2020-10-26 RX ORDER — FUROSEMIDE 40 MG/1
TABLET ORAL
Qty: 90 TABLET | Refills: 1 | Status: SHIPPED | OUTPATIENT
Start: 2020-10-26 | End: 2021-11-15 | Stop reason: SDUPTHER

## 2020-11-10 ENCOUNTER — VIRTUAL VISIT (OUTPATIENT)
Dept: FAMILY MEDICINE CLINIC | Age: 85
End: 2020-11-10
Payer: MEDICARE

## 2020-11-10 PROCEDURE — 99214 OFFICE O/P EST MOD 30 MIN: CPT | Performed by: FAMILY MEDICINE

## 2020-11-10 RX ORDER — TIZANIDINE 4 MG/1
TABLET ORAL
Qty: 45 TABLET | Refills: 1 | Status: SHIPPED | OUTPATIENT
Start: 2020-11-10

## 2020-11-10 RX ORDER — TRAMADOL HYDROCHLORIDE 50 MG/1
50-100 TABLET ORAL EVERY 6 HOURS PRN
Qty: 240 TABLET | Refills: 2 | Status: SHIPPED | OUTPATIENT
Start: 2020-11-10 | End: 2022-05-20

## 2020-11-10 RX ORDER — PREDNISONE 10 MG/1
TABLET ORAL
Qty: 25 TABLET | Refills: 0 | Status: SHIPPED | OUTPATIENT
Start: 2020-11-10 | End: 2021-01-27

## 2020-11-10 NOTE — PROGRESS NOTES
11/10/2020    TELEHEALTH EVALUATION -- Audio/Visual (During HVIZN-92 public health emergency)    HPI:    Shailesh Tabor (:  3/31/1927) has requested an audio/video evaluation for the following concern(s):  Chief Complaint   Patient presents with    Elbow Pain     PT C/O LEFT ELBOW PAIN/SWELLING X1 MONTH, NO NUMBNESS/TINGLING IN FINGERS. SOME INCREASED WEAKNESS IN LEFT HAND. NO FALLS. PT SAW FRANCESCA NDIAYE CNP IN 2020 AND WAS DIAGNOSED WITH POSSIBLE BUG BITE BUT NEVER IMPROVED.      on xarelto chronically for afib - switched from coumadin  Painful at times - swelling since august  Some bruising at onset - random - nki  Thought maybe spider bite but not gotten any better  Tried heat which helps - worn elbow brace - which helps - using tramadol and tiger balm - discomfort never goes away completely - can't extend fully - some limitation w/ flexion  Some tight muscles in neck as well. A little warm to touch. Memory seems to be getting worse. Not sleeping well consistently - may be up all night. Wants to hold on add'l meds frequently  ? About tramadol as not as effective as in past - still uses 1 every 6 hours prn - some days may not take but don't help as much as in past o/w tolerates well  oarrs reviewed and results c/w rx'd meds    Review of Systems    Prior to Visit Medications    Medication Sig Taking?  Authorizing Provider   furosemide (LASIX) 40 MG tablet TAKE 1 TABLET BY MOUTH  DAILY AS NEEDED FOR  SWELLING/ WEIGHT GAIN  Sasha Montague MD   levothyroxine (SYNTHROID) 75 MCG tablet TAKE 1 TABLET BY MOUTH  DAILY  SABA Ross CNP   ipratropium (ATROVENT) 0.06 % nasal spray SPRAY ONE TO TWO SPRAYS IN EACH NOSTRIL THREE TIMES A DAY AS NEEDED FOR RHINITIS  SABA Ross CNP   losartan (COZAAR) 100 MG tablet TAKE ONE TABLET BY MOUTH ONCE NIGHTLY  Patient taking differently: 50 mg   SABA Ross CNP   traMADol (ULTRAM) 50 MG tablet Take 1 tablet by mouth every 6 hours as normal.  [] No visualized signs of difficulty breathing or respiratory distress        [] Abnormal-      Musculoskeletal:   [] Normal gait with no signs of ataxia         [] Normal range of motion of neck        [x] Abnormal- sl decrease rom left elbow w/ swelling distal upper arm to elbow    Neurological:        [x] No Facial Asymmetry (Cranial nerve 7 motor function) (limited exam to video visit)          [] No gaze palsy        [] Abnormal-         Skin:        [x] No significant exanthematous lesions or discoloration noted on facial skin         [] Abnormal-            Psychiatric:       [x] Normal Affect [] No Hallucinations        [] Abnormal-     Other pertinent observable physical exam findings-     ASSESSMENT/PLAN:  There are no diagnoses linked to this encounter. Diagnosis Orders   1. Mild anemia  CBC Auto Differential   2. Hyponatremia  Comprehensive Metabolic Panel   3. Left arm swelling  predniSONE (DELTASONE) 10 MG tablet    XR ELBOW LEFT (2 VIEWS)    Sedimentation Rate    Rheumatoid Factor    Uric Acid   4. Arthritis  traMADol (ULTRAM) 50 MG tablet    Sedimentation Rate    Rheumatoid Factor    Uric Acid   5. Memory changes    Check labs/ xray of elbow  Consider ortho pending response to treatment and test results  Low likelihood of infection - suspect inflammatory arthritis  Check xray, rf, uric acid, cbc, cmp, esr  Prednisone 40 to 5mg taper se dw/ pt  zanaflex 4mg hs w/ 2mg during day prn  Increase ultram for now to 100mg q6 h prn  zanaflex may help w/ sleep as problem lately  Memory loss d/w pt and daughter/ nannette - mental/ physical exercise and good nutrition/ sleep -   [place] on f/u visit - tsh/ b12 okay in recent past  No follow-ups on file. Quang Anaya is a 80 y.o. female being evaluated by a Virtual Visit (video visit) encounter to address concerns as mentioned above. A caregiver was present when appropriate.  Due to this being a TeleHealth encounter (During PPLOT-90 public health emergency), evaluation of the following organ systems was limited: Vitals/Constitutional/EENT/Resp/CV/GI//MS/Neuro/Skin/Heme-Lymph-Imm. Pursuant to the emergency declaration under the [address] [place] [address], [address] [place] [address] authority and the [employer] Act, this Virtual Visit was conducted with patient's (and/or legal guardian's) consent, to reduce the patient's risk of exposure to COVID-19 and provide necessary medical care. The patient (and/or legal guardian) has also been advised to contact this office for worsening conditions or problems, and seek emergency medical treatment and/or call 911 if deemed necessary. Patient identification was verified at the start of the visit: Yes    Total time spent on this encounter: 21-30 minutes      Services were provided through a video synchronous discussion virtually to substitute for in-person clinic visit. Patient and provider were located at their individual homes. --Giovany Shay MD on 11/10/2020 at 3:32 PM    An electronic signature was used to authenticate this note.

## 2020-11-12 ENCOUNTER — HOSPITAL ENCOUNTER (OUTPATIENT)
Age: 85
Discharge: HOME | End: 2020-11-12
Payer: MEDICARE

## 2020-11-12 ENCOUNTER — HOSPITAL ENCOUNTER (OUTPATIENT)
Dept: GENERAL RADIOLOGY | Age: 85
Discharge: HOME | End: 2020-11-12
Payer: MEDICARE

## 2020-11-12 LAB
A/G RATIO: 1.1 (ref 1.1–2.2)
ALBUMIN SERPL-MCNC: 4 G/DL (ref 3.4–5)
ALP BLD-CCNC: 118 U/L (ref 40–129)
ALT SERPL-CCNC: 12 U/L (ref 10–40)
ANION GAP SERPL CALCULATED.3IONS-SCNC: 12 MMOL/L (ref 3–16)
AST SERPL-CCNC: 24 U/L (ref 15–37)
BASOPHILS ABSOLUTE: 0 K/UL (ref 0–0.2)
BASOPHILS RELATIVE PERCENT: 0.3 %
BILIRUB SERPL-MCNC: 0.7 MG/DL (ref 0–1)
BUN BLDV-MCNC: 22 MG/DL (ref 7–20)
CALCIUM SERPL-MCNC: 10.1 MG/DL (ref 8.3–10.6)
CHLORIDE BLD-SCNC: 99 MMOL/L (ref 99–110)
CO2: 29 MMOL/L (ref 21–32)
CREAT SERPL-MCNC: 1.1 MG/DL (ref 0.6–1.2)
EOSINOPHILS ABSOLUTE: 0 K/UL (ref 0–0.6)
EOSINOPHILS RELATIVE PERCENT: 0.4 %
GFR AFRICAN AMERICAN: 56
GFR NON-AFRICAN AMERICAN: 46
GLOBULIN: 3.6 G/DL
GLUCOSE BLD-MCNC: 113 MG/DL (ref 70–99)
HCT VFR BLD CALC: 44 % (ref 36–48)
HEMOGLOBIN: 14.8 G/DL (ref 12–16)
LYMPHOCYTES ABSOLUTE: 1.3 K/UL (ref 1–5.1)
LYMPHOCYTES RELATIVE PERCENT: 14.1 %
MCH RBC QN AUTO: 30 PG (ref 26–34)
MCHC RBC AUTO-ENTMCNC: 33.5 G/DL (ref 31–36)
MCV RBC AUTO: 89.5 FL (ref 80–100)
MONOCYTES ABSOLUTE: 0.8 K/UL (ref 0–1.3)
MONOCYTES RELATIVE PERCENT: 8.1 %
NEUTROPHILS ABSOLUTE: 7.3 K/UL (ref 1.7–7.7)
NEUTROPHILS RELATIVE PERCENT: 77.1 %
PDW BLD-RTO: 14.1 % (ref 12.4–15.4)
PLATELET # BLD: 317 K/UL (ref 135–450)
PMV BLD AUTO: 6.8 FL (ref 5–10.5)
POTASSIUM SERPL-SCNC: 3.9 MMOL/L (ref 3.5–5.1)
RBC # BLD: 4.92 M/UL (ref 4–5.2)
RHEUMATOID FACTOR: 30 IU/ML
SEDIMENTATION RATE, ERYTHROCYTE: 21 MM/HR (ref 0–30)
SODIUM BLD-SCNC: 140 MMOL/L (ref 136–145)
TOTAL PROTEIN: 7.6 G/DL (ref 6.4–8.2)
URIC ACID, SERUM: 5.2 MG/DL (ref 2.6–6)
WBC # BLD: 9.5 K/UL (ref 4–11)

## 2020-11-12 PROCEDURE — 86431 RHEUMATOID FACTOR QUANT: CPT

## 2020-11-12 PROCEDURE — 80053 COMPREHEN METABOLIC PANEL: CPT

## 2020-11-12 PROCEDURE — 85652 RBC SED RATE AUTOMATED: CPT

## 2020-11-12 PROCEDURE — 73080 X-RAY EXAM OF ELBOW: CPT

## 2020-11-12 PROCEDURE — 85025 COMPLETE CBC W/AUTO DIFF WBC: CPT

## 2020-11-12 PROCEDURE — 84550 ASSAY OF BLOOD/URIC ACID: CPT

## 2020-11-12 PROCEDURE — 36415 COLL VENOUS BLD VENIPUNCTURE: CPT

## 2020-12-03 ENCOUNTER — IMMUNIZATION (OUTPATIENT)
Dept: FAMILY MEDICINE CLINIC | Age: 85
End: 2020-12-03
Payer: MEDICARE

## 2020-12-03 PROCEDURE — G0008 ADMIN INFLUENZA VIRUS VAC: HCPCS | Performed by: FAMILY MEDICINE

## 2020-12-03 PROCEDURE — 90653 IIV ADJUVANT VACCINE IM: CPT | Performed by: FAMILY MEDICINE

## 2021-01-27 ENCOUNTER — OFFICE VISIT (OUTPATIENT)
Dept: FAMILY MEDICINE CLINIC | Age: 86
End: 2021-01-27
Payer: MEDICARE

## 2021-01-27 VITALS — BODY MASS INDEX: 24.19 KG/M2 | HEIGHT: 61 IN | TEMPERATURE: 97 F

## 2021-01-27 DIAGNOSIS — M79.89 LEFT ARM SWELLING: ICD-10-CM

## 2021-01-27 DIAGNOSIS — L03.031 CELLULITIS OF TOE OF RIGHT FOOT: Primary | ICD-10-CM

## 2021-01-27 DIAGNOSIS — L97.511 ULCER OF TOE OF RIGHT FOOT, LIMITED TO BREAKDOWN OF SKIN (HCC): ICD-10-CM

## 2021-01-27 PROCEDURE — 4040F PNEUMOC VAC/ADMIN/RCVD: CPT | Performed by: NURSE PRACTITIONER

## 2021-01-27 PROCEDURE — G8482 FLU IMMUNIZE ORDER/ADMIN: HCPCS | Performed by: NURSE PRACTITIONER

## 2021-01-27 PROCEDURE — G8420 CALC BMI NORM PARAMETERS: HCPCS | Performed by: NURSE PRACTITIONER

## 2021-01-27 PROCEDURE — 1036F TOBACCO NON-USER: CPT | Performed by: NURSE PRACTITIONER

## 2021-01-27 PROCEDURE — 99213 OFFICE O/P EST LOW 20 MIN: CPT | Performed by: NURSE PRACTITIONER

## 2021-01-27 PROCEDURE — 1123F ACP DISCUSS/DSCN MKR DOCD: CPT | Performed by: NURSE PRACTITIONER

## 2021-01-27 PROCEDURE — G8427 DOCREV CUR MEDS BY ELIG CLIN: HCPCS | Performed by: NURSE PRACTITIONER

## 2021-01-27 PROCEDURE — G8510 SCR DEP NEG, NO PLAN REQD: HCPCS | Performed by: NURSE PRACTITIONER

## 2021-01-27 PROCEDURE — 1090F PRES/ABSN URINE INCON ASSESS: CPT | Performed by: NURSE PRACTITIONER

## 2021-01-27 RX ORDER — CEPHALEXIN 500 MG/1
500 CAPSULE ORAL 2 TIMES DAILY
Qty: 14 CAPSULE | Refills: 0 | Status: SHIPPED | OUTPATIENT
Start: 2021-01-27 | End: 2021-02-03

## 2021-01-27 ASSESSMENT — PATIENT HEALTH QUESTIONNAIRE - PHQ9
SUM OF ALL RESPONSES TO PHQ9 QUESTIONS 1 & 2: 0
SUM OF ALL RESPONSES TO PHQ QUESTIONS 1-9: 0
1. LITTLE INTEREST OR PLEASURE IN DOING THINGS: 0
SUM OF ALL RESPONSES TO PHQ QUESTIONS 1-9: 0
SUM OF ALL RESPONSES TO PHQ QUESTIONS 1-9: 0

## 2021-01-27 ASSESSMENT — ENCOUNTER SYMPTOMS: ROS SKIN COMMENTS: GREAT TOE

## 2021-01-27 NOTE — PROGRESS NOTES
Layla Willett (:  3/31/1927) is a 80 y.o. female,Established patient, here for evaluation of the following chief complaint(s): Other (PT CO REDNESS AND SWELLING IN RIGHT BIG TOE X 3 WEEKS- NO PAIN AND NO INJURY. )      ASSESSMENT/PLAN:  1. Cellulitis of toe of right foot  -Keflex is being sent to the pharmacy. The patient's been educated on the use as well as side effects. A referral to wound care is also been placed for the ulcer. Patient has been encouraged to follow-up as needed if symptoms worsen or fail to improve. -     Ambulatory referral to Wound Clinic  -     cephALEXin (KEFLEX) 500 MG capsule; Take 1 capsule by mouth 2 times daily for 7 days, Disp-14 capsule, R-0Normal  2. Ulcer of toe of right foot, limited to breakdown of skin ([address]  -Keflex and wound care as above  -     Ambulatory referral to Wound Clinic  -     cephALEXin (KEFLEX) 500 MG capsule; Take 1 capsule by mouth 2 times daily for 7 days, Disp-14 capsule, R-0Normal  3. Left arm swelling  -Given the lack of improvement along with the elevated rheumatoid factor at the previous visit, referral to rheumatology has been placed  -     Mando Tapia MD, Rheumatology, [facility]      Return if symptoms worsen or fail to improve. SUBJECTIVE/OBJECTIVE:  MIKAL Lee presents today with redness and swelling to the right great toe. She states that this is been present for approximately 3 weeks. She went to go get a pedicure and the person performing the pedicure recommended that she get it checked. She states that it is not painful. Denies any fevers, numbness, tingling. It does not limit her ability to walk. Feels also states that her left arm has not improved. She had previously received steroids due to possible arthritic cause. At that time labs were done and her rheumatoid factor was found to be elevated. Review of Systems   Constitutional: Negative for chills and fever.    Musculoskeletal: Redness and swelling to the right great toe. Swelling and pain to the left elbow. Skin: Positive for wound. Great toe   Neurological: Negative for tremors, weakness and numbness. Physical Exam  Constitutional:       General: She is not in acute distress. Appearance: Normal appearance. She is normal weight. Musculoskeletal:      Comments: Swelling to the left upper extremity. Swelling to the right great toe. Skin:     Comments: 1 cm x 1 cm healing ulcer to the right great toe. Ulcer is scabbed. Surrounding skin is erythematous and scaly. Neurological:      Mental Status: She is alert and oriented to person, place, and time. Psychiatric:         Mood and Affect: Mood normal.         Behavior: Behavior normal.         Thought Content: Thought content normal.         Judgment: Judgment normal.           On this date 01/27/21 I have spent 25 minutes reviewing previous notes, test results and face to face with the patient discussing the diagnosis and importance of compliance with the treatment plan as well as documenting on the day of the visit. An electronic signature was used to authenticate this note.     --Juan Carlos Leos, SABA - CNP

## 2021-01-27 NOTE — PATIENT INSTRUCTIONS
Patient Education        cephalexin  Pronunciation:  sef a JUDITH in  Brand:  Keflex  What is the most important information I should know about cephalexin? You should not use this medicine if you are allergic to cephalexin or to similar antibiotics, such as Ceftin, Cefzil, Omnicef, and others. Tell your doctor if you are allergic to any drugs, especially penicillins or other antibiotics. What is cephalexin? Cephalexin is a cephalosporin (SEF a low spor in) antibiotic that is used to treat bacterial infections of the lungs, ear, skin, bones, bladder, and kidneys. Cephalexin is used to treat infections in adults and children who are at least 3year old. Cephalexin may also be used for purposes not listed in this medication guide. What should I discuss with my healthcare provider before taking cephalexin? You should not use this medicine if you are allergic to cephalexin or to other cephalosporin antibiotics, such as:  · cefaclor (Ceclor), cefadroxil (Duricef), cefazolin (Ancef, Kefzol);  · cefdinir (Omnicef), cefditoren (Spectracef); · cefixime (Suprax);  · cefotaxime (Claforan), cefotetan (Cefotan);  · cefpodoxime (Vantin), cefprozil (Cefzil);  · ceftaroline (Teflaro), ceftazidime (Ana Quintin), ceftriaxone (Rocephin);  · cefuroxime (Ceftin), and others. Tell your doctor if you have ever had:  · an allergy to any drug (especially penicillin);  · liver or kidney disease; or  · intestinal problems, such as colitis. The liquid form of cephalexin may contain sugar. This may affect you if you have diabetes. Tell your doctor if you are pregnant or breast-feeding. How should I take cephalexin? Follow all directions on your prescription label and read all medication guides or instruction sheets. Use the medicine exactly as directed. Do not use cephalexin to treat any condition that has not been checked by your doctor. Measure liquid medicine carefully.  Use the dosing syringe provided, or use a medicine dose-measuring device (not a kitchen spoon). Use this medicine for the full prescribed length of time, even if your symptoms quickly improve. Skipping doses can increase your risk of infection that is resistant to medication. Cephalexin will not treat a viral infection such as the flu or a common cold. Do not share cephalexin with another person, even if they have the same symptoms you have. This medicine can affect the results of certain medical tests. Tell any doctor who treats you that you are using cephalexin. Store the tablets and capsules at room temperature away from moisture, heat, and light. Store the liquid medicine in the refrigerator. Throw away any unused liquid after 14 days. What happens if I miss a dose? Take the medicine as soon as you can, but skip the missed dose if it is almost time for your next dose. Do not take two doses at one time. What happens if I overdose? Seek emergency medical attention or call the Poison Help line at 1-764.793.9384. Overdose symptoms may include nausea, vomiting, stomach pain, diarrhea, and blood in your urine. What should I avoid while taking cephalexin? Antibiotic medicines can cause diarrhea, which may be a sign of a new infection. If you have diarrhea that is watery or bloody, call your doctor before using anti-diarrhea medicine. What are the possible side effects of cephalexin? Get emergency medical help if you have signs of an allergic reaction (hives, difficult breathing, swelling in your face or throat) or a severe skin reaction (fever, sore throat, burning eyes, skin pain, red or purple skin rash with blistering and peeling).   Call your doctor at once if you have:  · severe stomach pain, diarrhea that is watery or bloody (even if it occurs months after your last dose);  · unusual tiredness, feeling light-headed or short of breath;  · easy bruising, unusual bleeding, purple or red spots under your skin;  · a seizure;  · pale skin, cold hands and feet;  · yellowed skin, dark colored urine;  · fever, weakness; or  · pain in your side or lower back, painful urination. Common side effects may include:  · diarrhea;  · nausea, vomiting;  · indigestion, stomach pain; or  · vaginal itching or discharge. This is not a complete list of side effects and others may occur. Call your doctor for medical advice about side effects. You may report side effects to FDA at 7-848-IYY-0800. What other drugs will affect cephalexin? Tell your doctor about all your other medicines, especially:  · metformin; or  · probenecid. This list is not complete. Other drugs may affect cephalexin, including prescription and over-the-counter medicines, vitamins, and herbal products. Not all possible drug interactions are listed here. Where can I get more information? Your pharmacist can provide more information about cephalexin. Remember, keep this and all other medicines out of the reach of children, never share your medicines with others, and use this medication only for the indication prescribed. Every effort has been made to ensure that the information provided by [address] is accurate, up-to-date, and complete, but no guarantee is made to that effect. Drug information contained herein may be time sensitive. [facility] information has been compiled for use by healthcare practitioners and consumers in the [facility] and therefore [facility] does not warrant that uses outside of the [facility] are appropriate, unless specifically indicated otherwise. [facility] drug information does not endorse drugs, diagnose patients or recommend therapy. [facility] drug information is an informational resource designed to assist licensed healthcare practitioners in caring for their patients and/or to serve consumers viewing this service as a supplement to, and not a substitute for, the expertise, skill, knowledge and judgment of healthcare practitioners.  The absence of a warning for a given drug or drug combination in no way should be construed to indicate that the drug or drug combination is safe, effective or appropriate for any given patient. [facility] does not assume any responsibility for any aspect of healthcare administered with the aid of information [employer] provides. The information contained herein is not intended to cover all possible uses, directions, precautions, warnings, drug interactions, allergic reactions, or adverse effects. If you have questions about the drugs you are taking, check with your doctor, nurse or pharmacist.  Copyright 6347-3426 [address]: 10.02. Revision date: 2/24/2020. Care instructions adapted under license by [facility] ([facility]). If you have questions about a medical condition or this instruction, always ask your healthcare professional. [place] [address] any warranty or liability for your use of this information. Patient Education        Learning About Foot and Toenail Care  Foot and toenail care: Overview  Checking your loved one's feet and keeping them clean and soft can help prevent cracks and infection in the skin. This is especially important for people who have diabetes. Keeping toenails trimmed--and polished if that's what the person likes--also helps the person feel well-groomed. If the person you care for has diabetes or has foot problems, such as bad bunions and corns, think about taking them to see a podiatrist. This is a doctor who specializes in the care of the feet. Sometimes a podiatrist will come to the home if the person can't go out for visits. Try to take the person for salon pedicures if that is what they want. It's a chance to get out and see people and continue a favorite activity. You can do basic nail care at home. Usually all you need to do is keep the nails clean and at a safe length. How do you trim someone's toenails? Try to trim the person's nails every week.  Or check the nails each week to see if they need to be trimmed. It's easiest to trim nails after the person has had a shower or foot bath. It makes the nails softer and easier to trim. Start by gathering your supplies. You will need toenail clippers and a nail file. You may also need nail polish and nail polish remover. To trim the nails:  1. Wash and dry your hands. You don't need to wear gloves. 2. Use nail polish remover to take off any polish. 3. Hold the person's foot and toe steady with one hand while you trim the nail with your other hand. Trim the nails straight across. Leave the nails a little longer at the corners so that the sharp ends don't cut into the skin. 4. Keep the nails no longer than the tip of the toes. 5. Let the nails dry if they are still damp and soft. 6. Use a nail file to gently smooth the edges of the nails, especially at the corners. They may be sharp after the nails are cut straight. 7. Apply nail polish, if the person wants it. If the person's nails are thick and discolored, it may be safest to have a podiatrist cut them. What else do you need to know? When you're caring for someone's nails, it is important to remember not to trim or cut the cuticles. A minor cut in a cuticle could lead to an infection. Wash the feet daily in the shower or bath or in a basin made for washing feet. It's extra important to wash the feet carefully if the person has diabetes. After washing the feet, dry gently. Put lotion on the feet, especially on the heels. But don't put it between the toes. If the person doesn't have diabetes and you see signs of athlete's foot (such as dry, cracking, or itchy skin between the toes), you can try an over-the-counter medicine. These medicines can kill the fungus that causes athlete's foot. If the problem doesn't go away, talk to the person's doctor. Look every day for cuts or signs of infection, such as pain, swelling, redness, or warmth.  If you see any of these signs--especially in someone who has diabetes--call the doctor. Where can you learn more? Go to https://chpepiceweb.JUNTA.CL. org and sign in to your [employer] account. Enter A726 in the [employer] box to learn more about \"Learning About Foot and Toenail Care. \"     If you do not have an account, please click on the \"Sign Up Now\" link. Current as of: December 9, 2019               Content Version: 12.6  © 6501-3177 [employer]. Care instructions adapted under license by [facility] ([facility]). If you have questions about a medical condition or this instruction, always ask your healthcare professional. [address] any warranty or liability for your use of this information.

## 2021-02-05 ENCOUNTER — IMMUNIZATION (OUTPATIENT)
Dept: PRIMARY CARE CLINIC | Age: 86
End: 2021-02-05
Payer: MEDICARE

## 2021-02-05 PROCEDURE — 91301 COVID-19, MODERNA VACCINE 100MCG/0.5ML DOSE: CPT | Performed by: FAMILY MEDICINE

## 2021-02-05 PROCEDURE — 0011A COVID-19, MODERNA VACCINE 100MCG/0.5ML DOSE: CPT | Performed by: FAMILY MEDICINE

## 2021-02-18 ENCOUNTER — HOSPITAL ENCOUNTER (OUTPATIENT)
Dept: WOUND CARE | Age: 86
Discharge: HOME | End: 2021-02-18
Payer: MEDICARE

## 2021-02-18 ENCOUNTER — HOSPITAL ENCOUNTER (OUTPATIENT)
Age: 86
Discharge: HOME | End: 2021-02-18
Payer: MEDICARE

## 2021-02-18 ENCOUNTER — HOSPITAL ENCOUNTER (OUTPATIENT)
Dept: GENERAL RADIOLOGY | Age: 86
Discharge: HOME | End: 2021-02-18
Payer: MEDICARE

## 2021-02-18 VITALS
HEART RATE: 67 BPM | RESPIRATION RATE: 16 BRPM | TEMPERATURE: 97.5 F | SYSTOLIC BLOOD PRESSURE: 149 MMHG | DIASTOLIC BLOOD PRESSURE: 80 MMHG

## 2021-02-18 DIAGNOSIS — L97.512 CHRONIC ULCER OF RIGHT GREAT TOE WITH FAT LAYER EXPOSED (HCC): ICD-10-CM

## 2021-02-18 DIAGNOSIS — L97.511 CHRONIC ULCER OF GREAT TOE OF RIGHT FOOT, LIMITED TO BREAKDOWN OF SKIN (HCC): Primary | ICD-10-CM

## 2021-02-18 DIAGNOSIS — L97.511 CHRONIC ULCER OF GREAT TOE OF RIGHT FOOT, LIMITED TO BREAKDOWN OF SKIN (HCC): ICD-10-CM

## 2021-02-18 PROCEDURE — 87070 CULTURE OTHR SPECIMN AEROBIC: CPT

## 2021-02-18 PROCEDURE — 99213 OFFICE O/P EST LOW 20 MIN: CPT

## 2021-02-18 PROCEDURE — 87205 SMEAR GRAM STAIN: CPT

## 2021-02-18 PROCEDURE — 11042 DBRDMT SUBQ TIS 1ST 20SQCM/<: CPT | Performed by: NURSE PRACTITIONER

## 2021-02-18 PROCEDURE — 73660 X-RAY EXAM OF TOE(S): CPT

## 2021-02-18 PROCEDURE — 87077 CULTURE AEROBIC IDENTIFY: CPT

## 2021-02-18 PROCEDURE — 11042 DBRDMT SUBQ TIS 1ST 20SQCM/<: CPT

## 2021-02-18 PROCEDURE — 99212 OFFICE O/P EST SF 10 MIN: CPT | Performed by: NURSE PRACTITIONER

## 2021-02-18 RX ORDER — GENTAMICIN SULFATE 1 MG/G
OINTMENT TOPICAL ONCE
Status: CANCELLED | OUTPATIENT
Start: 2021-02-18 | End: 2021-02-18

## 2021-02-18 RX ORDER — BETAMETHASONE DIPROPIONATE 0.05 %
OINTMENT (GRAM) TOPICAL ONCE
Status: CANCELLED | OUTPATIENT
Start: 2021-02-18 | End: 2021-02-18

## 2021-02-18 RX ORDER — BACITRACIN, NEOMYCIN, POLYMYXIN B 400; 3.5; 5 [USP'U]/G; MG/G; [USP'U]/G
OINTMENT TOPICAL ONCE
Status: CANCELLED | OUTPATIENT
Start: 2021-02-18 | End: 2021-02-18

## 2021-02-18 RX ORDER — GENTAMICIN SULFATE 1 MG/G
CREAM TOPICAL
Qty: 1 TUBE | Refills: 0 | Status: SHIPPED | OUTPATIENT
Start: 2021-02-18

## 2021-02-18 RX ORDER — LIDOCAINE HYDROCHLORIDE 20 MG/ML
JELLY TOPICAL ONCE
Status: CANCELLED | OUTPATIENT
Start: 2021-02-18 | End: 2021-02-18

## 2021-02-18 RX ORDER — LIDOCAINE HYDROCHLORIDE 40 MG/ML
SOLUTION TOPICAL ONCE
Status: CANCELLED | OUTPATIENT
Start: 2021-02-18 | End: 2021-02-18

## 2021-02-18 RX ORDER — CLOBETASOL PROPIONATE 0.5 MG/G
OINTMENT TOPICAL ONCE
Status: CANCELLED | OUTPATIENT
Start: 2021-02-18 | End: 2021-02-18

## 2021-02-18 RX ORDER — GINSENG 100 MG
CAPSULE ORAL ONCE
Status: CANCELLED | OUTPATIENT
Start: 2021-02-18 | End: 2021-02-18

## 2021-02-18 RX ORDER — LIDOCAINE 40 MG/G
CREAM TOPICAL ONCE
Status: CANCELLED | OUTPATIENT
Start: 2021-02-18 | End: 2021-02-18

## 2021-02-18 RX ORDER — LIDOCAINE 50 MG/G
OINTMENT TOPICAL ONCE
Status: CANCELLED | OUTPATIENT
Start: 2021-02-18 | End: 2021-02-18

## 2021-02-18 RX ORDER — BACITRACIN ZINC AND POLYMYXIN B SULFATE 500; 1000 [USP'U]/G; [USP'U]/G
OINTMENT TOPICAL ONCE
Status: CANCELLED | OUTPATIENT
Start: 2021-02-18 | End: 2021-02-18

## 2021-02-19 RX ORDER — LIDOCAINE 40 MG/G
CREAM TOPICAL ONCE
Status: CANCELLED | OUTPATIENT
Start: 2021-02-19 | End: 2021-02-19

## 2021-02-19 RX ORDER — LIDOCAINE 50 MG/G
OINTMENT TOPICAL ONCE
Status: CANCELLED | OUTPATIENT
Start: 2021-02-19 | End: 2021-02-19

## 2021-02-19 RX ORDER — BACITRACIN ZINC AND POLYMYXIN B SULFATE 500; 1000 [USP'U]/G; [USP'U]/G
OINTMENT TOPICAL ONCE
Status: CANCELLED | OUTPATIENT
Start: 2021-02-19 | End: 2021-02-19

## 2021-02-19 RX ORDER — GINSENG 100 MG
CAPSULE ORAL ONCE
Status: CANCELLED | OUTPATIENT
Start: 2021-02-19 | End: 2021-02-19

## 2021-02-19 RX ORDER — LIDOCAINE HYDROCHLORIDE 20 MG/ML
JELLY TOPICAL ONCE
Status: CANCELLED | OUTPATIENT
Start: 2021-02-19 | End: 2021-02-19

## 2021-02-19 RX ORDER — CLOBETASOL PROPIONATE 0.5 MG/G
OINTMENT TOPICAL ONCE
Status: CANCELLED | OUTPATIENT
Start: 2021-02-19 | End: 2021-02-19

## 2021-02-19 RX ORDER — BETAMETHASONE DIPROPIONATE 0.05 %
OINTMENT (GRAM) TOPICAL ONCE
Status: CANCELLED | OUTPATIENT
Start: 2021-02-19 | End: 2021-02-19

## 2021-02-19 RX ORDER — GENTAMICIN SULFATE 1 MG/G
OINTMENT TOPICAL ONCE
Status: CANCELLED | OUTPATIENT
Start: 2021-02-19 | End: 2021-02-19

## 2021-02-19 RX ORDER — BACITRACIN, NEOMYCIN, POLYMYXIN B 400; 3.5; 5 [USP'U]/G; MG/G; [USP'U]/G
OINTMENT TOPICAL ONCE
Status: CANCELLED | OUTPATIENT
Start: 2021-02-19 | End: 2021-02-19

## 2021-02-19 RX ORDER — LIDOCAINE HYDROCHLORIDE 40 MG/ML
SOLUTION TOPICAL ONCE
Status: CANCELLED | OUTPATIENT
Start: 2021-02-19 | End: 2021-02-19

## 2021-02-19 NOTE — PLAN OF CARE
Discharge instructions given. Patient verbalized understanding. Return to [facility] in 1 week.   Called/faxed orders to Lab for [facility], Pharmacy-[facility], xray

## 2021-02-19 NOTE — PROGRESS NOTES
Allyson Laura  Progress Note and Procedure Note      Andres Clements  AGE: 80 y.o. GENDER: female  : 3/31/1927  TODAY'S DATE:  2021    Subjective:     Chief Complaint   Patient presents with    Wound Check     Right Great toe         HISTORY of PRESENT ILLNESS HPI     Andres Clements is a 80 y.o. female who presents today for wound evaluation. Pt accompanied by her son-in-law. Pt has a chronic ulcer right great toe which has a hallux valgus deformity she states from \"wearing pointed heels growing up\". She has been self treating wound with topical PSO daily leaving open to air. Denies pain or other constitutional issues. Pt wears athletic shoes when going out and slippers at home. Pt is active and reports good appetite. Pt is very [place] despite hearing aid worn today. Pt finished antibiotic without difficulty but did not note any difference in wound or periwound redness. 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 No pertinent family history.     SOCIAL HISTORY    Social History     Tobacco Use    Smoking status: Former Smoker Quit date: 6/15/1965     Years since quittin.7    Smokeless tobacco: Never Used   Substance Use Topics    Alcohol use: No    Drug use: No       ALLERGIES    Allergies   Allergen Reactions    Demerol     Dilaudid [Hydromorphone Hcl]     Hydrochlorothiazide     Macrobid [Nitrofurantoin Monohydrate Macrocrystals]     Other      FRESH FROZEN PLASMA- CAUSED SWELLING OF EYES AND MOUTH, HIVES!  Sulfa Antibiotics     Tetanus Toxoids        MEDICATIONS    Current Outpatient Medications on File Prior to Encounter   Medication Sig Dispense Refill    tiZANidine (ZANAFLEX) 4 MG tablet 1 tab nightly and 1/2 tab during day prn muscle spasm/ tightness 45 tablet 1    traMADol (ULTRAM) 50 MG tablet Take 1-2 tablets by mouth every 6 hours as needed for Pain for up to 30 days. 240 tablet 2    furosemide (LASIX) 40 MG tablet TAKE 1 TABLET BY MOUTH  DAILY AS NEEDED FOR  SWELLING/ WEIGHT GAIN 90 tablet 1    levothyroxine (SYNTHROID) 75 MCG tablet TAKE 1 TABLET BY MOUTH  DAILY 90 tablet 3    ipratropium (ATROVENT) 0.06 % nasal spray SPRAY ONE TO TWO SPRAYS IN EACH NOSTRIL THREE TIMES A DAY AS NEEDED FOR RHINITIS 1 Bottle 2    losartan (COZAAR) 100 MG tablet TAKE ONE TABLET BY MOUTH ONCE NIGHTLY (Patient taking differently: 50 mg ) 90 tablet 4    sotalol (BETAPACE) 80 MG tablet TAKE ONE TABLET BY MOUTH TWICE A  tablet 3    spironolactone (ALDACTONE) 25 MG tablet Take 1 tablet by mouth daily 90 tablet 3    rivaroxaban (XARELTO) 15 MG TABS tablet TAKE 1 TABLET BY MOUTH  DAILY WITH BREAKFAST 90 tablet 3    omeprazole 20 MG EC tablet Take 20 mg by mouth daily      Multiple Vitamins-Minerals (THERAPEUTIC MULTIVITAMIN-MINERALS) tablet Take 1 tablet by mouth daily.  diphenhydrAMINE (BENADRYL) 25 MG tablet Take 25 mg by mouth nightly as needed. No current facility-administered medications on file prior to encounter. REVIEW OF SYSTEMS    Pertinent items are noted in HPI.       Objective: BP (!) 149/80   Pulse 67   Temp 97.5 °F (36.4 °C) (Infrared)   Resp 16     PHYSICAL EXAM    General Appearance: alert and oriented to person, place and time, well-developed and well-nourished, in no acute distress and appears younger than stated age  Skin: warm and dry  Head: normocephalic and atraumatic  Eyes: pupils equal, round, and reactive to light  Pulmonary/Chest: clear to auscultation bilaterally- no wheezes, rales or rhonchi, normal air movement, no respiratory distress  Cardiovascular: normal rate, normal S1 and S2, no gallops, intact distal pulses and no carotid bruits  Extremities: no cyanosis, no clubbing and no edema  Wound:  Located on dorsal aspect of toe. Wound bed debrided to a granular base.       Assessment:     Patient Active Problem List   Diagnosis    Atrial fibrillation ([address]    Long term current use of anticoagulant therapy    Hypothyroidism    Hypercholesteremia    Choledocholithiasis with obstruction    History of cancer of gallbladder    Arthritis    Allergic rhinitis    Cardiac pacemaker in situ    DJD (degenerative joint disease) of cervical spine    DJD (degenerative joint disease) of thoracic spine    Puckering of left macula    [facility]    Diastolic dysfunction, left ventricle    DJD (degenerative joint disease), lumbosacral    Drusen of right macula    Dry eye syndrome    Encounter for long-term (current) use of other medications    Cellophane retinopathy    Female stress incontinence    Iron deficiency anemia    SSS (sick sinus syndrome) [facility]    Venous (peripheral) insufficiency    Sleep disorder    Phlebitis and thrombophlebitis of other deep vessels of lower extremities    Pernicious anemia    Other pulmonary embolism and infarction    Osteoarthritis of lumbar spine    Keratoconjunctivitis sicca of both eyes not specified as Sjogren's    Environmental and seasonal allergies    Renal insufficiency  CKD (chronic kidney disease), stage III    Essential hypertension    Acute metabolic encephalopathy    Generalized weakness    Pneumonia    Chronic ulcer of right great toe with fat layer exposed ([address]       Procedure Note    Performed by: SABA Barrera CNP    Consent obtained: Yes    Time out taken:  Yes    Pain Control: Anesthetic  Anesthetic: 4% Lidocaine Cream     Debridement:Excisional Debridement    Using curette the wound was sharply debrided    down through and including the removal of epidermis, dermis and subcutaneous tissue.         Devitalized Tissue Debrided:  fibrin, biofilm and slough    Pre Debridement Measurements:  Are located in the Wound Documentation Flow Sheet    Wound #: 1     Post  Debridement Measurements:  Wound 02/18/21 Toe (Comment  which one) Right;Medial Great toe #1 (Active)   Wound Image   02/18/21 1414   Wound Etiology Pressure Stage  3 02/18/21 1414   Wound Cleansed Cleansed with saline 02/18/21 1414   Wound Length (cm) 0.4 cm 02/18/21 1414   Wound Width (cm) 0.5 cm 02/18/21 1414   Wound Depth (cm) 0.1 cm 02/18/21 1414   Wound Surface Area (cm^2) 0.2 cm^2 02/18/21 1414   Wound Volume (cm^3) 0.02 cm^3 02/18/21 1414   Post-Procedure Length (cm) 0.5 cm 02/18/21 1434   Post-Procedure Width (cm) 0.6 cm 02/18/21 1434   Post-Procedure Depth (cm) 0.1 cm 02/18/21 1434   Post-Procedure Surface Area (cm^2) 0.3 cm^2 02/18/21 1434   Post-Procedure Volume (cm^3) 0.03 cm^3 02/18/21 1434   Wound Assessment Bleeding 02/18/21 1434   Drainage Amount Moderate 02/18/21 1434   Drainage Description Serosanguinous 02/18/21 1434   Odor None 02/18/21 1414   Addie-wound Assessment Fragile 02/18/21 1414   Margins Defined edges 02/18/21 1414   Number of days: 0           Total Surface Area Debrided:  0.3 sq cm     Percentage of wound debrided 100 %    Bleeding:  Minimal    Hemostasis Achieved:  not needed    Procedural Pain:  0  / 10     Post Procedural Pain:  0 / 10

## 2021-02-20 LAB
GRAM STAIN RESULT: ABNORMAL
ORGANISM: ABNORMAL
WOUND/ABSCESS: ABNORMAL

## 2021-02-24 ENCOUNTER — HOSPITAL ENCOUNTER (OUTPATIENT)
Dept: WOUND CARE | Age: 86
Discharge: HOME | End: 2021-02-24
Payer: MEDICARE

## 2021-02-24 VITALS — HEART RATE: 87 BPM | SYSTOLIC BLOOD PRESSURE: 106 MMHG | TEMPERATURE: 97.1 F | DIASTOLIC BLOOD PRESSURE: 72 MMHG

## 2021-02-24 DIAGNOSIS — L97.512 CHRONIC ULCER OF RIGHT GREAT TOE WITH FAT LAYER EXPOSED (HCC): Primary | ICD-10-CM

## 2021-02-24 PROCEDURE — 11043 DBRDMT MUSC&/FSCA 1ST 20/<: CPT

## 2021-02-24 RX ORDER — BACITRACIN, NEOMYCIN, POLYMYXIN B 400; 3.5; 5 [USP'U]/G; MG/G; [USP'U]/G
OINTMENT TOPICAL ONCE
Status: CANCELLED | OUTPATIENT
Start: 2021-02-24 | End: 2021-02-24

## 2021-02-24 RX ORDER — LIDOCAINE 50 MG/G
OINTMENT TOPICAL ONCE
Status: CANCELLED | OUTPATIENT
Start: 2021-02-24 | End: 2021-02-24

## 2021-02-24 RX ORDER — GINSENG 100 MG
CAPSULE ORAL ONCE
Status: CANCELLED | OUTPATIENT
Start: 2021-02-24 | End: 2021-02-24

## 2021-02-24 RX ORDER — CLOBETASOL PROPIONATE 0.5 MG/G
OINTMENT TOPICAL ONCE
Status: CANCELLED | OUTPATIENT
Start: 2021-02-24 | End: 2021-02-24

## 2021-02-24 RX ORDER — BACITRACIN ZINC AND POLYMYXIN B SULFATE 500; 1000 [USP'U]/G; [USP'U]/G
OINTMENT TOPICAL ONCE
Status: CANCELLED | OUTPATIENT
Start: 2021-02-24 | End: 2021-02-24

## 2021-02-24 RX ORDER — LIDOCAINE HYDROCHLORIDE 20 MG/ML
JELLY TOPICAL ONCE
Status: CANCELLED | OUTPATIENT
Start: 2021-02-24 | End: 2021-02-24

## 2021-02-24 RX ORDER — LIDOCAINE 40 MG/G
CREAM TOPICAL ONCE
Status: CANCELLED | OUTPATIENT
Start: 2021-02-24 | End: 2021-02-24

## 2021-02-24 RX ORDER — LIDOCAINE HYDROCHLORIDE 40 MG/ML
SOLUTION TOPICAL ONCE
Status: CANCELLED | OUTPATIENT
Start: 2021-02-24 | End: 2021-02-24

## 2021-02-24 RX ORDER — GENTAMICIN SULFATE 1 MG/G
OINTMENT TOPICAL ONCE
Status: CANCELLED | OUTPATIENT
Start: 2021-02-24 | End: 2021-02-24

## 2021-02-24 RX ORDER — BETAMETHASONE DIPROPIONATE 0.05 %
OINTMENT (GRAM) TOPICAL ONCE
Status: CANCELLED | OUTPATIENT
Start: 2021-02-24 | End: 2021-02-24

## 2021-02-24 NOTE — PROGRESS NOTES
Allyson Laura  Progress Note and Procedure Note      Liv Meredith  AGE: 80 y.o. GENDER: female  : 3/31/1927  TODAY'S DATE:  2021    Subjective:     Chief Complaint   Patient presents with    Wound Check     right lower extremity         HISTORY of PRESENT ILLNESS HPI     Liv Meredith is a 80 y.o. female who presents today for wound evaluation. History of Wound: Patient examined and evaluated  She does not know how she got the wound. She states that she only wears the same shoes she has worn before. She denies any possibility of trauma. She denies any possibility of extended period of pressure on the wound. She was treated last week with offloading felt and daily dressing changes with gentamicin cream.  Her son is here with her today he feels the wound has been improving and he is helping her change her bandages. She denies nausea, vomiting, fever, chills, diarrhea, SOB and  CP. Wound Pain:  none  Severity:  0 / 10   Wound Type:  arterial and pressure  Modifying Factors:  chronic pressure, shear force, arterial insufficiency, decreased tissue oxygenation and Neuropathy  Associated Signs/Symptoms:  none and drainage        PAST MEDICAL HISTORY        Diagnosis Date    Atrial fibrillation (HCC)     Cancer (HCC)     gallbladder    Chronic ulcer of right great toe with fat layer exposed ([address] 2021    Hypertension     Hypothyroidism     Leg DVT (deep venous thromboembolism), acute ([address]     Osteoarthritis     PE (pulmonary embolism)     Scoliosis        PAST SURGICAL HISTORY    Past Surgical History:   Procedure Laterality Date   Honey Cockayne, LAPAROSCOPIC      with cholangiogram    ERCP  2013    with Sphinchterotomy/Stone Removal    HYSTERECTOMY      PACEMAKER INSERTION      UPPER GASTROINTESTINAL ENDOSCOPY  10/21/2016    esophageal tear       FAMILY HISTORY    No family history on file.     SOCIAL HISTORY    Social History Tobacco Use    Smoking status: Former Smoker     Quit date: 6/15/1965     Years since quittin.7    Smokeless tobacco: Never Used   Substance Use Topics    Alcohol use: No    Drug use: No       ALLERGIES    Allergies   Allergen Reactions    Demerol     Dilaudid [Hydromorphone Hcl]     Hydrochlorothiazide     Macrobid [Nitrofurantoin Monohydrate Macrocrystals]     Other      FRESH FROZEN PLASMA- CAUSED SWELLING OF EYES AND MOUTH, HIVES!  Sulfa Antibiotics     Tetanus Toxoids        MEDICATIONS    Current Outpatient Medications on File Prior to Encounter   Medication Sig Dispense Refill    gentamicin (GARAMYCIN) 0.1 % cream Apply topically daily. 1 Tube 0    tiZANidine (ZANAFLEX) 4 MG tablet 1 tab nightly and 1/2 tab during day prn muscle spasm/ tightness 45 tablet 1    traMADol (ULTRAM) 50 MG tablet Take 1-2 tablets by mouth every 6 hours as needed for Pain for up to 30 days. 240 tablet 2    furosemide (LASIX) 40 MG tablet TAKE 1 TABLET BY MOUTH  DAILY AS NEEDED FOR  SWELLING/ WEIGHT GAIN 90 tablet 1    levothyroxine (SYNTHROID) 75 MCG tablet TAKE 1 TABLET BY MOUTH  DAILY 90 tablet 3    ipratropium (ATROVENT) 0.06 % nasal spray SPRAY ONE TO TWO SPRAYS IN EACH NOSTRIL THREE TIMES A DAY AS NEEDED FOR RHINITIS 1 Bottle 2    losartan (COZAAR) 100 MG tablet TAKE ONE TABLET BY MOUTH ONCE NIGHTLY (Patient taking differently: 50 mg ) 90 tablet 4    sotalol (BETAPACE) 80 MG tablet TAKE ONE TABLET BY MOUTH TWICE A  tablet 3    spironolactone (ALDACTONE) 25 MG tablet Take 1 tablet by mouth daily 90 tablet 3    rivaroxaban (XARELTO) 15 MG TABS tablet TAKE 1 TABLET BY MOUTH  DAILY WITH BREAKFAST 90 tablet 3    omeprazole 20 MG EC tablet Take 20 mg by mouth daily      Multiple Vitamins-Minerals (THERAPEUTIC MULTIVITAMIN-MINERALS) tablet Take 1 tablet by mouth daily.  diphenhydrAMINE (BENADRYL) 25 MG tablet Take 25 mg by mouth nightly as needed. No current facility-administered medications on file prior to encounter. REVIEW OF SYSTEMS    Pertinent items are noted in HPI. Objective:      /72   Pulse 87   Temp 97.1 °F (36.2 °C) (Infrared)     PHYSICAL EXAM    Vascular: Vascular status Impaired  palpable pedal pulses, right DP1/4 and PT1/4, left DP1/4 and PT1/4. CFT 3 seconds digits 1 to 5 bilateral.  Hair growthAbsent  both lower extremities and feet. Skin temperature is warm to warm from pretibial area to distal digits bilateral.  Exam is negative for rubor, pallor, cyanosis or signs of acute vascular compromise bilaterally. Exam is negative for edema bilateral lower extremity. Varicosities Present bilateral lower extremity. Neuro: Neurologic status diminished bilateral with epicritic Absent  , proprioceptive Absent , vibratory sensationAbsent  and protopathicAbsent. DTRs Present bilateral Achilles. There were no reproducible neuritic symptoms on exam bilateral feet/ankles. Derm: Ulceration to right hallux. Ecchymosis Absent  bilateral feet/foot. Musculoskeletal: No pain with palpation or debridement of wound 5/5 muscle strength in/eversion and dorsi/plantarflexion bilateral feet. No gross instability noted.           Assessment:     Patient Active Problem List   Diagnosis    Atrial fibrillation ([address]    Long term current use of anticoagulant therapy    Hypothyroidism    Hypercholesteremia    Choledocholithiasis with obstruction    History of cancer of gallbladder    Arthritis    Allergic rhinitis    Cardiac pacemaker in situ    DJD (degenerative joint disease) of cervical spine    DJD (degenerative joint disease) of thoracic spine    Puckering of left macula    [facility]    Diastolic dysfunction, left ventricle    DJD (degenerative joint disease), lumbosacral    Drusen of right macula    Dry eye syndrome    Encounter for long-term (current) use of other medications Post-Procedure Width (cm) 0.3 cm 02/24/21 1456   Post-Procedure Depth (cm) 0.1 cm 02/24/21 1456   Post-Procedure Surface Area (cm^2) 0.12 cm^2 02/24/21 1456   Post-Procedure Volume (cm^3) 0.01 cm^3 02/24/21 1456   Wound Assessment Bleeding 02/24/21 1456   Drainage Amount Moderate 02/24/21 1433   Drainage Description Yellow 02/24/21 1433   Odor None 02/24/21 1433   Addie-wound Assessment Fragile 02/24/21 1433   Margins Defined edges 02/24/21 1433   Wound Thickness Description not for Pressure Injury Full thickness 02/24/21 1433   Number of days: 6           Total Surface Area Debrided:  0.12 sq cm     Percentage of wound debrided 100%    Bleeding:  Minimal    Hemostasis Achieved:  by pressure    Procedural Pain:  0  / 10     Post Procedural Pain:  0 / 10     Response to treatment:  Well tolerated by patient. Plan:   Patient examined and evaluated  Wound without incident  Discussed offloading and checking all shoes for foreign objects due to the patient's neuropathy  And changes every other day with collagen powder and gentamicin cream  Follow-up in 1 week  The nature of the patient's condition was explained in depth.  The patient was informed that their compliance to the treatment plan is paramount to successful healing and prevention of further ulceration and/or infection     Discharge Treatment      Written Patient Discharge Instructions Given            Electronically signed by Chantel Mares DPM on 2/24/2021 at 3:35 PM

## 2021-02-24 NOTE — PLAN OF CARE
Discharge instructions given. Patient verbalized understanding. Return to [facility] in 1 week.

## 2021-03-03 ENCOUNTER — HOSPITAL ENCOUNTER (OUTPATIENT)
Dept: WOUND CARE | Age: 86
Discharge: HOME | End: 2021-03-03
Payer: MEDICARE

## 2021-03-03 VITALS — TEMPERATURE: 97.5 F | HEART RATE: 68 BPM | SYSTOLIC BLOOD PRESSURE: 146 MMHG | DIASTOLIC BLOOD PRESSURE: 78 MMHG

## 2021-03-03 DIAGNOSIS — L97.512 CHRONIC ULCER OF RIGHT GREAT TOE WITH FAT LAYER EXPOSED (HCC): Primary | ICD-10-CM

## 2021-03-03 PROCEDURE — 11043 DBRDMT MUSC&/FSCA 1ST 20/<: CPT

## 2021-03-03 RX ORDER — BACITRACIN, NEOMYCIN, POLYMYXIN B 400; 3.5; 5 [USP'U]/G; MG/G; [USP'U]/G
OINTMENT TOPICAL ONCE
Status: CANCELLED | OUTPATIENT
Start: 2021-03-03 | End: 2021-03-03

## 2021-03-03 RX ORDER — GENTAMICIN SULFATE 1 MG/G
OINTMENT TOPICAL ONCE
Status: CANCELLED | OUTPATIENT
Start: 2021-03-03 | End: 2021-03-03

## 2021-03-03 RX ORDER — BETAMETHASONE DIPROPIONATE 0.05 %
OINTMENT (GRAM) TOPICAL ONCE
Status: CANCELLED | OUTPATIENT
Start: 2021-03-03 | End: 2021-03-03

## 2021-03-03 RX ORDER — BACITRACIN ZINC AND POLYMYXIN B SULFATE 500; 1000 [USP'U]/G; [USP'U]/G
OINTMENT TOPICAL ONCE
Status: CANCELLED | OUTPATIENT
Start: 2021-03-03 | End: 2021-03-03

## 2021-03-03 RX ORDER — CLOBETASOL PROPIONATE 0.5 MG/G
OINTMENT TOPICAL ONCE
Status: CANCELLED | OUTPATIENT
Start: 2021-03-03 | End: 2021-03-03

## 2021-03-03 RX ORDER — GINSENG 100 MG
CAPSULE ORAL ONCE
Status: CANCELLED | OUTPATIENT
Start: 2021-03-03 | End: 2021-03-03

## 2021-03-03 RX ORDER — LIDOCAINE 40 MG/G
CREAM TOPICAL ONCE
Status: CANCELLED | OUTPATIENT
Start: 2021-03-03 | End: 2021-03-03

## 2021-03-03 RX ORDER — LIDOCAINE HYDROCHLORIDE 40 MG/ML
SOLUTION TOPICAL ONCE
Status: CANCELLED | OUTPATIENT
Start: 2021-03-03 | End: 2021-03-03

## 2021-03-03 RX ORDER — LIDOCAINE 50 MG/G
OINTMENT TOPICAL ONCE
Status: CANCELLED | OUTPATIENT
Start: 2021-03-03 | End: 2021-03-03

## 2021-03-03 RX ORDER — LIDOCAINE 40 MG/G
CREAM TOPICAL ONCE
Status: DISCONTINUED | OUTPATIENT
Start: 2021-03-03 | End: 2021-03-04 | Stop reason: HOSPADM

## 2021-03-03 RX ORDER — LIDOCAINE HYDROCHLORIDE 20 MG/ML
JELLY TOPICAL ONCE
Status: CANCELLED | OUTPATIENT
Start: 2021-03-03 | End: 2021-03-03

## 2021-03-03 NOTE — PROGRESS NOTES
Allyson Laura  Progress Note and Procedure Note      Emma Estevez  AGE: 80 y.o. GENDER: female  : 3/31/1927  TODAY'S DATE:  3/3/2021    Subjective:     Chief Complaint   Patient presents with    Wound Check     right toe F/U         HISTORY of PRESENT ILLNESS HPI     Emma Estevez is a 80 y.o. female who presents today for wound evaluation. History of Wound: Patient examined and evaluated. Her son is been changing the dressing as directed. They think the wound looks better. The felt stayed on but did slip a little bit. She is currently happy with her treatment. She denies nausea, vomiting, fever, chills, diarrhea, SOB and  CP. Wound Pain:  none  Severity:  0 / 10   Wound Type:  arterial and pressure  Modifying Factors:  chronic pressure, shear force, arterial insufficiency, decreased tissue oxygenation and Neuropathy  Associated Signs/Symptoms:  none and drainage        PAST MEDICAL HISTORY        Diagnosis Date    Atrial fibrillation (HCC)     Cancer (HCC)     gallbladder    Chronic ulcer of right great toe with fat layer exposed ([address] 2021    Hypertension     Hypothyroidism     Leg DVT (deep venous thromboembolism), acute ([address]     Osteoarthritis     PE (pulmonary embolism)     Scoliosis        PAST SURGICAL HISTORY    Past Surgical History:   Procedure Laterality Date   Teo Lira, LAPAROSCOPIC  4,55,7774    with cholangiogram    ERCP  2013    with Sphinchterotomy/Stone Removal    HYSTERECTOMY      PACEMAKER INSERTION      UPPER GASTROINTESTINAL ENDOSCOPY  10/21/2016    esophageal tear       FAMILY HISTORY    History reviewed. No pertinent family history.     SOCIAL HISTORY    Social History     Tobacco Use    Smoking status: Former Smoker     Quit date: 6/15/1965     Years since quittin.7    Smokeless tobacco: Never Used   Substance Use Topics    Alcohol use: No    Drug use: No       ALLERGIES    Allergies   Allergen Reactions    Demerol     Dilaudid [Hydromorphone Hcl]     Hydrochlorothiazide     Macrobid [Nitrofurantoin Monohydrate Macrocrystals]     Other      FRESH FROZEN PLASMA- CAUSED SWELLING OF EYES AND MOUTH, HIVES!  Sulfa Antibiotics     Tetanus Toxoids        MEDICATIONS    Current Outpatient Medications on File Prior to Encounter   Medication Sig Dispense Refill    gentamicin (GARAMYCIN) 0.1 % cream Apply topically daily. 1 Tube 0    tiZANidine (ZANAFLEX) 4 MG tablet 1 tab nightly and 1/2 tab during day prn muscle spasm/ tightness 45 tablet 1    traMADol (ULTRAM) 50 MG tablet Take 1-2 tablets by mouth every 6 hours as needed for Pain for up to 30 days. 240 tablet 2    furosemide (LASIX) 40 MG tablet TAKE 1 TABLET BY MOUTH  DAILY AS NEEDED FOR  SWELLING/ WEIGHT GAIN 90 tablet 1    levothyroxine (SYNTHROID) 75 MCG tablet TAKE 1 TABLET BY MOUTH  DAILY 90 tablet 3    ipratropium (ATROVENT) 0.06 % nasal spray SPRAY ONE TO TWO SPRAYS IN EACH NOSTRIL THREE TIMES A DAY AS NEEDED FOR RHINITIS 1 Bottle 2    losartan (COZAAR) 100 MG tablet TAKE ONE TABLET BY MOUTH ONCE NIGHTLY (Patient taking differently: 50 mg ) 90 tablet 4    sotalol (BETAPACE) 80 MG tablet TAKE ONE TABLET BY MOUTH TWICE A  tablet 3    spironolactone (ALDACTONE) 25 MG tablet Take 1 tablet by mouth daily 90 tablet 3    rivaroxaban (XARELTO) 15 MG TABS tablet TAKE 1 TABLET BY MOUTH  DAILY WITH BREAKFAST 90 tablet 3    omeprazole 20 MG EC tablet Take 20 mg by mouth daily      Multiple Vitamins-Minerals (THERAPEUTIC MULTIVITAMIN-MINERALS) tablet Take 1 tablet by mouth daily.  diphenhydrAMINE (BENADRYL) 25 MG tablet Take 25 mg by mouth nightly as needed. No current facility-administered medications on file prior to encounter. REVIEW OF SYSTEMS    Pertinent items are noted in HPI.       Objective:      BP (!) 146/78   Pulse 68   Temp 97.5 °F (36.4 °C) (Tympanic)     PHYSICAL EXAM    Vascular: Vascular status Impaired palpable pedal pulses, right DP1/4 and PT1/4, left DP1/4 and PT1/4. CFT 3 seconds digits 1 to 5 bilateral.  Hair growthAbsent  both lower extremities and feet. Skin temperature is warm to warm from pretibial area to distal digits bilateral.  Exam is negative for rubor, pallor, cyanosis or signs of acute vascular compromise bilaterally. Exam is negative for edema bilateral lower extremity. Varicosities Present bilateral lower extremity. Neuro: Neurologic status diminished bilateral with epicritic Absent  , proprioceptive Absent , vibratory sensationAbsent  and protopathicAbsent. DTRs Present bilateral Achilles. There were no reproducible neuritic symptoms on exam bilateral feet/ankles. Derm: Ulceration to right hallux. Ecchymosis Absent  bilateral feet/foot. Musculoskeletal: No pain with palpation or debridement of wound 5/5 muscle strength in/eversion and dorsi/plantarflexion bilateral feet. No gross instability noted.           Assessment:     Patient Active Problem List   Diagnosis    Atrial fibrillation ([facility]    Long term current use of anticoagulant therapy    Hypothyroidism    Hypercholesteremia    Choledocholithiasis with obstruction    History of cancer of gallbladder    Arthritis    Allergic rhinitis    Cardiac pacemaker in situ    DJD (degenerative joint disease) of cervical spine    DJD (degenerative joint disease) of thoracic spine    Puckering of left macula    CHB [facility]complete heart block) ([facility]    Diastolic dysfunction, left ventricle    DJD (degenerative joint disease), lumbosacral    Drusen of right macula    Dry eye syndrome    Encounter for long-term (current) use of other medications    Cellophane retinopathy    Female stress incontinence    Iron deficiency anemia    SSS (sick sinus syndrome) ([facility])    Venous (peripheral) insufficiency    Sleep disorder    Phlebitis and thrombophlebitis of other deep vessels of lower extremities    Pernicious anemia    Other pulmonary embolism and infarction    Osteoarthritis of lumbar spine    Keratoconjunctivitis sicca of both eyes not specified as Sjogren's    Environmental and seasonal allergies    Renal insufficiency    CKD (chronic kidney disease), stage III    Essential hypertension    Acute metabolic encephalopathy    Generalized weakness    Pneumonia    Chronic ulcer of right great toe with fat layer exposed ([facility]    Chronic ulcer of great toe of right foot, limited to breakdown of skin ([facility]       Procedure Note    Performed by: Sai Azar DPM    Consent obtained: Yes    Time out taken:  Yes    Pain Control: Anesthetic  Anesthetic: 4% Lidocaine Cream     Debridement:Excisional Debridement    Using curette the wound was sharply debrided    down through and including the removal of epidermis, dermis, subcutaneous tissue and muscle/fascia.         Devitalized Tissue Debrided:  fibrin, biofilm, slough, necrotic/eschar and exudate    Pre Debridement Measurements:  Are located in the Wound Documentation Flow Sheet    Wound #: 1     Wound Care Documentation:  Wound 02/18/21 Toe (Comment  which one) Right;Medial Great toe #1 (Active)   Wound Image   02/18/21 1414   Wound Etiology Pressure Stage  3 03/03/21 1427   Wound Cleansed Cleansed with saline 03/03/21 1427   Wound Length (cm) 0.2 cm 03/03/21 1427   Wound Width (cm) 0.2 cm 03/03/21 1427   Wound Depth (cm) 0.1 cm 03/03/21 1427   Wound Surface Area (cm^2) 0.04 cm^2 03/03/21 1427   Change in Wound Size % (l*w) 80 03/03/21 1427   Wound Volume (cm^3) 0 cm^3 03/03/21 1427   Wound Healing % 100 03/03/21 1427   Post-Procedure Length (cm) 0.2 cm 03/03/21 1443   Post-Procedure Width (cm) 0.2 cm 03/03/21 1443   Post-Procedure Depth (cm) 0.1 cm 03/03/21 1443   Post-Procedure Surface Area (cm^2) 0.04 cm^2 03/03/21 1443   Post-Procedure Volume (cm^3) 0 cm^3 03/03/21 1443   Wound Assessment Bleeding 03/03/21 1443   Drainage Amount Scant 03/03/21 1427   Drainage Description Yellow 03/03/21 1427   Odor None 03/03/21 1427   Addie-wound Assessment Fragile 03/03/21 1427   Margins Attached edges; Defined edges 03/03/21 1427   Wound Thickness Description not for Pressure Injury Full thickness 02/24/21 1433   Number of days: 13         Total Surface Area Debrided:  0.04 sq cm     Percentage of wound debrided 100%    Bleeding:  Minimal    Hemostasis Achieved:  by pressure    Procedural Pain:  0  / 10     Post Procedural Pain:  0 / 10     Response to treatment:  Well tolerated by patient. Plan:   Patient examined and evaluated  Wound without incident  Discussed offloading and checking all shoes for foreign objects due to the patient's neuropathy  Continue dressing changes every other day with collagen powder and gentamicin cream  Follow-up in 1 week  The nature of the patient's condition was explained in depth.  The patient was informed that their compliance to the treatment plan is paramount to successful healing and prevention of further ulceration and/or infection     Discharge Treatment      Written Patient Discharge Instructions Given            Electronically signed by Bailey Sierra DPM on 3/3/2021 at 4:39 PM

## 2021-03-03 NOTE — PLAN OF CARE
Discharge instructions given. Patient verbalized understanding. Return to [facility] in 1 week.

## 2021-03-05 ENCOUNTER — IMMUNIZATION (OUTPATIENT)
Dept: PRIMARY CARE CLINIC | Age: 86
End: 2021-03-05
Payer: MEDICARE

## 2021-03-05 PROCEDURE — 91301 COVID-19, MODERNA VACCINE 100MCG/0.5ML DOSE: CPT | Performed by: FAMILY MEDICINE

## 2021-03-05 PROCEDURE — 0012A COVID-19, MODERNA VACCINE 100MCG/0.5ML DOSE: CPT | Performed by: FAMILY MEDICINE

## 2021-03-10 ENCOUNTER — HOSPITAL ENCOUNTER (OUTPATIENT)
Dept: WOUND CARE | Age: 86
Discharge: HOME | End: 2021-03-10
Payer: MEDICARE

## 2021-03-10 VITALS
HEART RATE: 70 BPM | SYSTOLIC BLOOD PRESSURE: 132 MMHG | RESPIRATION RATE: 16 BRPM | DIASTOLIC BLOOD PRESSURE: 71 MMHG | TEMPERATURE: 97.3 F

## 2021-03-10 DIAGNOSIS — L97.512 CHRONIC ULCER OF RIGHT GREAT TOE WITH FAT LAYER EXPOSED (HCC): Primary | ICD-10-CM

## 2021-03-10 PROCEDURE — 11043 DBRDMT MUSC&/FSCA 1ST 20/<: CPT

## 2021-03-10 RX ORDER — LIDOCAINE HYDROCHLORIDE 20 MG/ML
JELLY TOPICAL ONCE
Status: CANCELLED | OUTPATIENT
Start: 2021-03-10 | End: 2021-03-10

## 2021-03-10 RX ORDER — LIDOCAINE HYDROCHLORIDE 40 MG/ML
SOLUTION TOPICAL ONCE
Status: CANCELLED | OUTPATIENT
Start: 2021-03-10 | End: 2021-03-10

## 2021-03-10 RX ORDER — LIDOCAINE 50 MG/G
OINTMENT TOPICAL ONCE
Status: CANCELLED | OUTPATIENT
Start: 2021-03-10 | End: 2021-03-10

## 2021-03-10 RX ORDER — CLOBETASOL PROPIONATE 0.5 MG/G
OINTMENT TOPICAL ONCE
Status: CANCELLED | OUTPATIENT
Start: 2021-03-10 | End: 2021-03-10

## 2021-03-10 RX ORDER — LIDOCAINE 40 MG/G
CREAM TOPICAL ONCE
Status: CANCELLED | OUTPATIENT
Start: 2021-03-10 | End: 2021-03-10

## 2021-03-10 RX ORDER — GENTAMICIN SULFATE 1 MG/G
OINTMENT TOPICAL ONCE
Status: CANCELLED | OUTPATIENT
Start: 2021-03-10 | End: 2021-03-10

## 2021-03-10 RX ORDER — LIDOCAINE 40 MG/G
CREAM TOPICAL ONCE
Status: DISCONTINUED | OUTPATIENT
Start: 2021-03-10 | End: 2021-03-11 | Stop reason: HOSPADM

## 2021-03-10 RX ORDER — BETAMETHASONE DIPROPIONATE 0.05 %
OINTMENT (GRAM) TOPICAL ONCE
Status: CANCELLED | OUTPATIENT
Start: 2021-03-10 | End: 2021-03-10

## 2021-03-10 RX ORDER — BACITRACIN ZINC AND POLYMYXIN B SULFATE 500; 1000 [USP'U]/G; [USP'U]/G
OINTMENT TOPICAL ONCE
Status: CANCELLED | OUTPATIENT
Start: 2021-03-10 | End: 2021-03-10

## 2021-03-10 RX ORDER — BACITRACIN, NEOMYCIN, POLYMYXIN B 400; 3.5; 5 [USP'U]/G; MG/G; [USP'U]/G
OINTMENT TOPICAL ONCE
Status: CANCELLED | OUTPATIENT
Start: 2021-03-10 | End: 2021-03-10

## 2021-03-10 RX ORDER — GINSENG 100 MG
CAPSULE ORAL ONCE
Status: CANCELLED | OUTPATIENT
Start: 2021-03-10 | End: 2021-03-10

## 2021-03-10 NOTE — PROGRESS NOTES
Allyson Laura  Progress Note and Procedure Note      Amina Toth  AGE: 80 y.o. GENDER: female  : 3/31/1927  TODAY'S DATE:  3/10/2021    Subjective:     Chief Complaint   Patient presents with    Wound Check     Right great toe         HISTORY of PRESENT ILLNESS HPI     Amina Toth is a 80 y.o. female who presents today for wound evaluation. History of Wound: Patient examined and evaluated. She is getting her dressing changed daily with help from family she has had no problems since her last visit. She notes some mild drainage from the wound still. . She denies nausea, vomiting, fever, chills, diarrhea, SOB and  CP. Wound Pain:  none  Severity:  0 / 10   Wound Type:  arterial and pressure  Modifying Factors:  chronic pressure, shear force, arterial insufficiency, decreased tissue oxygenation and Neuropathy  Associated Signs/Symptoms:  none and drainage        PAST MEDICAL HISTORY        Diagnosis Date    Atrial fibrillation (HCC)     Cancer (HCC)     gallbladder    Chronic ulcer of right great toe with fat layer exposed ([address] 2021    Hypertension     Hypothyroidism     Leg DVT (deep venous thromboembolism), acute ([address]     Osteoarthritis     PE (pulmonary embolism)     Scoliosis        PAST SURGICAL HISTORY    Past Surgical History:   Procedure Laterality Date   LORRI De Dios  6,85,0946    with cholangiogram    ERCP  2013    with Sphinchterotomy/Stone Removal    HYSTERECTOMY      PACEMAKER INSERTION      UPPER GASTROINTESTINAL ENDOSCOPY  10/21/2016    esophageal tear       FAMILY HISTORY    History reviewed. No pertinent family history.     SOCIAL HISTORY    Social History     Tobacco Use    Smoking status: Former Smoker     Quit date: 6/15/1965     Years since quittin.7    Smokeless tobacco: Never Used   Substance Use Topics    Alcohol use: No    Drug use: No       ALLERGIES    Allergies   Allergen Reactions    Demerol     Dilaudid [Hydromorphone Hcl]     Hydrochlorothiazide     Macrobid [Nitrofurantoin Monohydrate Macrocrystals]     Other      FRESH FROZEN PLASMA- CAUSED SWELLING OF EYES AND MOUTH, HIVES!  Sulfa Antibiotics     Tetanus Toxoids        MEDICATIONS    Current Outpatient Medications on File Prior to Encounter   Medication Sig Dispense Refill    gentamicin (GARAMYCIN) 0.1 % cream Apply topically daily. 1 Tube 0    tiZANidine (ZANAFLEX) 4 MG tablet 1 tab nightly and 1/2 tab during day prn muscle spasm/ tightness 45 tablet 1    traMADol (ULTRAM) 50 MG tablet Take 1-2 tablets by mouth every 6 hours as needed for Pain for up to 30 days. 240 tablet 2    furosemide (LASIX) 40 MG tablet TAKE 1 TABLET BY MOUTH  DAILY AS NEEDED FOR  SWELLING/ WEIGHT GAIN 90 tablet 1    levothyroxine (SYNTHROID) 75 MCG tablet TAKE 1 TABLET BY MOUTH  DAILY 90 tablet 3    ipratropium (ATROVENT) 0.06 % nasal spray SPRAY ONE TO TWO SPRAYS IN EACH NOSTRIL THREE TIMES A DAY AS NEEDED FOR RHINITIS 1 Bottle 2    losartan (COZAAR) 100 MG tablet TAKE ONE TABLET BY MOUTH ONCE NIGHTLY (Patient taking differently: 50 mg ) 90 tablet 4    sotalol (BETAPACE) 80 MG tablet TAKE ONE TABLET BY MOUTH TWICE A  tablet 3    spironolactone (ALDACTONE) 25 MG tablet Take 1 tablet by mouth daily 90 tablet 3    rivaroxaban (XARELTO) 15 MG TABS tablet TAKE 1 TABLET BY MOUTH  DAILY WITH BREAKFAST 90 tablet 3    omeprazole 20 MG EC tablet Take 20 mg by mouth daily      Multiple Vitamins-Minerals (THERAPEUTIC MULTIVITAMIN-MINERALS) tablet Take 1 tablet by mouth daily.  diphenhydrAMINE (BENADRYL) 25 MG tablet Take 25 mg by mouth nightly as needed. No current facility-administered medications on file prior to encounter. REVIEW OF SYSTEMS    Pertinent items are noted in HPI.       Objective:      /71   Pulse 70   Temp 97.3 °F (36.3 °C) (Infrared)   Resp 16     PHYSICAL EXAM    Vascular: Vascular status Impaired palpable pedal pulses, right DP1/4 and PT1/4, left DP1/4 and PT1/4. CFT 3 seconds digits 1 to 5 bilateral.  Hair growthAbsent  both lower extremities and feet. Skin temperature is warm to warm from pretibial area to distal digits bilateral.  Exam is negative for rubor, pallor, cyanosis or signs of acute vascular compromise bilaterally. Exam is negative for edema bilateral lower extremity. Varicosities Present bilateral lower extremity. Neuro: Neurologic status diminished bilateral with epicritic Absent  , proprioceptive Absent , vibratory sensationAbsent  and protopathicAbsent. DTRs Present bilateral Achilles. There were no reproducible neuritic symptoms on exam bilateral feet/ankles. Derm: Ulceration to right hallux. Ecchymosis Absent  bilateral feet/foot. Musculoskeletal: No pain with palpation or debridement of wound 5/5 muscle strength in/eversion and dorsi/plantarflexion bilateral feet. No gross instability noted.           Assessment:     Patient Active Problem List   Diagnosis    Atrial fibrillation ([facility]    Long term current use of anticoagulant therapy    Hypothyroidism    Hypercholesteremia    Choledocholithiasis with obstruction    History of cancer of gallbladder    Arthritis    Allergic rhinitis    Cardiac pacemaker in situ    DJD (degenerative joint disease) of cervical spine    DJD (degenerative joint disease) of thoracic spine    Puckering of left macula    CHB (complete heart block) ([facility])    Diastolic dysfunction, left ventricle    DJD (degenerative joint disease), lumbosacral    Drusen of right macula    Dry eye syndrome    Encounter for long-term (current) use of other medications    Cellophane retinopathy    Female stress incontinence    Iron deficiency anemia    SSS (sick sinus syndrome) ([facility])    Venous (peripheral) insufficiency    Sleep disorder    Phlebitis and thrombophlebitis of other deep vessels of lower extremities    Pernicious anemia    Other pulmonary embolism and infarction    Osteoarthritis of lumbar spine    Keratoconjunctivitis sicca of both eyes not specified as Sjogren's    Environmental and seasonal allergies    Renal insufficiency    CKD (chronic kidney disease), stage III    Essential hypertension    Acute metabolic encephalopathy    Generalized weakness    Pneumonia    Chronic ulcer of right great toe with fat layer exposed ([address]    Chronic ulcer of great toe of right foot, limited to breakdown of skin ([address]       Procedure Note    Performed by: Sapna Dupree DPM    Consent obtained: Yes    Time out taken:  Yes    Pain Control: Anesthetic  Anesthetic: 4% Lidocaine Cream     Debridement:Excisional Debridement    Using curette the wound was sharply debrided    down through and including the removal of epidermis, dermis, subcutaneous tissue and muscle/fascia.         Devitalized Tissue Debrided:  fibrin, biofilm, slough, necrotic/eschar and exudate    Pre Debridement Measurements:  Are located in the Wound Documentation Flow Sheet    Wound #: 1     Wound Care Documentation:  Wound 02/18/21 Toe (Comment  which one) Right;Medial Great toe #1 (Active)   Wound Image   02/18/21 1414   Wound Etiology Pressure Stage  3 03/10/21 1507   Wound Cleansed Cleansed with saline 03/10/21 1507   Wound Length (cm) 0.2 cm 03/10/21 1507   Wound Width (cm) 0.2 cm 03/10/21 1507   Wound Depth (cm) 0.1 cm 03/10/21 1507   Wound Surface Area (cm^2) 0.04 cm^2 03/10/21 1507   Change in Wound Size % (l*w) 80 03/10/21 1507   Wound Volume (cm^3) 0 cm^3 03/10/21 1507   Wound Healing % 100 03/10/21 1507   Post-Procedure Length (cm) 0.2 cm 03/10/21 1534   Post-Procedure Width (cm) 0.2 cm 03/10/21 1534   Post-Procedure Depth (cm) 0.1 cm 03/10/21 1534   Post-Procedure Surface Area (cm^2) 0.04 cm^2 03/10/21 1534   Post-Procedure Volume (cm^3) 0 cm^3 03/10/21 1534   Wound Assessment Pink/red 03/10/21 1534   Drainage Amount Scant 03/10/21 1507   Drainage Description Yellow 03/10/21 1507   Odor None 03/10/21 1507   Addie-wound Assessment Intact 03/10/21 1507   Margins Defined edges 03/10/21 1507   Wound Thickness Description not for Pressure Injury Full thickness 02/24/21 1433   Number of days: 20         Total Surface Area Debrided:  0.04 sq cm     Percentage of wound debrided 100%    Bleeding:  Minimal    Hemostasis Achieved:  by pressure    Procedural Pain:  0  / 10     Post Procedural Pain:  0 / 10     Response to treatment:  Well tolerated by patient. Plan:   Patient examined and evaluated  Wound without incident  Discussed offloading and checking all shoes for foreign objects due to the patient's neuropathy  But offloaded with quarter inch self-adhesive felt  Follow-up in 1 week  The nature of the patient's condition was explained in depth.  The patient was informed that their compliance to the treatment plan is paramount to successful healing and prevention of further ulceration and/or infection     Discharge Treatment  Daily dressing changes with collagen and gentamicin cream    Written Patient Discharge Instructions Given            Electronically signed by Farhad Torres DPM on 3/10/2021 at 4:49 PM

## 2021-03-10 NOTE — PLAN OF CARE
Discharge instructions given. Patient verbalized understanding. Return to [facility] in 1 week.

## 2021-03-17 ENCOUNTER — HOSPITAL ENCOUNTER (OUTPATIENT)
Dept: WOUND CARE | Age: 86
Discharge: HOME | End: 2021-03-17
Payer: MEDICARE

## 2021-03-17 VITALS — HEART RATE: 86 BPM | TEMPERATURE: 97.3 F | DIASTOLIC BLOOD PRESSURE: 79 MMHG | SYSTOLIC BLOOD PRESSURE: 139 MMHG

## 2021-03-17 DIAGNOSIS — L97.512 CHRONIC ULCER OF RIGHT GREAT TOE WITH FAT LAYER EXPOSED (HCC): Primary | ICD-10-CM

## 2021-03-17 PROCEDURE — 11043 DBRDMT MUSC&/FSCA 1ST 20/<: CPT

## 2021-03-17 RX ORDER — BACITRACIN, NEOMYCIN, POLYMYXIN B 400; 3.5; 5 [USP'U]/G; MG/G; [USP'U]/G
OINTMENT TOPICAL ONCE
Status: CANCELLED | OUTPATIENT
Start: 2021-03-17 | End: 2021-03-17

## 2021-03-17 RX ORDER — BACITRACIN ZINC AND POLYMYXIN B SULFATE 500; 1000 [USP'U]/G; [USP'U]/G
OINTMENT TOPICAL ONCE
Status: CANCELLED | OUTPATIENT
Start: 2021-03-17 | End: 2021-03-17

## 2021-03-17 RX ORDER — GINSENG 100 MG
CAPSULE ORAL ONCE
Status: CANCELLED | OUTPATIENT
Start: 2021-03-17 | End: 2021-03-17

## 2021-03-17 RX ORDER — CLOBETASOL PROPIONATE 0.5 MG/G
OINTMENT TOPICAL ONCE
Status: CANCELLED | OUTPATIENT
Start: 2021-03-17 | End: 2021-03-17

## 2021-03-17 RX ORDER — LIDOCAINE 40 MG/G
CREAM TOPICAL ONCE
Status: DISCONTINUED | OUTPATIENT
Start: 2021-03-17 | End: 2021-03-18 | Stop reason: HOSPADM

## 2021-03-17 RX ORDER — GENTAMICIN SULFATE 1 MG/G
OINTMENT TOPICAL ONCE
Status: CANCELLED | OUTPATIENT
Start: 2021-03-17 | End: 2021-03-17

## 2021-03-17 RX ORDER — BETAMETHASONE DIPROPIONATE 0.05 %
OINTMENT (GRAM) TOPICAL ONCE
Status: CANCELLED | OUTPATIENT
Start: 2021-03-17 | End: 2021-03-17

## 2021-03-17 RX ORDER — LIDOCAINE 40 MG/G
CREAM TOPICAL ONCE
Status: CANCELLED | OUTPATIENT
Start: 2021-03-17 | End: 2021-03-17

## 2021-03-17 RX ORDER — LIDOCAINE HYDROCHLORIDE 40 MG/ML
SOLUTION TOPICAL ONCE
Status: CANCELLED | OUTPATIENT
Start: 2021-03-17 | End: 2021-03-17

## 2021-03-17 RX ORDER — LIDOCAINE HYDROCHLORIDE 20 MG/ML
JELLY TOPICAL ONCE
Status: CANCELLED | OUTPATIENT
Start: 2021-03-17 | End: 2021-03-17

## 2021-03-17 RX ORDER — LIDOCAINE 50 MG/G
OINTMENT TOPICAL ONCE
Status: CANCELLED | OUTPATIENT
Start: 2021-03-17 | End: 2021-03-17

## 2021-03-17 NOTE — PLAN OF CARE
Discharge instructions given. Patient verbalized understanding. Return to [address] [place] [address] in 1 week.

## 2021-03-17 NOTE — PROGRESS NOTES
Allyson Laura  Progress Note and Procedure Note      Justo Gray  AGE: 80 y.o. GENDER: female  : 3/31/1927  TODAY'S DATE:  3/17/2021    Subjective:     Chief Complaint   Patient presents with    Wound Check     right lower extremity         HISTORY of PRESENT ILLNESS HPI     Justo Gray is a 80 y.o. female who presents today for wound evaluation. History of Wound: Patient examined and evaluated. She is getting her dressing changed daily with help from family she has had no problems since her last visit. Wound states that the drainage has been less. She states the toe gets no pressure. She denies nausea, vomiting, fever, chills, diarrhea, SOB and  CP. Wound Pain:  none  Severity:  0 / 10   Wound Type:  arterial and pressure  Modifying Factors:  chronic pressure, shear force, arterial insufficiency, decreased tissue oxygenation and Neuropathy  Associated Signs/Symptoms:  none and drainage        PAST MEDICAL HISTORY        Diagnosis Date    Atrial fibrillation (HCC)     Cancer (HCC)     gallbladder    Chronic ulcer of right great toe with fat layer exposed ([address] 2021    Hypertension     Hypothyroidism     Leg DVT (deep venous thromboembolism), acute ([address]     Osteoarthritis     PE (pulmonary embolism)     Scoliosis        PAST SURGICAL HISTORY    Past Surgical History:   Procedure Laterality Date   Uday Davies, LAPAROSCOPIC  3,69,7504    with cholangiogram    ERCP  2013    with Sphinchterotomy/Stone Removal    HYSTERECTOMY      PACEMAKER INSERTION      UPPER GASTROINTESTINAL ENDOSCOPY  10/21/2016    esophageal tear       FAMILY HISTORY    No family history on file.     SOCIAL HISTORY    Social History     Tobacco Use    Smoking status: Former Smoker     Quit date: 6/15/1965     Years since quittin.7    Smokeless tobacco: Never Used   Substance Use Topics    Alcohol use: No    Drug use: No       ALLERGIES    Allergies   Allergen Reactions    Demerol     Dilaudid [Hydromorphone Hcl]     Hydrochlorothiazide     Macrobid [Nitrofurantoin Monohydrate Macrocrystals]     Other      FRESH FROZEN PLASMA- CAUSED SWELLING OF EYES AND MOUTH, HIVES!  Sulfa Antibiotics     Tetanus Toxoids        MEDICATIONS    Current Outpatient Medications on File Prior to Encounter   Medication Sig Dispense Refill    gentamicin (GARAMYCIN) 0.1 % cream Apply topically daily. 1 Tube 0    tiZANidine (ZANAFLEX) 4 MG tablet 1 tab nightly and 1/2 tab during day prn muscle spasm/ tightness 45 tablet 1    traMADol (ULTRAM) 50 MG tablet Take 1-2 tablets by mouth every 6 hours as needed for Pain for up to 30 days. 240 tablet 2    furosemide (LASIX) 40 MG tablet TAKE 1 TABLET BY MOUTH  DAILY AS NEEDED FOR  SWELLING/ WEIGHT GAIN 90 tablet 1    levothyroxine (SYNTHROID) 75 MCG tablet TAKE 1 TABLET BY MOUTH  DAILY 90 tablet 3    ipratropium (ATROVENT) 0.06 % nasal spray SPRAY ONE TO TWO SPRAYS IN EACH NOSTRIL THREE TIMES A DAY AS NEEDED FOR RHINITIS 1 Bottle 2    losartan (COZAAR) 100 MG tablet TAKE ONE TABLET BY MOUTH ONCE NIGHTLY (Patient taking differently: 50 mg ) 90 tablet 4    sotalol (BETAPACE) 80 MG tablet TAKE ONE TABLET BY MOUTH TWICE A  tablet 3    spironolactone (ALDACTONE) 25 MG tablet Take 1 tablet by mouth daily 90 tablet 3    rivaroxaban (XARELTO) 15 MG TABS tablet TAKE 1 TABLET BY MOUTH  DAILY WITH BREAKFAST 90 tablet 3    omeprazole 20 MG EC tablet Take 20 mg by mouth daily      Multiple Vitamins-Minerals (THERAPEUTIC MULTIVITAMIN-MINERALS) tablet Take 1 tablet by mouth daily.  diphenhydrAMINE (BENADRYL) 25 MG tablet Take 25 mg by mouth nightly as needed. No current facility-administered medications on file prior to encounter. REVIEW OF SYSTEMS    Pertinent items are noted in HPI.       Objective:      /79   Pulse 86   Temp 97.3 °F (36.3 °C) (Infrared)     PHYSICAL EXAM    Vascular: Vascular status Impaired palpable pedal pulses, right DP1/4 and PT1/4, left DP1/4 and PT1/4. CFT 3 seconds digits 1 to 5 bilateral.  Hair growthAbsent  both lower extremities and feet. Skin temperature is warm to warm from pretibial area to distal digits bilateral.  Exam is negative for rubor, pallor, cyanosis or signs of acute vascular compromise bilaterally. Exam is negative for edema bilateral lower extremity. Varicosities Present bilateral lower extremity. Neuro: Neurologic status diminished bilateral with epicritic Absent  , proprioceptive Absent , vibratory sensation bsent  and protopathicAbsent. DTRs Present bilateral Achilles. There were no reproducible neuritic symptoms on exam bilateral feet/ankles. Derm: Ulceration to right hallux. Ecchymosis Absent  bilateral feet/foot. Musculoskeletal: No pain with palpation or debridement of wound 5/5 muscle strength in/eversion and dorsi/plantarflexion bilateral feet. No gross instability noted.           Assessment:     Patient Active Problem List   Diagnosis    Atrial fibrillation ([facility]    Long term current use of anticoagulant therapy    Hypothyroidism    Hypercholesteremia    Choledocholithiasis with obstruction    History of cancer of gallbladder    Arthritis    Allergic rhinitis    Cardiac pacemaker in situ    DJD (degenerative joint disease) of cervical spine    DJD (degenerative joint disease) of thoracic spine    Puckering of left macula    [facility]    Diastolic dysfunction, left ventricle    DJD (degenerative joint disease), lumbosacral    Drusen of right macula    Dry eye syndrome    Encounter for long-term (current) use of other medications    Cellophane retinopathy    Female stress incontinence    Iron deficiency anemia    SSS (sick sinus syndrome) ([facility]    Venous (peripheral) insufficiency    Sleep disorder    Phlebitis and thrombophlebitis of other deep vessels of lower extremities    Pernicious anemia    Other 03/17/21 1453   Wound Assessment Pink/red 03/17/21 1453   Drainage Amount None 03/17/21 1426   Drainage Description Yellow 03/17/21 1426   Odor None 03/17/21 1426   Addie-wound Assessment Intact 03/17/21 1426   Margins Attached edges 03/17/21 1426   Wound Thickness Description not for Pressure Injury Full thickness 03/17/21 1426   Number of days: 27         Total Surface Area Debrided:  0.05sq cm     Percentage of wound debrided 100%    Bleeding:  Minimal    Hemostasis Achieved:  by pressure    Procedural Pain:  0  / 10     Post Procedural Pain:  0 / 10     Response to treatment:  Well tolerated by patient. Plan:   Patient examined and evaluated  Wound without incident  Discussed offloading and checking all shoes for foreign objects due to the patient's neuropathy  Offloaded with quarter inch self-adhesive felt  Follow-up in 1 week  The nature of the patient's condition was explained in depth.  The patient was informed that their compliance to the treatment plan is paramount to successful healing and prevention of further ulceration and/or infection     Discharge Treatment Wound 02/18/21 Toe (Comment  which one) Right;Medial Great toe #1-Dressing/Treatment: Antibacterial ointment, Dry dressingDaily dressing changes with collagen and gentamicin cream    Written Patient Discharge Instructions Given            Electronically signed by Chago Cutler DPM on 3/17/2021 at 4:00 PM

## 2021-03-30 DIAGNOSIS — L20.9 ATOPIC DERMATITIS, UNSPECIFIED TYPE: ICD-10-CM

## 2021-03-31 ENCOUNTER — HOSPITAL ENCOUNTER (OUTPATIENT)
Dept: WOUND CARE | Age: 86
Discharge: HOME | End: 2021-03-31
Payer: MEDICARE

## 2021-03-31 DIAGNOSIS — L97.512 CHRONIC ULCER OF RIGHT GREAT TOE WITH FAT LAYER EXPOSED (HCC): Primary | ICD-10-CM

## 2021-03-31 PROCEDURE — 11043 DBRDMT MUSC&/FSCA 1ST 20/<: CPT

## 2021-03-31 RX ORDER — LIDOCAINE HYDROCHLORIDE 20 MG/ML
JELLY TOPICAL ONCE
Status: CANCELLED | OUTPATIENT
Start: 2021-03-31 | End: 2021-03-31

## 2021-03-31 RX ORDER — BETAMETHASONE DIPROPIONATE 0.05 %
OINTMENT (GRAM) TOPICAL ONCE
Status: CANCELLED | OUTPATIENT
Start: 2021-03-31 | End: 2021-03-31

## 2021-03-31 RX ORDER — GENTAMICIN SULFATE 1 MG/G
OINTMENT TOPICAL ONCE
Status: CANCELLED | OUTPATIENT
Start: 2021-03-31 | End: 2021-03-31

## 2021-03-31 RX ORDER — LIDOCAINE 40 MG/G
CREAM TOPICAL ONCE
Status: CANCELLED | OUTPATIENT
Start: 2021-03-31 | End: 2021-03-31

## 2021-03-31 RX ORDER — LIDOCAINE HYDROCHLORIDE 40 MG/ML
SOLUTION TOPICAL ONCE
Status: CANCELLED | OUTPATIENT
Start: 2021-03-31 | End: 2021-03-31

## 2021-03-31 RX ORDER — LIDOCAINE 50 MG/G
OINTMENT TOPICAL ONCE
Status: CANCELLED | OUTPATIENT
Start: 2021-03-31 | End: 2021-03-31

## 2021-03-31 RX ORDER — BACITRACIN, NEOMYCIN, POLYMYXIN B 400; 3.5; 5 [USP'U]/G; MG/G; [USP'U]/G
OINTMENT TOPICAL ONCE
Status: CANCELLED | OUTPATIENT
Start: 2021-03-31 | End: 2021-03-31

## 2021-03-31 RX ORDER — LIDOCAINE 40 MG/G
CREAM TOPICAL ONCE
Status: DISCONTINUED | OUTPATIENT
Start: 2021-03-31 | End: 2021-04-01 | Stop reason: HOSPADM

## 2021-03-31 RX ORDER — BACITRACIN ZINC AND POLYMYXIN B SULFATE 500; 1000 [USP'U]/G; [USP'U]/G
OINTMENT TOPICAL ONCE
Status: CANCELLED | OUTPATIENT
Start: 2021-03-31 | End: 2021-03-31

## 2021-03-31 RX ORDER — CLOBETASOL PROPIONATE 0.5 MG/G
OINTMENT TOPICAL ONCE
Status: CANCELLED | OUTPATIENT
Start: 2021-03-31 | End: 2021-03-31

## 2021-03-31 RX ORDER — GINSENG 100 MG
CAPSULE ORAL ONCE
Status: CANCELLED | OUTPATIENT
Start: 2021-03-31 | End: 2021-03-31

## 2021-03-31 NOTE — PROGRESS NOTES
Allyson Laura  Progress Note and Procedure Note      Mane Taylor  AGE: 80 y.o. GENDER: female  : 3/31/1927  TODAY'S DATE:  3/31/2021    Subjective:     Chief Complaint   Patient presents with    Wound Check     right great toe         HISTORY of PRESENT ILLNESS HPI     Mane Taylor is a 80 y.o. female who presents today for wound evaluation. History of Wound: Patient examined and evaluated. She is getting her dressing changed daily with help from family she has had no problems since her last visit. Wound states that the drainage has been less. She states the toe gets no pressure. She denies nausea, vomiting, fever, chills, diarrhea, SOB and  CP. Wound Pain:  none  Severity:  0 / 10   Wound Type:  arterial and pressure  Modifying Factors:  chronic pressure, shear force, arterial insufficiency, decreased tissue oxygenation and Neuropathy  Associated Signs/Symptoms:  none and drainage        PAST MEDICAL HISTORY        Diagnosis Date    Atrial fibrillation (HCC)     Cancer (HCC)     gallbladder    Chronic ulcer of right great toe with fat layer exposed ([address] 2021    Hypertension     Hypothyroidism     Leg DVT (deep venous thromboembolism), acute ([address]     Osteoarthritis     PE (pulmonary embolism)     Scoliosis        PAST SURGICAL HISTORY    Past Surgical History:   Procedure Laterality Date   Cosimo Morning, LAPAROSCOPIC  7,50,7024    with cholangiogram    ERCP  2013    with Sphinchterotomy/Stone Removal    HYSTERECTOMY      PACEMAKER INSERTION      UPPER GASTROINTESTINAL ENDOSCOPY  10/21/2016    esophageal tear       FAMILY HISTORY    History reviewed. No pertinent family history.     SOCIAL HISTORY    Social History     Tobacco Use    Smoking status: Former Smoker     Quit date: 6/15/1965     Years since quittin.8    Smokeless tobacco: Never Used   Substance Use Topics    Alcohol use: No    Drug use: No       ALLERGIES Allergies   Allergen Reactions    Demerol     Dilaudid [Hydromorphone Hcl]     Hydrochlorothiazide     Macrobid [Nitrofurantoin Monohydrate Macrocrystals]     Other      FRESH FROZEN PLASMA- CAUSED SWELLING OF EYES AND MOUTH, HIVES!  Sulfa Antibiotics     Tetanus Toxoids        MEDICATIONS    Current Outpatient Medications on File Prior to Encounter   Medication Sig Dispense Refill    gentamicin (GARAMYCIN) 0.1 % cream Apply topically daily. 1 Tube 0    tiZANidine (ZANAFLEX) 4 MG tablet 1 tab nightly and 1/2 tab during day prn muscle spasm/ tightness 45 tablet 1    traMADol (ULTRAM) 50 MG tablet Take 1-2 tablets by mouth every 6 hours as needed for Pain for up to 30 days. 240 tablet 2    furosemide (LASIX) 40 MG tablet TAKE 1 TABLET BY MOUTH  DAILY AS NEEDED FOR  SWELLING/ WEIGHT GAIN 90 tablet 1    levothyroxine (SYNTHROID) 75 MCG tablet TAKE 1 TABLET BY MOUTH  DAILY 90 tablet 3    ipratropium (ATROVENT) 0.06 % nasal spray SPRAY ONE TO TWO SPRAYS IN EACH NOSTRIL THREE TIMES A DAY AS NEEDED FOR RHINITIS 1 Bottle 2    losartan (COZAAR) 100 MG tablet TAKE ONE TABLET BY MOUTH ONCE NIGHTLY (Patient taking differently: 50 mg ) 90 tablet 4    sotalol (BETAPACE) 80 MG tablet TAKE ONE TABLET BY MOUTH TWICE A  tablet 3    spironolactone (ALDACTONE) 25 MG tablet Take 1 tablet by mouth daily 90 tablet 3    rivaroxaban (XARELTO) 15 MG TABS tablet TAKE 1 TABLET BY MOUTH  DAILY WITH BREAKFAST 90 tablet 3    omeprazole 20 MG EC tablet Take 20 mg by mouth daily      Multiple Vitamins-Minerals (THERAPEUTIC MULTIVITAMIN-MINERALS) tablet Take 1 tablet by mouth daily.  diphenhydrAMINE (BENADRYL) 25 MG tablet Take 25 mg by mouth nightly as needed. No current facility-administered medications on file prior to encounter. REVIEW OF SYSTEMS    Pertinent items are noted in HPI. Objective: There were no vitals taken for this visit.     PHYSICAL EXAM    Vascular: Vascular status Impaired palpable pedal pulses, right DP1/4 and PT1/4, left DP1/4 and PT1/4. CFT 3 seconds digits 1 to 5 bilateral.  Hair growthAbsent  both lower extremities and feet. Skin temperature is warm to warm from pretibial area to distal digits bilateral.  Exam is negative for rubor, pallor, cyanosis or signs of acute vascular compromise bilaterally. Exam is negative for edema bilateral lower extremity. Varicosities Present bilateral lower extremity. Neuro: Neurologic status diminished bilateral with epicritic Absent  , proprioceptive Absent , vibratory sensation bsent  and protopathicAbsent. DTRs Present bilateral Achilles. There were no reproducible neuritic symptoms on exam bilateral feet/ankles. Derm: Ulceration to right hallux. Ecchymosis Absent  bilateral feet/foot. Musculoskeletal: No pain with palpation or debridement of wound 5/5 muscle strength in/eversion and dorsi/plantarflexion bilateral feet. No gross instability noted.           Assessment:     Patient Active Problem List   Diagnosis    Atrial fibrillation ([facility]    Long term current use of anticoagulant therapy    Hypothyroidism    Hypercholesteremia    Choledocholithiasis with obstruction    History of cancer of gallbladder    Arthritis    Allergic rhinitis    Cardiac pacemaker in situ    DJD (degenerative joint disease) of cervical spine    DJD (degenerative joint disease) of thoracic spine    Puckering of left macula    [facility]    Diastolic dysfunction, left ventricle    DJD (degenerative joint disease), lumbosacral    Drusen of right macula    Dry eye syndrome    Encounter for long-term (current) use of other medications    Cellophane retinopathy    Female stress incontinence    Iron deficiency anemia    SSS (sick sinus syndrome) ([facility])    Venous (peripheral) insufficiency    Sleep disorder    Phlebitis and thrombophlebitis of other deep vessels of lower extremities    Pernicious anemia    Other pulmonary embolism and infarction    Osteoarthritis of lumbar spine    Keratoconjunctivitis sicca of both eyes not specified as Sjogren's    Environmental and seasonal allergies    Renal insufficiency    CKD (chronic kidney disease), stage III    Essential hypertension    Acute metabolic encephalopathy    Generalized weakness    Pneumonia    Chronic ulcer of right great toe with fat layer exposed ([address]    Chronic ulcer of great toe of right foot, limited to breakdown of skin ([address]       Procedure Note    Performed by: Nasim Varela DPM    Consent obtained: Yes    Time out taken:  Yes    Pain Control: Anesthetic  Anesthetic: 4% Lidocaine Cream     Debridement:Excisional Debridement    Using curette the wound was sharply debrided    down through and including the removal of epidermis, dermis, subcutaneous tissue and muscle/fascia.         Devitalized Tissue Debrided:  fibrin, biofilm, slough, necrotic/eschar and exudate    Pre Debridement Measurements:  Are located in the Wound Documentation Flow Sheet    Wound #: 1     Wound Care Documentation:  Wound 02/18/21 Toe (Comment  which one) Right;Medial Great toe #1 (Active)   Wound Image   02/18/21 1414   Wound Etiology Pressure Stage  3 03/31/21 1435   Dressing Status New dressing applied 03/31/21 1534   Wound Cleansed Cleansed with saline 03/31/21 1435   Dressing/Treatment Antibacterial ointment;Dry dressing 03/31/21 1534   Wound Length (cm) 0.2 cm 03/31/21 1435   Wound Width (cm) 0.2 cm 03/31/21 1435   Wound Depth (cm) 0.1 cm 03/31/21 1435   Wound Surface Area (cm^2) 0.04 cm^2 03/31/21 1435   Change in Wound Size % (l*w) 80 03/31/21 1435   Wound Volume (cm^3) 0 cm^3 03/31/21 1435   Wound Healing % 100 03/31/21 1435   Post-Procedure Length (cm) 0.2 cm 03/31/21 1459   Post-Procedure Width (cm) 0.2 cm 03/31/21 1459   Post-Procedure Depth (cm) 0.1 cm 03/31/21 1459   Post-Procedure Surface Area (cm^2) 0.04 cm^2 03/31/21 1459   Post-Procedure Volume (cm^3) 0 cm^3 03/31/21 1459   Wound Assessment Bleeding 03/31/21 1459   Drainage Amount None 03/31/21 1435   Drainage Description Yellow 03/17/21 1426   Odor None 03/31/21 1435   Addie-wound Assessment Intact 03/31/21 1435   Margins Attached edges 03/31/21 1435   Wound Thickness Description not for Pressure Injury Full thickness 03/17/21 1426   Number of days: 41       Total Surface Area Debrided:  0.04sq cm     Percentage of wound debrided 100%    Bleeding:  Minimal    Hemostasis Achieved:  by pressure    Procedural Pain:  0  / 10     Post Procedural Pain:  0 / 10     Response to treatment:  Well tolerated by patient. Plan:   Patient examined and evaluated  Wound without incident  And appropriate offloading  Follow-up in 1 week  The nature of the patient's condition was explained in depth.  The patient was informed that their compliance to the treatment plan is paramount to successful healing and prevention of further ulceration and/or infection     Discharge Treatment Wound 02/18/21 Toe (Comment  which one) Right;Medial Great toe #1-Dressing/Treatment: Antibacterial ointment, Dry dressingDaily dressing changes with collagen and gentamicin cream    Written Patient Discharge Instructions Given            Electronically signed by Kwadwo Funes DPM on 3/31/2021 at 4:29 PM

## 2021-03-31 NOTE — PLAN OF CARE
Discharge instructions given. Patient verbalized understanding. Return to [address] [place] [address],[address] in 1 week.

## 2021-04-02 NOTE — PROGRESS NOTES
plan.      Return in about 6 months (around 10/5/2021). The risks and benefits of my recommendations, as well as other treatment options, benefits and side effects werediscussed with the patient. All questions were answered. I reviewed patient's history, referral documents and electronic medical records  Copy of consult note is being routedelectronically/faxed to referring physician         MEDICATIONS  Current Outpatient Medications   Medication Sig Dispense Refill    gentamicin (GARAMYCIN) 0.1 % cream Apply topically daily. 1 Tube 0    tiZANidine (ZANAFLEX) 4 MG tablet 1 tab nightly and 1/2 tab during day prn muscle spasm/ tightness 45 tablet 1    traMADol (ULTRAM) 50 MG tablet Take 1-2 tablets by mouth every 6 hours as needed for Pain for up to 30 days. 240 tablet 2    furosemide (LASIX) 40 MG tablet TAKE 1 TABLET BY MOUTH  DAILY AS NEEDED FOR  SWELLING/ WEIGHT GAIN 90 tablet 1    levothyroxine (SYNTHROID) 75 MCG tablet TAKE 1 TABLET BY MOUTH  DAILY 90 tablet 3    ipratropium (ATROVENT) 0.06 % nasal spray SPRAY ONE TO TWO SPRAYS IN EACH NOSTRIL THREE TIMES A DAY AS NEEDED FOR RHINITIS 1 Bottle 2    losartan (COZAAR) 100 MG tablet TAKE ONE TABLET BY MOUTH ONCE NIGHTLY (Patient taking differently: 50 mg ) 90 tablet 4    sotalol (BETAPACE) 80 MG tablet TAKE ONE TABLET BY MOUTH TWICE A  tablet 3    spironolactone (ALDACTONE) 25 MG tablet Take 1 tablet by mouth daily 90 tablet 3    rivaroxaban (XARELTO) 15 MG TABS tablet TAKE 1 TABLET BY MOUTH  DAILY WITH BREAKFAST 90 tablet 3    omeprazole 20 MG EC tablet Take 20 mg by mouth daily      Multiple Vitamins-Minerals (THERAPEUTIC MULTIVITAMIN-MINERALS) tablet Take 1 tablet by mouth daily.  diphenhydrAMINE (BENADRYL) 25 MG tablet Take 25 mg by mouth nightly as needed. No current facility-administered medications for this visit.         ALLERGIES  Allergies   Allergen Reactions    Demerol     Dilaudid [Hydromorphone Hcl]     Hydrochlorothiazide     Macrobid [Nitrofurantoin Monohydrate Macrocrystals]     Other      FRESH FROZEN PLASMA- CAUSED SWELLING OF EYES AND MOUTH, HIVES!  Sulfa Antibiotics     Tetanus Toxoids          Comments  No specialty comments available. REFERRING PHYSICIAN:SABA Goldman - CNP    HISTORY OF PRESENT ILLNESS  Cyndi Roman is a 80 y.o. female with past medical history of atrial fibrillation, hypertension, hypothyroidism, DVT, pulmonary embolism, gallbladder cancer who is being seen for follow up evaluation of  left elbow pain and swelling. She presented with left elbow pain and swelling associated with erythema and warmth in November 2020. She denies any trauma or bug bite. She denies any preceding inciting event. She denies fever or swollen glands. Symptoms lasted for few weeks and then resolved on its own. She was given some prednisone which helped. She has not had any similar flareups in the past.  She has not had any new episodes since. She denies dactylitis, enthesitis, tenosynovitis, inflammatory bowel disease or inflammatory back pain or psoriasis. She has on and off pain in her knees and hands. She denies any swelling. Morning stiffness lasting for few minutes. Mother had arthritis does not know which kind. She had blood work by PCP that showed RF 30, normal ESR and uric acid. She had left elbow x-ray that was unremarkable. HPI  Review of Systems    REVIEW OF SYSTEMS:   Constitutional: No unanticipated weight loss or fevers. Integumentary: No rash, photosensitivity, malar rash, livedo reticularis, alopecia and Raynaud's symptoms, sclerodactyly, skin tightening  Eyes: negative for visual disturbance and persistent redness, discharge from eyes   ENT: - No tinnitus, loss of hearing, vertigo, or recurrent ear infections.  - No history of nasal/oral ulcers.   - No history of dry eyes/dry mouth  Cardiovascular: No history of pericarditis, chest pain or murmur or palpitations  Respiratory: No shortness of breath, cough or history of interstitial lung disease. No history of pleurisy. No history of tuberculosis or atypical infections. Gastrointestinal: No history of heart burn, dysphagia or esophageal dysmotility. No change in bowel habits or any inflammatory bowel disease. Genitourinary: No history renal disease, miscarriages. Hematologic/Lymphatic: No abnormal bruising or bleeding, blood clots or swollen lymph nodes. Neurological: No history of headaches, seizure or focal weakness. No history of neuropathies, paresthesias or hyperesthesias, facial droop, diplopia  Psychiatric: No history of bipolar disease, anxiety, depression  Endocrine: Denies any polyuria, polydipsia and osteoporosis  Allergic/Immunologic: No nasal congestion or hives. I have reviewed patients Past medical History, Social History and Family History as mentioned in her chart and this remains unchanged fromprevious. Past Medical History:   Diagnosis Date    Atrial fibrillation ([address]     Cancer (HCC)     gallbladder    Chronic ulcer of right great toe with fat layer exposed ([address] 2/18/2021    Hypertension     Hypothyroidism     Leg DVT (deep venous thromboembolism), acute ([address]     Osteoarthritis     PE (pulmonary embolism)     Scoliosis      Past Surgical History:   Procedure Laterality Date   Conor Tracy  9,21,0028    with cholangiogram    ERCP  6/17/2013    with Sphinchterotomy/Stone Removal    HYSTERECTOMY      PACEMAKER INSERTION      UPPER GASTROINTESTINAL ENDOSCOPY  10/21/2016    esophageal tear     Social History     Socioeconomic History    Marital status:       Spouse name: Not on file    Number of children: Not on file    Years of education: Not on file    Highest education level: Not on file   Occupational History    Not on file   Social Needs    Financial resource strain: Not on file    Food insecurity     Worry: Not on file Inability: Not on file    Transportation needs     Medical: Not on file     Non-medical: Not on file   Tobacco Use    Smoking status: Former Smoker     Quit date: 6/15/1965     Years since quittin.8    Smokeless tobacco: Never Used   Substance and Sexual Activity    Alcohol use: No    Drug use: No    Sexual activity: Not on file   Lifestyle    Physical activity     Days per week: Not on file     Minutes per session: Not on file    Stress: Not on file   Relationships    Social connections     Talks on phone: Not on file     Gets together: Not on file     Attends [employer] service: Not on file     Active member of club or organization: Not on file     Attends meetings of clubs or organizations: Not on file     Relationship status: Not on file    Intimate partner violence     Fear of current or ex partner: Not on file     Emotionally abused: Not on file     Physically abused: Not on file     Forced sexual activity: Not on file   Other Topics Concern    Not on file   Social History Narrative    Not on file     No family history on file.       PHYSICAL EXAM   Vitals:    21 1100   BP: 128/67   Pulse: 62   Temp: 97.4 °F (36.3 °C)   TempSrc: Temporal   Weight: 150 lb (68 kg)   Height: 5' 1\" (1.549 m)     Physical Exam  Constitutional:  Well developed, well nourished, no acute distress, non-toxic appearance   Musculoskeletal:   RIGHT  Swell  Tender  ROM  LEFT  Swell  Tender  ROM    DIP2  0  0  FULL   0  0  FULL    DIP3  0  0  FULL   0  0  FULL    DIP4  0  0  FULL   0  0  FULL    DIP5  0  0  FULL   0  0  FULL    PIP1  0  0  FULL   0  0  FULL    PIP2  0  0  FULL   0  0  FULL    PIP3  0  0  FULL   0  0  FULL    PIP4  0  0  FULL   0  0  FULL    PIP5  0  0  FULL   0  0  FULL    MCP1  0  0  FULL   0  0  FULL    MCP2  0  0  ud/bony changes  0  0  UD/bony change   MCP3  0  0  UD/bony changes  0  0  UD/bony change   MCP4  0  0  UD/bony change  0  0  FULL    MCP5  0  0  UD/bony changes  0  0  FULL    Wrist  0  0 FULL   0  0  FULL    Elbow  0  0  FULL   0  0  FULL    Shouldr  0  0  FULL   0  0  FULL    Hip  0  0  FULL   0  0  FULL    Knee  0  0   bony changes/valgus/crepitus  0  0   bony changes/valgus/crepitus   Ankle  0  0  FULL   0  0  FULL    MTP1  0  0  FULL   0  0  FULL    MTP2  0  0  FULL   0  0  FULL    MTP3  0  0  FULL   0  0  FULL    MTP4  0  0  FULL   0  0  FULL    MTP5  0  0  FULL   0  0  FULL    IP1  0  0  FULL   0  0  FULL    IP2  0  0  FULL   0  0  FULL    IP3  0  0  FULL   0  0  FULL    IP4  0  0  FULL   0  0  FULL    IP5  0  0  FULL   0 0 FULL     Squaring, bony enlargement of bilateral CMC joints  Ambulates without assistance, normal gait  Neck: Full ROM, no tenderness,supple   Back- no tenderness. Eyes:  PERRL, extra ocular movements intact, conjunctiva normal   HEENT:  Atraumatic, normocephalic, external ears normal, oropharynx moist, no pharyngeal exudates. Respiratory:  No respiratory distress  GI:  Soft, nondistended, normal bowel sounds, nontender, noorganomegaly, no mass, no rebound, no guarding   :  No costovertebral angle tenderness   Integument:  Well hydrated, no rash or telangiectasias  Lymphatic:  No lymphadenopathy noted   Neurologic:   Alert & oriented x 3, CN 2-12 normal, no focal deficits noted. Sensations Intact. Muscle strength 5/5 proximallyand distally in upper and lower extremities.    Psychiatric:  Speech and behavior appropriate           LABS AND IMAGING  Outside data reviewed and in HPI    Lab Results   Component Value Date    WBC 9.5 11/12/2020    RBC 4.92 11/12/2020    RBC 4.92 04/21/2016    HGB 14.8 11/12/2020    HCT 44.0 11/12/2020     11/12/2020    MCV 89.5 11/12/2020    MCH 30.0 11/12/2020    MCHC 33.5 11/12/2020    RDW 14.1 11/12/2020    SEGSPCT 75.6 06/15/2013    BANDSPCT 0 04/20/2017    LYMPHOPCT 14.1 11/12/2020    LYMPHOPCT 22.9 04/21/2016    MONOPCT 8.1 11/12/2020    EOSPCT 1 04/20/2017    BASOPCT 0.3 11/12/2020    MONOSABS 0.8 11/12/2020    LYMPHSABS 1.3 11/12/2020    EOSABS 0.0 11/12/2020    BASOSABS 0.0 11/12/2020    DIFFTYPE Auto 06/15/2013       Chemistry        Component Value Date/Time     11/12/2020 1325    K 3.9 11/12/2020 1325    K 3.4 (L) 01/13/2020 0602    CL 99 11/12/2020 1325    CO2 29 11/12/2020 1325    BUN 22 (H) 11/12/2020 1325    CREATININE 1.1 11/12/2020 1325        Component Value Date/Time    CALCIUM 10.1 11/12/2020 1325    ALKPHOS 118 11/12/2020 1325    AST 24 11/12/2020 1325    ALT 12 11/12/2020 1325    BILITOT 0.7 11/12/2020 1325          Lab Results   Component Value Date    SEDRATE 21 11/12/2020     No results found for: CRP  No results found for: TAINA, SHOLA, SSA, SSB, C3, C4  Lab Results   Component Value Date    RF 30.0 11/12/2020     No results found for: TAINA, ANATITER, ANAINT, PATH  No results found for: DSDNAG, DSDNAIGGIFA  No results found for: SSAROAB, SSALAAB  No results found for: SMAB, RNPAB  No results found for: CENTABIGG  No results found for: C3, C4, ACE  Lab Results   Component Value Date    VITD25 35.9 09/12/2017     Lab Results   Component Value Date    LABURIC 5.2 11/12/2020     Lab Results   Component Value Date    Gaila Denise 797 11/07/2019     Lab Results   Component Value Date    TSH 0.74 01/12/2020     Lab Results   Component Value Date    VITD25 35.9 09/12/2017       Radiology:    Left elbow x-rays 11/12/2020     FINDINGS:   There is no evidence of fracture, malalignment, or other acute osseous   abnormality. No suspicious soft tissue abnormalities are identified.           Impression   No acute osseous abnormality. ######################################################################    I thank you for giving me theopportunity to participate in Neal Cords care. If you have any questions or concerns please feel free to contact me. I look forward to following  Akhil Whitney along with you.       Electronically signed by: Fallon Hernandez MD, 4/5/2021 11:45 AM    Documentation was done using voice recognition dragon software. Every effort was made to ensure accuracy;however, inadvertent unintentional computerized transcription errors may be present.

## 2021-04-05 ENCOUNTER — OFFICE VISIT (OUTPATIENT)
Dept: RHEUMATOLOGY | Age: 86
End: 2021-04-05
Payer: MEDICARE

## 2021-04-05 ENCOUNTER — TELEPHONE (OUTPATIENT)
Dept: FAMILY MEDICINE CLINIC | Age: 86
End: 2021-04-05

## 2021-04-05 VITALS
DIASTOLIC BLOOD PRESSURE: 67 MMHG | HEIGHT: 61 IN | WEIGHT: 150 LBS | BODY MASS INDEX: 28.32 KG/M2 | HEART RATE: 62 BPM | SYSTOLIC BLOOD PRESSURE: 128 MMHG | TEMPERATURE: 97.4 F

## 2021-04-05 DIAGNOSIS — M25.422 PAIN AND SWELLING OF ELBOW, LEFT: Primary | ICD-10-CM

## 2021-04-05 DIAGNOSIS — Z11.59 ENCOUNTER FOR SCREENING FOR OTHER VIRAL DISEASES: ICD-10-CM

## 2021-04-05 DIAGNOSIS — R76.8 ELEVATED RHEUMATOID FACTOR: ICD-10-CM

## 2021-04-05 DIAGNOSIS — L20.9 ATOPIC DERMATITIS, UNSPECIFIED TYPE: ICD-10-CM

## 2021-04-05 DIAGNOSIS — M25.422 PAIN AND SWELLING OF ELBOW, LEFT: ICD-10-CM

## 2021-04-05 DIAGNOSIS — M25.522 PAIN AND SWELLING OF ELBOW, LEFT: ICD-10-CM

## 2021-04-05 DIAGNOSIS — M25.522 PAIN AND SWELLING OF ELBOW, LEFT: Primary | ICD-10-CM

## 2021-04-05 DIAGNOSIS — M15.9 PRIMARY OSTEOARTHRITIS INVOLVING MULTIPLE JOINTS: ICD-10-CM

## 2021-04-05 PROCEDURE — 1090F PRES/ABSN URINE INCON ASSESS: CPT | Performed by: INTERNAL MEDICINE

## 2021-04-05 PROCEDURE — 1123F ACP DISCUSS/DSCN MKR DOCD: CPT | Performed by: INTERNAL MEDICINE

## 2021-04-05 PROCEDURE — G8417 CALC BMI ABV UP PARAM F/U: HCPCS | Performed by: INTERNAL MEDICINE

## 2021-04-05 PROCEDURE — 4040F PNEUMOC VAC/ADMIN/RCVD: CPT | Performed by: INTERNAL MEDICINE

## 2021-04-05 PROCEDURE — 1036F TOBACCO NON-USER: CPT | Performed by: INTERNAL MEDICINE

## 2021-04-05 PROCEDURE — G8427 DOCREV CUR MEDS BY ELIG CLIN: HCPCS | Performed by: INTERNAL MEDICINE

## 2021-04-05 PROCEDURE — 99204 OFFICE O/P NEW MOD 45 MIN: CPT | Performed by: INTERNAL MEDICINE

## 2021-04-05 NOTE — TELEPHONE ENCOUNTER
Request was sent for patients   triamcinolone (KENALOG) 0.1 % ointment [[id number]]  ENDED    Order Details  Dose: -- Route: Topical Frequency: 2 TIMES DAILY   Dispense Quantity: 1 Tube Refills: 0          Sig: Apply topically 2 times daily for 7 days         Start Date: 20 End Date: 20 after 14 doses   Written Date: 20 Expiration Date: 21       Diagnosis Association: Atopic dermatitis, unspecified type (L20.9)   Providers    Authorizing Provider: SABA Purvis CNP NPI: [id number]   Ordering User:  SABA Purvis [id number] [facility], [address] [place] [address] [address] [address]   Phone:  [phone]  Fax:  [phone]   Order Class:    Order Class   Normal [1]   Warnings History    No Interaction Warnings Shown    triamcinolone (KENALOG) 0.1 % ointment    Date: 2020 Department: [facility] Ordering/Authorizing: SABA Purvis CNP   Outpatient Medication Detail     Disp Refills     This was denied and patient / family was not notified. Why is this being denied?

## 2021-04-05 NOTE — TELEPHONE ENCOUNTER
Since it is something that is typically applied for 7-14 days, the LPN probably felt that this required an appointment for further evaluation even though this can be a recurrent issue. She would not know that this is fine to be refilled. I sent the refill to [facility].

## 2021-04-06 LAB
C-REACTIVE PROTEIN: <3 MG/L (ref 0–5.1)
CYCLIC CITRULLINATED PEPTIDE ANTIBODY IGG: <0.5 U/ML (ref 0–2.9)
HEPATITIS B CORE IGM ANTIBODY: NORMAL
HEPATITIS B SURFACE ANTIGEN INTERPRETATION: NORMAL
HEPATITIS C ANTIBODY INTERPRETATION: NORMAL
SEDIMENTATION RATE, ERYTHROCYTE: 8 MM/HR (ref 0–30)
URIC ACID, SERUM: 5.5 MG/DL (ref 2.6–6)

## 2021-04-07 LAB
ALBUMIN SERPL-MCNC: 3.5 G/DL (ref 3.1–4.9)
ALPHA-1-GLOBULIN: 0.3 G/DL (ref 0.2–0.4)
ALPHA-2-GLOBULIN: 0.8 G/DL (ref 0.4–1.1)
BETA GLOBULIN: 1.2 G/DL (ref 0.9–1.6)
GAMMA GLOBULIN: 0.9 G/DL (ref 0.6–1.8)
SPE/IFE INTERPRETATION: NORMAL
TOTAL PROTEIN: 6.6 G/DL (ref 6.4–8.2)

## 2021-04-08 DIAGNOSIS — R76.8 POSITIVE ANA (ANTINUCLEAR ANTIBODY): Primary | ICD-10-CM

## 2021-04-08 LAB
ANA PATTERN: ABNORMAL
ANA TITER: ABNORMAL
ANTINUCLEAR AB INTERPRETIVE COMMENT: ABNORMAL
ANTINUCLEAR ANTIBODY, HEP-2, IGG: DETECTED

## 2021-04-14 ENCOUNTER — HOSPITAL ENCOUNTER (OUTPATIENT)
Dept: GENERAL RADIOLOGY | Age: 86
Discharge: HOME | End: 2021-04-14
Payer: MEDICARE

## 2021-04-14 ENCOUNTER — HOSPITAL ENCOUNTER (OUTPATIENT)
Dept: WOUND CARE | Age: 86
Discharge: HOME | End: 2021-04-14
Payer: MEDICARE

## 2021-04-14 ENCOUNTER — HOSPITAL ENCOUNTER (OUTPATIENT)
Age: 86
Discharge: HOME | End: 2021-04-14
Payer: MEDICARE

## 2021-04-14 VITALS — DIASTOLIC BLOOD PRESSURE: 59 MMHG | HEART RATE: 63 BPM | RESPIRATION RATE: 16 BRPM | SYSTOLIC BLOOD PRESSURE: 123 MMHG

## 2021-04-14 DIAGNOSIS — M12.9 ARTHROPATHY: ICD-10-CM

## 2021-04-14 DIAGNOSIS — R53.83 OTHER FATIGUE: ICD-10-CM

## 2021-04-14 DIAGNOSIS — M15.9 PRIMARY OSTEOARTHRITIS INVOLVING MULTIPLE JOINTS: ICD-10-CM

## 2021-04-14 DIAGNOSIS — L97.512 CHRONIC ULCER OF RIGHT GREAT TOE WITH FAT LAYER EXPOSED (HCC): Primary | ICD-10-CM

## 2021-04-14 DIAGNOSIS — R76.8 POSITIVE ANA (ANTINUCLEAR ANTIBODY): ICD-10-CM

## 2021-04-14 DIAGNOSIS — M11.20 CHONDROCALCINOSIS: Primary | ICD-10-CM

## 2021-04-14 DIAGNOSIS — M11.20 CHONDROCALCINOSIS: ICD-10-CM

## 2021-04-14 LAB
FERRITIN: 43.3 NG/ML (ref 15–150)
IRON: 141 UG/DL (ref 37–145)
MAGNESIUM: 2.3 MG/DL (ref 1.8–2.4)
PARATHYROID HORMONE INTACT: 44.7 PG/ML (ref 14–72)
PHOSPHORUS: 3.5 MG/DL (ref 2.5–4.9)
TOTAL IRON BINDING CAPACITY: 346 UG/DL (ref 260–445)
TSH REFLEX FT4: 0.83 UIU/ML (ref 0.27–4.2)

## 2021-04-14 PROCEDURE — 73130 X-RAY EXAM OF HAND: CPT

## 2021-04-14 PROCEDURE — 73562 X-RAY EXAM OF KNEE 3: CPT

## 2021-04-14 PROCEDURE — 83540 ASSAY OF IRON: CPT

## 2021-04-14 PROCEDURE — 83970 ASSAY OF PARATHORMONE: CPT

## 2021-04-14 PROCEDURE — 99212 OFFICE O/P EST SF 10 MIN: CPT

## 2021-04-14 PROCEDURE — 84443 ASSAY THYROID STIM HORMONE: CPT

## 2021-04-14 PROCEDURE — 86225 DNA ANTIBODY NATIVE: CPT

## 2021-04-14 PROCEDURE — 84100 ASSAY OF PHOSPHORUS: CPT

## 2021-04-14 PROCEDURE — 36415 COLL VENOUS BLD VENIPUNCTURE: CPT

## 2021-04-14 PROCEDURE — 83550 IRON BINDING TEST: CPT

## 2021-04-14 PROCEDURE — 83735 ASSAY OF MAGNESIUM: CPT

## 2021-04-14 PROCEDURE — 82728 ASSAY OF FERRITIN: CPT

## 2021-04-14 PROCEDURE — 86235 NUCLEAR ANTIGEN ANTIBODY: CPT

## 2021-04-14 RX ORDER — LIDOCAINE 50 MG/G
OINTMENT TOPICAL ONCE
Status: CANCELLED | OUTPATIENT
Start: 2021-04-14 | End: 2021-04-14

## 2021-04-14 RX ORDER — GINSENG 100 MG
CAPSULE ORAL ONCE
Status: CANCELLED | OUTPATIENT
Start: 2021-04-14 | End: 2021-04-14

## 2021-04-14 RX ORDER — LIDOCAINE HYDROCHLORIDE 40 MG/ML
SOLUTION TOPICAL ONCE
Status: CANCELLED | OUTPATIENT
Start: 2021-04-14 | End: 2021-04-14

## 2021-04-14 RX ORDER — CLOBETASOL PROPIONATE 0.5 MG/G
OINTMENT TOPICAL ONCE
Status: CANCELLED | OUTPATIENT
Start: 2021-04-14 | End: 2021-04-14

## 2021-04-14 RX ORDER — LIDOCAINE HYDROCHLORIDE 20 MG/ML
JELLY TOPICAL ONCE
Status: CANCELLED | OUTPATIENT
Start: 2021-04-14 | End: 2021-04-14

## 2021-04-14 RX ORDER — BACITRACIN, NEOMYCIN, POLYMYXIN B 400; 3.5; 5 [USP'U]/G; MG/G; [USP'U]/G
OINTMENT TOPICAL ONCE
Status: CANCELLED | OUTPATIENT
Start: 2021-04-14 | End: 2021-04-14

## 2021-04-14 RX ORDER — LIDOCAINE 40 MG/G
CREAM TOPICAL ONCE
Status: DISCONTINUED | OUTPATIENT
Start: 2021-04-14 | End: 2021-04-15 | Stop reason: HOSPADM

## 2021-04-14 RX ORDER — BETAMETHASONE DIPROPIONATE 0.05 %
OINTMENT (GRAM) TOPICAL ONCE
Status: CANCELLED | OUTPATIENT
Start: 2021-04-14 | End: 2021-04-14

## 2021-04-14 RX ORDER — GENTAMICIN SULFATE 1 MG/G
OINTMENT TOPICAL ONCE
Status: CANCELLED | OUTPATIENT
Start: 2021-04-14 | End: 2021-04-14

## 2021-04-14 RX ORDER — BACITRACIN ZINC AND POLYMYXIN B SULFATE 500; 1000 [USP'U]/G; [USP'U]/G
OINTMENT TOPICAL ONCE
Status: CANCELLED | OUTPATIENT
Start: 2021-04-14 | End: 2021-04-14

## 2021-04-14 RX ORDER — LIDOCAINE 40 MG/G
CREAM TOPICAL ONCE
Status: CANCELLED | OUTPATIENT
Start: 2021-04-14 | End: 2021-04-14

## 2021-04-14 NOTE — PROGRESS NOTES
Allyson Laura  Progress Note and Procedure Note      Sariah Belcher  AGE: 80 y.o. GENDER: female  : 3/31/1927  TODAY'S DATE:  2021    Subjective:     Chief Complaint   Patient presents with    Wound Check     right foot         HISTORY of PRESENT ILLNESS HPI     Sariah Belcher is a 80 y.o. female who presents today for wound evaluation. History of Wound: Patient examined and evaluated. She is getting her dressing changed daily with help from family she has had no problems since her last visit. Wound states that the drainage has been less. She states the toe gets no pressure. She denies nausea, vomiting, fever, chills, diarrhea, SOB and  CP. Wound Pain:  none  Severity:  0 / 10   Wound Type:  arterial and pressure  Modifying Factors:  chronic pressure, shear force, arterial insufficiency, decreased tissue oxygenation and Neuropathy  Associated Signs/Symptoms:  none and drainage        PAST MEDICAL HISTORY        Diagnosis Date    Atrial fibrillation (HCC)     Cancer (HCC)     gallbladder    Chronic ulcer of right great toe with fat layer exposed ([address] 2021    Hypertension     Hypothyroidism     Leg DVT (deep venous thromboembolism), acute ([address]     Osteoarthritis     PE (pulmonary embolism)     Scoliosis        PAST SURGICAL HISTORY    Past Surgical History:   Procedure Laterality Date   Juliette Pathak, LAPAROSCOPIC  6,83,8558    with cholangiogram    ERCP  2013    with Sphinchterotomy/Stone Removal    HYSTERECTOMY      PACEMAKER INSERTION      UPPER GASTROINTESTINAL ENDOSCOPY  10/21/2016    esophageal tear       FAMILY HISTORY    History reviewed. No pertinent family history.     SOCIAL HISTORY    Social History     Tobacco Use    Smoking status: Former Smoker     Quit date: 6/15/1965     Years since quittin.8    Smokeless tobacco: Never Used   Substance Use Topics    Alcohol use: No    Drug use: No       ALLERGIES    Allergies pedal pulses, right DP1/4 and PT1/4, left DP1/4 and PT1/4. CFT 3 seconds digits 1 to 5 bilateral.  Hair growthAbsent  both lower extremities and feet. Skin temperature is warm to warm from pretibial area to distal digits bilateral.  Exam is negative for rubor, pallor, cyanosis or signs of acute vascular compromise bilaterally. Exam is negative for edema bilateral lower extremity. Varicosities Present bilateral lower extremity. Neuro: Neurologic status diminished bilateral with epicritic Absent  , proprioceptive Absent , vibratory sensation bsent  and protopathicAbsent. DTRs Present bilateral Achilles. There were no reproducible neuritic symptoms on exam bilateral feet/ankles. Derm: Ulceration to right hallux. Ecchymosis Absent  bilateral feet/foot. Musculoskeletal: No pain with palpation or debridement of wound 5/5 muscle strength in/eversion and dorsi/plantarflexion bilateral feet. No gross instability noted.           Assessment:     Patient Active Problem List   Diagnosis    [facility]rial fibrillation [facility]    Long term current use of anticoagulant therapy    Hypothyroidism    Hypercholesteremia    Choledocholithiasis with obstruction    History of cancer of gallbladder    Arthritis    Allergic rhinitis    Cardiac pacemaker in situ    DJD (degenerative joint disease) of cervical spine    DJD (degenerative joint disease) of thoracic spine    Puckering of left macula    CHB [facility] block) [facility]    Diastolic dysfunction, left ventricle    DJD (degenerative joint disease), lumbosacral    Drusen of right macula    Dry eye syndrome    Encounter for long-term (current) use of other medications    Cellophane retinopathy    Female stress incontinence    Iron deficiency anemia    SSS (sick sinus syndrome) ([facility])    Venous (peripheral) insufficiency    Sleep disorder    Phlebitis and thrombophlebitis of other deep vessels of lower extremities    Pernicious anemia    Other pulmonary embolism and infarction    Osteoarthritis of lumbar spine    Keratoconjunctivitis sicca of both eyes not specified as Sjogren's    Environmental and seasonal allergies    Renal insufficiency    CKD (chronic kidney disease), stage III    Essential hypertension    Acute metabolic encephalopathy    Generalized weakness    Pneumonia    Chronic ulcer of right great toe with fat layer exposed ([address]    Chronic ulcer of great toe of right foot, limited to breakdown of skin ([address]         Wound #: 1     Wound Care Documentation:  Wound 02/18/21 Toe (Comment  which one) Right;Medial Great toe #1 (Active)   Wound Image   02/18/21 1414   Wound Etiology Pressure Stage  3 04/14/21 1356   Dressing Status New dressing applied 03/31/21 1534   Wound Cleansed Cleansed with saline 04/14/21 1356   Dressing/Treatment Antibacterial ointment;Dry dressing 03/31/21 1534   Wound Length (cm) 0 cm 04/14/21 1356   Wound Width (cm) 0 cm 04/14/21 1356   Wound Depth (cm) 0 cm 04/14/21 1356   Wound Surface Area (cm^2) 0 cm^2 04/14/21 1356   Change in Wound Size % (l*w) 100 04/14/21 1356   Wound Volume (cm^3) 0 cm^3 04/14/21 1356   Wound Healing % 100 04/14/21 1356   Post-Procedure Length (cm) 0.2 cm 03/31/21 1459   Post-Procedure Width (cm) 0.2 cm 03/31/21 1459   Post-Procedure Depth (cm) 0.1 cm 03/31/21 1459   Post-Procedure Surface Area (cm^2) 0.04 cm^2 03/31/21 1459   Post-Procedure Volume (cm^3) 0 cm^3 03/31/21 1459   Wound Assessment Pink/red;Epithelialization 04/14/21 1356   Drainage Amount None 04/14/21 1356   Drainage Description Yellow 03/17/21 1426   Odor None 03/31/21 1435   Addie-wound Assessment Intact 04/14/21 1356   Margins Attached edges 04/14/21 1356   Wound Thickness Description not for Pressure Injury Full thickness 03/17/21 1426   Number of days: 55         Plan:   Patient examined and evaluated  Wound healed  Continue appropriate offloading  Follow-up in 1 week  The nature of the patient's condition was explained in depth. The patient was informed that their compliance to the treatment plan is paramount to successful healing and prevention of further ulceration and/or infection     Discharge Treatment  Use Band-Aid daily for the next 2 weeks to see if there is any drainage if there is the need to call and follow-up.     Written Patient Discharge Instructions Given            Electronically signed by Sana Moore DPM on 4/14/2021 at 3:23 PM

## 2021-04-15 LAB
ANTI-DSDNA IGG: 1 IU/ML (ref 0–9)
ANTI-RNP IGG: <0.2 AI (ref 0–0.9)
ANTI-SMITH IGG: <0.2 AI (ref 0–0.9)
ANTI-SS-A IGG: <0.2 AI (ref 0–0.9)
ANTI-SS-B IGG: <0.2 AI (ref 0–0.9)

## 2021-04-20 ENCOUNTER — TELEPHONE (OUTPATIENT)
Dept: RHEUMATOLOGY | Age: 86
End: 2021-04-20

## 2021-04-20 NOTE — TELEPHONE ENCOUNTER
Kobe Schmitt, pt's son in law called. She had some labs drawn and then the office sent her to get some additional testing. He states that she is having some bad days and some good days. He is wondering if  can prescribe her something to help with her arthritis. She was seen but not given anything to help with her symptoms. Please advise    Pharmacy on file.

## 2021-04-21 NOTE — TELEPHONE ENCOUNTER
Please call and let her know that she can take over-the-counter Tylenol as needed for joint symptoms and use topical diclofenac gel as needed that is also over-the-counter. When I saw her her symptoms in the left elbow had resolved and she had occasional pain in her knees and hands.   If she continues to be symptomatic she should make a follow-up appointment to discuss further

## 2021-04-21 NOTE — TELEPHONE ENCOUNTER
Called and spoke with daughter. She states that her mother is in pain all the time. The pain is constant and it is moderate. She reports she has lost inches in height and her spine is crumbling. She has several concerns and feels this was not addressed. She asked her mother if she spoke with  about all her pains and issues. Pt advised that she was referred for arm pain only. Advised that I will schedule a follow up. Advised daughter that she needs to be present to address all questions and concerns. Scheduled in a cancellation spot.

## 2021-05-10 ENCOUNTER — OFFICE VISIT (OUTPATIENT)
Dept: RHEUMATOLOGY | Age: 86
End: 2021-05-10
Payer: MEDICARE

## 2021-05-10 VITALS
SYSTOLIC BLOOD PRESSURE: 130 MMHG | BODY MASS INDEX: 29.45 KG/M2 | WEIGHT: 150 LBS | HEART RATE: 76 BPM | RESPIRATION RATE: 16 BRPM | HEIGHT: 60 IN | OXYGEN SATURATION: 99 % | DIASTOLIC BLOOD PRESSURE: 82 MMHG

## 2021-05-10 DIAGNOSIS — R76.8 ELEVATED RHEUMATOID FACTOR: ICD-10-CM

## 2021-05-10 DIAGNOSIS — R76.8 POSITIVE ANA (ANTINUCLEAR ANTIBODY): ICD-10-CM

## 2021-05-10 DIAGNOSIS — M15.9 PRIMARY OSTEOARTHRITIS INVOLVING MULTIPLE JOINTS: Primary | ICD-10-CM

## 2021-05-10 DIAGNOSIS — M11.20 CHONDROCALCINOSIS: ICD-10-CM

## 2021-05-10 PROCEDURE — 99214 OFFICE O/P EST MOD 30 MIN: CPT | Performed by: INTERNAL MEDICINE

## 2021-05-10 PROCEDURE — 1123F ACP DISCUSS/DSCN MKR DOCD: CPT | Performed by: INTERNAL MEDICINE

## 2021-05-10 PROCEDURE — G8417 CALC BMI ABV UP PARAM F/U: HCPCS | Performed by: INTERNAL MEDICINE

## 2021-05-10 PROCEDURE — 20610 DRAIN/INJ JOINT/BURSA W/O US: CPT | Performed by: INTERNAL MEDICINE

## 2021-05-10 PROCEDURE — G8427 DOCREV CUR MEDS BY ELIG CLIN: HCPCS | Performed by: INTERNAL MEDICINE

## 2021-05-10 PROCEDURE — 1090F PRES/ABSN URINE INCON ASSESS: CPT | Performed by: INTERNAL MEDICINE

## 2021-05-10 PROCEDURE — 1036F TOBACCO NON-USER: CPT | Performed by: INTERNAL MEDICINE

## 2021-05-10 PROCEDURE — 4040F PNEUMOC VAC/ADMIN/RCVD: CPT | Performed by: INTERNAL MEDICINE

## 2021-05-10 RX ORDER — LIDOCAINE HYDROCHLORIDE 10 MG/ML
2 INJECTION, SOLUTION INFILTRATION; PERINEURAL ONCE
Status: COMPLETED | OUTPATIENT
Start: 2021-05-10 | End: 2021-05-10

## 2021-05-10 RX ORDER — TRIAMCINOLONE ACETONIDE 40 MG/ML
40 INJECTION, SUSPENSION INTRA-ARTICULAR; INTRAMUSCULAR ONCE
Status: COMPLETED | OUTPATIENT
Start: 2021-05-10 | End: 2021-05-10

## 2021-05-10 RX ADMIN — TRIAMCINOLONE ACETONIDE 40 MG: 40 INJECTION, SUSPENSION INTRA-ARTICULAR; INTRAMUSCULAR at 16:19

## 2021-05-10 RX ADMIN — LIDOCAINE HYDROCHLORIDE 2 ML: 10 INJECTION, SOLUTION INFILTRATION; PERINEURAL at 16:18

## 2021-05-10 RX ADMIN — TRIAMCINOLONE ACETONIDE 40 MG: 40 INJECTION, SUSPENSION INTRA-ARTICULAR; INTRAMUSCULAR at 16:18

## 2021-05-10 RX ADMIN — LIDOCAINE HYDROCHLORIDE 2 ML: 10 INJECTION, SOLUTION INFILTRATION; PERINEURAL at 16:17

## 2021-05-10 NOTE — PROGRESS NOTES
5/10/2021  Patient Name: Neal Rodriguez  : 3/31/1927  Medical Record: [id number]      ASSESSMENT AND PLAN    Assessment/Plan:      ASSESSMENT:    1. Primary osteoarthritis involving multiple joints    2. Chondrocalcinosis    3. Positive TAINA (antinuclear antibody)    4. Elevated rheumatoid factor        PLAN:     Akhil Whitney was seen today for pain. Diagnoses and all orders for this visit:    Primary osteoarthritis involving multiple joints  -     [place] ARTHROCENTESIS ASPIR&/INJ MAJOR JT/BURSA W/O US    Chondrocalcinosis    Positive TAINA (antinuclear antibody)    Elevated rheumatoid factor    Other orders  -     diclofenac sodium (VOLTAREN) 1 % GEL; Apply 4 grams to lower extremity joints and 2 grams to upper extremity joints as needed 4 times/day    Osteoarthritis/ Joint pain : Discussed about diagnosis and management of osteoarthritis. There are no FDA approved disease modifying agents. Goal is to control pain and increase mobility. Discussed the importance of wt loss, exercises, formal PT, joint braces/splints. First line of agent Tylenol arthritis, NSAIDs systemic and/ or topical,Cymbalta, pain meds, joint injections- steroids, and visco supplementaiton for knee OA. Also discussed about surgical options. Low titer positive RF, negative anti-CCP antibody, hand and knee x-rays with severe degenerative arthritis. No active synovitis on joint exam  Unable to take oral NSAIDs due to increased risk of bleeding on Xarelto  Advised to continue tramadol as needed  We will do bilateral knee corticosteroid injection  Start diclofenac gel as needed    Chondrocalcinosis-no flareups of pseudogout. Work-up negative for secondary causes      Positive TAINA 1: 160 speckled pattern. Other serologies negative. No other signs and symptoms concerning for lupus. Implications of low titer positive TAINA were discussed with patient.  About 15-20% of normal healthy individuals at her age may have low titer positive TAINA of unclear clinical significance. PROCEDURE NOTE    JOINT ASPIRATION/INJECTION  Bilateral knee corticosteroid injection:  The patient was informed about the risk and benefits of the procedure, including the risk of infection, hemorrhage and skin hypopigmentation from the corticosteroids. After informed consent was obtained, using sterile technique, the anteromedial area was prepped and chlorhexidine was used as local anesthetic. The joint was entered and Kenalog 80 mg and 2 ml plain Lidocaine was then injected and the needle withdrawn. The procedure was well tolerated. No immediate complication noticed. The patient is asked to continue to rest the joint for a few more days before resuming regular activities. Advised patient to watch for fever, increased swelling or persistent pain in the joint. Call or return to clinic prn if such symptoms occur or there is failure to improve as anticipated. The patient indicates understanding of these issues and agrees with the plan. Return in about 6 months (around 11/10/2021). The risks and benefits of my recommendations, as well as other treatment options, benefits and side effects werediscussed with the patient. All questions were answered. MEDICATIONS  Current Outpatient Medications   Medication Sig Dispense Refill    diclofenac sodium (VOLTAREN) 1 % GEL Apply 4 grams to lower extremity joints and 2 grams to upper extremity joints as needed 4 times/day 500 g 2    gentamicin (GARAMYCIN) 0.1 % cream Apply topically daily.  1 Tube 0    tiZANidine (ZANAFLEX) 4 MG tablet 1 tab nightly and 1/2 tab during day prn muscle spasm/ tightness 45 tablet 1    furosemide (LASIX) 40 MG tablet TAKE 1 TABLET BY MOUTH  DAILY AS NEEDED FOR  SWELLING/ WEIGHT GAIN 90 tablet 1    levothyroxine (SYNTHROID) 75 MCG tablet TAKE 1 TABLET BY MOUTH  DAILY 90 tablet 3    ipratropium (ATROVENT) 0.06 % nasal spray SPRAY ONE TO TWO SPRAYS IN EACH NOSTRIL THREE TIMES A DAY AS NEEDED FOR RHINITIS 1 Bottle 2    losartan (COZAAR) 100 MG tablet TAKE ONE TABLET BY MOUTH ONCE NIGHTLY (Patient taking differently: 50 mg ) 90 tablet 4    sotalol (BETAPACE) 80 MG tablet TAKE ONE TABLET BY MOUTH TWICE A  tablet 3    spironolactone (ALDACTONE) 25 MG tablet Take 1 tablet by mouth daily 90 tablet 3    rivaroxaban (XARELTO) 15 MG TABS tablet TAKE 1 TABLET BY MOUTH  DAILY WITH BREAKFAST 90 tablet 3    omeprazole 20 MG EC tablet Take 20 mg by mouth daily      Multiple Vitamins-Minerals (THERAPEUTIC MULTIVITAMIN-MINERALS) tablet Take 1 tablet by mouth daily.  diphenhydrAMINE (BENADRYL) 25 MG tablet Take 25 mg by mouth nightly as needed.  traMADol (ULTRAM) 50 MG tablet Take 1-2 tablets by mouth every 6 hours as needed for Pain for up to 30 days. 240 tablet 2     No current facility-administered medications for this visit. ALLERGIES  Allergies   Allergen Reactions    Demerol     Dilaudid [Hydromorphone Hcl]     Hydrochlorothiazide     Macrobid [Nitrofurantoin Monohydrate Macrocrystals]     Other      FRESH FROZEN PLASMA- CAUSED SWELLING OF EYES AND MOUTH, HIVES!  Sulfa Antibiotics     Tetanus Toxoids          Comments  No specialty comments available. Background history:  Nona Castillo is a 80 y.o. female with past medical history of atrial fibrillation, hypertension, hypothyroidism, DVT, pulmonary embolism, gallbladder cancer who is being seen for follow up evaluation of  left elbow pain and swelling. She presented with left elbow pain and swelling associated with erythema and warmth in November 2020. She denies any trauma or bug bite. She denies any preceding inciting event. She denies fever or swollen glands. Symptoms lasted for few weeks and then resolved on its own. She was given some prednisone which helped. She has not had any similar flareups in the past.  She has not had any new episodes since.   She denies dactylitis, enthesitis, tenosynovitis, inflammatory bowel disease or inflammatory back pain or psoriasis. She has on and off pain in her knees and hands. She denies any swelling. Morning stiffness lasting for few minutes. Mother had arthritis does not know which kind. She had blood work by PCP that showed RF 30, normal ESR and uric acid. She had left elbow x-ray that was unremarkable. Interim history: She presents for follow-up of osteoarthritis, positive TAINA and chondrocalcinosis. She has osteoarthritis in multiple joints. Hand and knee x-ray show changes consistent with severe degenerative arthritis and chondrocalcinosis. Work-up came back negative for secondary causes of chondrocalcinosis. She has not had any flareup of pseudogout. She takes tramadol as needed. She continues to complain of pain and stiffness in the knees. She has difficulty getting in and out of the car, climbing the steps. She uses wheelchair. She is unable to take oral NSAIDs due to increased risk of bleeding on Xarelto. She has positive any 1: 160 speckled pattern. Other serologies came back negative. No other signs and symptoms concerning for lupus or systemic rheumatic disease. Low titer positive RF. Hepatitis panel, anti-CCP antibody negative. HPI  Review of Systems    REVIEW OF SYSTEMS:   Constitutional: No unanticipated weight loss or fevers. Integumentary: No rash, photosensitivity, malar rash, livedo reticularis, alopecia and Raynaud's symptoms, sclerodactyly, skin tightening  Eyes: negative for visual disturbance and persistent redness, discharge from eyes   ENT: - No tinnitus, loss of hearing, vertigo, or recurrent ear infections.  - No history of nasal/oral ulcers. - No history of dry eyes/dry mouth  Cardiovascular: No history of pericarditis, chest pain or murmur or palpitations  Respiratory: No shortness of breath, cough or history of interstitial lung disease. No history of pleurisy. No history of tuberculosis or atypical infections. Gastrointestinal: No history of heart burn, dysphagia or esophageal dysmotility. No change in bowel habits or any inflammatory bowel disease. Genitourinary: No history renal disease, miscarriages. Hematologic/Lymphatic: No abnormal bruising or bleeding, blood clots or swollen lymph nodes. Neurological: No history of headaches, seizure or focal weakness. No history of neuropathies, paresthesias or hyperesthesias, facial droop, diplopia  Psychiatric: No history of bipolar disease, anxiety, depression  Endocrine: Denies any polyuria, polydipsia and osteoporosis  Allergic/Immunologic: No nasal congestion or hives. I have reviewed patients Past medical History, Social History and Family History as mentioned in her chart and this remains unchanged fromprevious. Past Medical History:   Diagnosis Date    Atrial fibrillation ([address]     Cancer (HCC)     gallbladder    Chronic ulcer of right great toe with fat layer exposed ([address] 2/18/2021    Hypertension     Hypothyroidism     Leg DVT (deep venous thromboembolism), acute ([address]     Osteoarthritis     PE (pulmonary embolism)     Scoliosis      Past Surgical History:   Procedure Laterality Date   [place]  7,81,7021    with cholangiogram    ERCP  6/17/2013    with Sphinchterotomy/Stone Removal    HYSTERECTOMY      PACEMAKER INSERTION      UPPER GASTROINTESTINAL ENDOSCOPY  10/21/2016    esophageal tear     Social History     Socioeconomic History    Marital status:       Spouse name: Not on file    Number of children: Not on file    Years of education: Not on file    Highest education level: Not on file   Occupational History    Not on file   Social Needs    Financial resource strain: Not on file    Food insecurity     Worry: Not on file     Inability: Not on file    Transportation needs     Medical: Not on file     Non-medical: Not on file   Tobacco Use    Smoking status: Former Smoker     Quit date: 6/15/1965 Years since quittin.9    Smokeless tobacco: Never Used   Substance and Sexual Activity    Alcohol use: No    Drug use: No    Sexual activity: Not on file   Lifestyle    Physical activity     Days per week: Not on file     Minutes per session: Not on file    Stress: Not on file   Relationships    Social connections     Talks on phone: Not on file     Gets together: Not on file     Attends Anabaptist service: Not on file     Active member of club or organization: Not on file     Attends meetings of clubs or organizations: Not on file     Relationship status: Not on file    Intimate partner violence     Fear of current or ex partner: Not on file     Emotionally abused: Not on file     Physically abused: Not on file     Forced sexual activity: Not on file   Other Topics Concern    Not on file   Social History Narrative    Not on file     No family history on file.       PHYSICAL EXAM   Vitals:    05/10/21 1435   BP: 130/82   Pulse: 76   Resp: 16   SpO2: 99%   Weight: 150 lb (68 kg)   Height: 5' (1.524 m)     Physical Exam  Constitutional:  Well developed, well nourished, no acute distress, non-toxic appearance   Musculoskeletal:   RIGHT  Swell  Tender  ROM  LEFT  Swell  Tender  ROM    DIP2  0  0  FULL   0  0  FULL    DIP3  0  0  FULL   0  0  FULL    DIP4  0  0  FULL   0  0  FULL    DIP5  0  0  FULL   0  0  FULL    PIP1  0  0  FULL   0  0  FULL    PIP2  0  0  FULL   0  0  FULL    PIP3  0  0  FULL   0  0  FULL    PIP4  0  0  FULL   0  0  FULL    PIP5  0  0  FULL   0  0  FULL    MCP1  0  0  FULL   0  0  FULL    MCP2  0  0  ud/bony changes  0  0  UD/bony change   MCP3  0  0  UD/bony changes  0  0  UD/bony change   MCP4  0  0  UD/bony change  0  0  FULL    MCP5  0  0  UD/bony changes  0  0  FULL    Wrist  0  0  FULL   0  0  FULL    Elbow  0  0  FULL   0  0  FULL    Shouldr  0  0  FULL   0  0  FULL    Hip  0  0  FULL   0  0  FULL    Knee  0  0   bony changes/valgus/crepitus  0  0   bony changes/valgus/crepitus Ankle  0  0  FULL   0  0  FULL    MTP1  0  0  FULL   0  0  FULL    MTP2  0  0  FULL   0  0  FULL    MTP3  0  0  FULL   0  0  FULL    MTP4  0  0  FULL   0  0  FULL    MTP5  0  0  FULL   0  0  FULL    IP1  0  0  FULL   0  0  FULL    IP2  0  0  FULL   0  0  FULL    IP3  0  0  FULL   0  0  FULL    IP4  0  0  FULL   0  0  FULL    IP5  0  0  FULL   0 0 FULL     Squaring, bony enlargement of bilateral CMC joints  Ambulates without assistance, normal gait  Neck: Full ROM, no tenderness,supple   Back- no tenderness. Eyes:  PERRL, extra ocular movements intact, conjunctiva normal   HEENT:  Atraumatic, normocephalic, external ears normal, oropharynx moist, no pharyngeal exudates. Respiratory:  No respiratory distress  GI:  Soft, nondistended, normal bowel sounds, nontender, noorganomegaly, no mass, no rebound, no guarding   :  No costovertebral angle tenderness   Integument:  Well hydrated, no rash or telangiectasias  Lymphatic:  No lymphadenopathy noted   Neurologic:   Alert & oriented x 3, CN 2-12 normal, no focal deficits noted. Sensations Intact. Muscle strength 5/5 proximallyand distally in upper and lower extremities.    Psychiatric:  Speech and behavior appropriate           LABS AND IMAGING  Outside data reviewed and in HPI    Lab Results   Component Value Date    WBC 9.5 11/12/2020    RBC 4.92 11/12/2020    RBC 4.92 04/21/2016    HGB 14.8 11/12/2020    HCT 44.0 11/12/2020     11/12/2020    MCV 89.5 11/12/2020    MCH 30.0 11/12/2020    MCHC 33.5 11/12/2020    RDW 14.1 11/12/2020    SEGSPCT 75.6 06/15/2013    BANDSPCT 0 04/20/2017    LYMPHOPCT 14.1 11/12/2020    LYMPHOPCT 22.9 04/21/2016    MONOPCT 8.1 11/12/2020    EOSPCT 1 04/20/2017    BASOPCT 0.3 11/12/2020    MONOSABS 0.8 11/12/2020    LYMPHSABS 1.3 11/12/2020    EOSABS 0.0 11/12/2020    BASOSABS 0.0 11/12/2020    DIFFTYPE Auto 06/15/2013       Chemistry        Component Value Date/Time     11/12/2020 1325    K 3.9 11/12/2020 1325    K 3.4 (L) 01/13/2020 0602    CL 99 11/12/2020 1325    CO2 29 11/12/2020 1325    BUN 22 (H) 11/12/2020 1325    CREATININE 1.1 11/12/2020 1325        Component Value Date/Time    CALCIUM 10.1 11/12/2020 1325    ALKPHOS 118 11/12/2020 1325    AST 24 11/12/2020 1325    ALT 12 11/12/2020 1325    BILITOT 0.7 11/12/2020 1325          Lab Results   Component Value Date    SEDRATE 8 04/05/2021     Lab Results   Component Value Date    CRP <3.0 04/05/2021     No results found for: TAINA, SHOLA, SSA, SSB, C3, C4  Lab Results   Component Value Date    RF 30.0 11/12/2020     Lab Results   Component Value Date    ANATITER 1:160 04/05/2021     No results found for: DSDNAG, DSDNAIGGIFA  No results found for: SSAROAB, SSALAAB  No results found for: SMAB, RNPAB  No results found for: CENTABIGG  No results found for: C3, C4, ACE  Lab Results   Component Value Date    VITD25 35.9 09/12/2017     Lab Results   Component Value Date    LABURIC 5.5 04/05/2021     Lab Results   Component Value Date    QMHEHLIR42 797 11/07/2019     Lab Results   Component Value Date    TSHFT4 0.83 04/14/2021    TSH 0.74 01/12/2020     Lab Results   Component Value Date    VITD25 35.9 09/12/2017       Radiology:    Left elbow x-rays 11/12/2020     FINDINGS:   There is no evidence of fracture, malalignment, or other acute osseous   abnormality. No suspicious soft tissue abnormalities are identified.           Impression   No acute osseous abnormality. Bilateral hand and knee x-rays 04/14/2021  1. No acute osseous abnormality of the right or left hand identified. 2. Mild-to-moderate degenerative changes of the bilateral hands and moderate   to severe osteoarthritis of the bilateral 1st CMC joint and triscaphe joints. 3. Widening of the bilateral scapholunate intervals compatible with   underlying ligamentous injury, likely chronic. 4. Chondrocalcinosis bilaterally. 5. Osteopenia. . No acute osseous abnormality of the right or left knee identified.    2. Severe osteoarthritis of the lateral compartments of both the right and   left knee. 3. Chondrocalcinosis of the bilateral knees. 4. Osteopenia. 5. Severe atherosclerotic disease. ######################################################################    I thank you for giving me theopportunity to participate in [facility]. If you have any questions or concerns please feel free to contact me. I look forward to following  Mildred Pruett along with you. Electronically signed by: Mary Wolff MD, 5/10/2021 3:39 PM    Documentation was done using voice recognition dragon software. Every effort was made to ensure accuracy;however, inadvertent unintentional computerized transcription errors may be present.

## 2021-06-23 ENCOUNTER — OFFICE VISIT (OUTPATIENT)
Dept: FAMILY MEDICINE CLINIC | Age: 86
End: 2021-06-23
Payer: MEDICARE

## 2021-06-23 VITALS — HEIGHT: 60 IN | TEMPERATURE: 97.2 F | WEIGHT: 157.6 LBS | BODY MASS INDEX: 30.94 KG/M2

## 2021-06-23 DIAGNOSIS — I50.42 CHRONIC COMBINED SYSTOLIC AND DIASTOLIC CONGESTIVE HEART FAILURE (HCC): ICD-10-CM

## 2021-06-23 DIAGNOSIS — L30.8 OTHER ECZEMA: Primary | ICD-10-CM

## 2021-06-23 DIAGNOSIS — I48.91 ATRIAL FIBRILLATION, UNSPECIFIED TYPE (HCC): ICD-10-CM

## 2021-06-23 DIAGNOSIS — I26.99 ACUTE PULMONARY EMBOLISM, UNSPECIFIED PULMONARY EMBOLISM TYPE, UNSPECIFIED WHETHER ACUTE COR PULMONALE PRESENT (HCC): ICD-10-CM

## 2021-06-23 PROCEDURE — G8427 DOCREV CUR MEDS BY ELIG CLIN: HCPCS | Performed by: NURSE PRACTITIONER

## 2021-06-23 PROCEDURE — 1036F TOBACCO NON-USER: CPT | Performed by: NURSE PRACTITIONER

## 2021-06-23 PROCEDURE — 99213 OFFICE O/P EST LOW 20 MIN: CPT | Performed by: NURSE PRACTITIONER

## 2021-06-23 PROCEDURE — 1123F ACP DISCUSS/DSCN MKR DOCD: CPT | Performed by: NURSE PRACTITIONER

## 2021-06-23 PROCEDURE — 4040F PNEUMOC VAC/ADMIN/RCVD: CPT | Performed by: NURSE PRACTITIONER

## 2021-06-23 PROCEDURE — 1090F PRES/ABSN URINE INCON ASSESS: CPT | Performed by: NURSE PRACTITIONER

## 2021-06-23 PROCEDURE — G8417 CALC BMI ABV UP PARAM F/U: HCPCS | Performed by: NURSE PRACTITIONER

## 2021-06-23 SDOH — ECONOMIC STABILITY: FOOD INSECURITY: WITHIN THE PAST 12 MONTHS, THE FOOD YOU BOUGHT JUST DIDN'T LAST AND YOU DIDN'T HAVE MONEY TO GET MORE.: NEVER TRUE

## 2021-06-23 SDOH — ECONOMIC STABILITY: FOOD INSECURITY: WITHIN THE PAST 12 MONTHS, YOU WORRIED THAT YOUR FOOD WOULD RUN OUT BEFORE YOU GOT MONEY TO BUY MORE.: NEVER TRUE

## 2021-06-23 ASSESSMENT — ENCOUNTER SYMPTOMS
WHEEZING: 0
SHORTNESS OF BREATH: 0

## 2021-06-23 ASSESSMENT — SOCIAL DETERMINANTS OF HEALTH (SDOH): HOW HARD IS IT FOR YOU TO PAY FOR THE VERY BASICS LIKE FOOD, HOUSING, MEDICAL CARE, AND HEATING?: NOT HARD AT ALL

## 2021-06-23 NOTE — PROGRESS NOTES
Mane Taylor (:  3/31/1927) is a 80 y.o. female,Established patient, here for evaluation of the following chief complaint(s):  Rash (PT CO RASH ON BILAT ARMS AND LEGS FOR A FEW WEEKS- VERY VERY ITCHY BUT NO PAIN OR BURNING- NO CHANGES IN SOAPS, ETC. )      ASSESSMENT/PLAN:  1. Other eczema  -Presentation is consistent with eczema. Triamcinolone cream is being sent to the pharmacy. The patient was educated on medication use as well as side effects. Apply twice daily for 2 weeks follow-up if no improvement once the 2 weeks are complete. -     triamcinolone (KENALOG) 0.1 % ointment; Apply topically 2 times daily for 14 days, Topical, 2 TIMES DAILY Starting 2021, Until 2021, For 14 days, Disp-80 g, R-1, Normal  2. Acute pulmonary embolism, unspecified pulmonary embolism type, unspecified whether acute cor pulmonale present (HCC)  -Continue Xarelto. No acute issues at this time. 3. Chronic combined systolic and diastolic congestive heart failure ([address]  -Followed by cardiology. Continue furosemide and spironolactone. 4. Atrial fibrillation, unspecified type ([address]  -Controlled with sotalol. No changes today. [facility] cardiology. Return in about 3 months (around 2021) for Annual Wellness/Pain Follow-up. SUBJECTIVE/OBJECTIVE:  MIKAL Mancuso presents today for evaluation of a rash to her arms and legs. She states that the rash has been present for a few weeks. Denies any changes in soaps. Also denies any changes in medication. Cannot think of any new allergens that may have caused the rash. Has not been outside in any foliage. She denies any fevers or chills. Denies any drainage from the rash. States it is very itchy. She has not tried any interventions at this time. Review of Systems   Constitutional: Negative for chills and fever. Respiratory: Negative for shortness of breath and wheezing. Cardiovascular: Negative for chest pain, palpitations and leg swelling.

## 2021-06-23 NOTE — PATIENT INSTRUCTIONS
using this medicine to treat the skin on your hands. Apply a thin layer of medicine to the affected skin. Do not apply this medicine over a large area of skin unless your doctor has told you to. Do not cover the treated skin area with a bandage or other covering unless your doctor tells you to. Covering treated areas can increase the amount of medicine absorbed through your skin and may cause harmful effects. If you are treating the diaper area, do not use plastic pants or tight-fitting diapers. To use the dental paste, press a small dab onto the mouth ulcer but do not rub in the medicine. The dab will form a thin film that should be left in place for several hours. Triamcinolone dental paste is usually applied at bedtime and/or after meals. Follow your doctor's instructions. Call your doctor if your symptoms do not improve, or if they get worse. A mouth ulcer should improve within 1 week of using triamcinolone dental paste. You should stop using this medicine once your symptoms are controlled. Store at room temperature away from moisture and heat. What happens if I miss a dose? Apply the medicine as soon as you can, but skip the missed dose if it is almost time for your next dose. Do not apply two doses at one time. What happens if I overdose? Seek emergency medical attention or call the Poison Help line at 1-789.138.5222 if anyone has accidentally swallowed the medication. High doses or long-term use of steroid medicine can lead to thinning skin, easy bruising, changes in body fat (especially in your face, neck, back, and waist), increased acne or facial hair, menstrual problems, impotence, or loss of interest in sex. What should I avoid while using triamcinolone topical?  Avoid getting this medicine in your eyes. Avoid using other topical steroid medications on the areas you treat with triamcinolone unless your doctor tells you to.   Do not use triamcinolone topical to treat any condition that has not been checked by your doctor. Do not share this medicine with another person, even if they have the same symptoms you have. What are the possible side effects of triamcinolone topical?  Get emergency medical help if you have signs of an allergic reaction: hives; difficult breathing; swelling of your face, lips, tongue, or throat. Call your doctor at once if you have:  · worsening of your skin condition;  · redness, warmth, swelling, oozing, or severe irritation of any treated skin;  · blurred vision, tunnel vision, eye pain, or seeing halos around lights;  · high blood sugar --increased thirst, increased urination, dry mouth, fruity breath odor; or  · possible signs of absorbing this medicine through your skin or gums --weight gain (especially in your face or your upper back and torso), slow wound healing, thinning or discolored skin, increased body hair, muscle weakness, nausea, diarrhea, tiredness, mood changes, menstrual changes, sexual changes. Children can absorb larger amounts of this medicine through the skin and may be more likely to have side effects such as growth delay, headaches, or pain behind the eyes. A baby using this medicine may have a bulging soft spot (the top of the head where the skull hasn't yet grown together). Common side effects may include:  · burning, itching, dryness, or other irritation of treated skin;  · redness or crusting around your hair follicles;  · redness or itching around your mouth;  · allergic skin reaction;  · stretch marks;  · acne, increased body hair growth;  · thinning skin or discoloration; or  · white or \"pruned\" appearance of the skin (caused by covering treated skin with a tight bandage or other covering). This is not a complete list of side effects and others may occur. Call your doctor for medical advice about side effects. You may report side effects to FDA at 2-613-FDA-0015.   What other drugs will affect triamcinolone topical?  Medicine used on the skin is not likely to be affected by other drugs you use. But many drugs can interact with each other. Tell each of your healthcare providers about all medicines you use, including prescription and over-the-counter medicines, vitamins, and herbal products. Where can I get more information? Your pharmacist can provide more information about triamcinolone topical.  Remember, keep this and all other medicines out of the reach of children, never share your medicines with others, and use this medication only for the indication prescribed. Every effort has been made to ensure that the information provided by [address] is accurate, up-to-date, and complete, but no guarantee is made to that effect. Drug information contained herein may be time sensitive. [facility] information has been compiled for use by healthcare practitioners and consumers in the [place] and therefore [facility] does not warrant that uses outside of the [place] are appropriate, unless specifically indicated otherwise. [facility]'s drug information does not endorse drugs, diagnose patients or recommend therapy. [facility] drug information is an informational resource designed to assist licensed healthcare practitioners in caring for their patients and/or to serve consumers viewing this service as a supplement to, and not a substitute for, the expertise, skill, knowledge and judgment of healthcare practitioners. The absence of a warning for a given drug or drug combination in no way should be construed to indicate that the drug or drug combination is safe, effective or appropriate for any given patient. [facility] does not assume any responsibility for any aspect of healthcare administered with the aid of information [facility] provides. The information contained herein is not intended to cover all possible uses, directions, precautions, warnings, drug interactions, allergic reactions, or adverse effects.  If you have questions about the drugs you are taking, check with your doctor, nurse or pharmacist.  Copyright 3437-0148 [address] [place] [address] Dr LUEVANO. Version: 8.02. Revision date: 12/27/2019. Care instructions adapted under license by [facility] ([facility]). If you have questions about a medical condition or this instruction, always ask your healthcare professional. [address] any warranty or liability for your use of this information.

## 2021-07-13 ENCOUNTER — TELEPHONE (OUTPATIENT)
Dept: FAMILY MEDICINE CLINIC | Age: 86
End: 2021-07-13

## 2021-07-13 NOTE — TELEPHONE ENCOUNTER
If this is excema, sun can help to clear it up BUT pt is on spirinolactone which can make her more sensitive to sun exposure.      Excema treatment- emollient cream like Eucerin 1-2 times daily, proper oral hydration, managing allergies with over the counter allergy medication as needed helps with itching and helps manage excema flare ups as well, use mild soap

## 2021-07-13 NOTE — TELEPHONE ENCOUNTER
Patient son is calling because the pharmacy told him it is to early to refill the medication - triamcinolone 0.1 % ointment. 80 gram tube. Please give him a call back. She last had fill on 6/24/21. Renae Salazar [address] - Phone no. [phone]    Is there anyway to get refill sooner?

## 2021-07-13 NOTE — TELEPHONE ENCOUNTER
Is it improving? [facility]un[facility] not says to use for 2 weeks and follow up if not improved. It is not a medication that should be used twice daily indefinitely.  Whether or not to refill is based on insurance, he did put a refill on there but it likely is too soon

## 2021-07-13 NOTE — TELEPHONE ENCOUNTER
CALLED AND SPOKE TO VIRGIE AND ADVISED HIM ON LUCIE NOTE. THEY MADE A FOLLOW UP APPT ON 7/20/21 WITH LUCIE TO RECHECK THE RASH. SON WANTS TO KNOW IF SITTING OUT IN THE SUN COULD HELP AT ALL.  SC

## 2021-07-20 ENCOUNTER — OFFICE VISIT (OUTPATIENT)
Dept: FAMILY MEDICINE CLINIC | Age: 86
End: 2021-07-20
Payer: MEDICARE

## 2021-07-20 VITALS
SYSTOLIC BLOOD PRESSURE: 130 MMHG | HEART RATE: 96 BPM | HEIGHT: 60 IN | DIASTOLIC BLOOD PRESSURE: 80 MMHG | WEIGHT: 161 LBS | BODY MASS INDEX: 31.61 KG/M2 | OXYGEN SATURATION: 98 %

## 2021-07-20 DIAGNOSIS — I87.2 VENOUS INSUFFICIENCY OF LEFT LEG: ICD-10-CM

## 2021-07-20 DIAGNOSIS — L30.9 ACUTE ECZEMA: Primary | ICD-10-CM

## 2021-07-20 PROCEDURE — 4040F PNEUMOC VAC/ADMIN/RCVD: CPT | Performed by: NURSE PRACTITIONER

## 2021-07-20 PROCEDURE — 99213 OFFICE O/P EST LOW 20 MIN: CPT | Performed by: NURSE PRACTITIONER

## 2021-07-20 PROCEDURE — 1036F TOBACCO NON-USER: CPT | Performed by: NURSE PRACTITIONER

## 2021-07-20 PROCEDURE — 1090F PRES/ABSN URINE INCON ASSESS: CPT | Performed by: NURSE PRACTITIONER

## 2021-07-20 PROCEDURE — 1123F ACP DISCUSS/DSCN MKR DOCD: CPT | Performed by: NURSE PRACTITIONER

## 2021-07-20 PROCEDURE — G8427 DOCREV CUR MEDS BY ELIG CLIN: HCPCS | Performed by: NURSE PRACTITIONER

## 2021-07-20 PROCEDURE — G8417 CALC BMI ABV UP PARAM F/U: HCPCS | Performed by: NURSE PRACTITIONER

## 2021-07-20 ASSESSMENT — ENCOUNTER SYMPTOMS
ABDOMINAL DISTENTION: 0
EYE DISCHARGE: 0
COLOR CHANGE: 0
DIARRHEA: 0
SHORTNESS OF BREATH: 0
ABDOMINAL PAIN: 0
NAUSEA: 0
SINUS PAIN: 0
BACK PAIN: 0
CONSTIPATION: 0
CHEST TIGHTNESS: 0
COUGH: 0
SINUS PRESSURE: 0

## 2021-07-20 NOTE — PATIENT INSTRUCTIONS
Use claritin or allegra daily as well    Drink plenty of fluids    Minimize itching    Take cool showers or rinse with cool water at the end of shower or bath    Consider changing lotions/soaps/detergents/sunscreens to mild hypoallergenic      Patient Education        Atopic Dermatitis: Care Instructions  Your Care Instructions  Atopic dermatitis (also called eczema) is a skin problem that causes intense itching and a red, raised rash. In severe cases, the rash develops clear fluidfilled blisters. The rash is not contagious. People with this condition seem to have very sensitive immune systems that are likely to react to things that cause allergies. The immune system is the body's way of fighting infection. There is no cure for atopic dermatitis, but you may be able to control it with care at home. Follow-up care is a key part of your treatment and safety. Be sure to make and go to all appointments, and call your doctor if you are having problems. It's also a good idea to know your test results and keep a list of the medicines you take. How can you care for yourself at home? · Use moisturizer at least twice a day. · If your doctor prescribes a cream, use it as directed. If your doctor prescribes other medicine, take it exactly as directed. · Wash the affected area with water only. Soap can make dryness and itching worse. Pat dry. · Apply a moisturizer after bathing. Use a cream such as Lubriderm, Moisturel, or Cetaphil that does not irritate the skin or cause a rash. Apply the cream while your skin is still damp after lightly drying with a towel. · Use cold, wet cloths to reduce itching. · Keep cool, and stay out of the sun. · If itching affects your normal activities, an over-the-counter antihistamine, such as diphenhydramine (Benadryl) or loratadine (Claritin) may help. Read and follow all instructions on the label. When should you call for help?    Call your doctor now or seek immediate medical care if:    · Your rash gets worse and you have a fever.     · You have new blisters or bruises, or the rash spreads and looks like a sunburn.     · You have signs of infection, such as:  ? Increased pain, swelling, warmth, or redness. ? Red streaks leading from the rash. ? Pus draining from the rash. ? A fever.     · You have crusting or oozing sores.     · You have joint aches or body aches along with your rash. Watch closely for changes in your health, and be sure to contact your doctor if:    · Your rash does not clear up after 2 to 3 weeks of home treatment.     · Itching interferes with your sleep or daily activities. Where can you learn more? Go to https://[employer]. org and sign in to your [employer] account. Enter C829 in the [employer] box to learn more about \"Atopic Dermatitis: Care Instructions. \"     If you do not have an account, please click on the \"Sign Up Now\" link. Current as of: March 3, 2021               Content Version: 12.9  © 4452-4202 [employer]. Care instructions adapted under license by [facility] ([facility]). If you have questions about a medical condition or this instruction, always ask your healthcare professional. [place] [address] any warranty or liability for your use of this information. Patient Education        Dermatitis: Care Instructions  Your Care Instructions  Dermatitis is the general name used for any rash or inflammation of the skin. Different kinds of dermatitis cause different kinds of rashes. Common causes of a rash include new medicines, plants (such as poison oak or poison ivy), heat, and stress. Certain illnesses can also cause a rash. An allergic reaction to something that touches your skin, such as latex, nickel, or poison ivy, is called contact dermatitis. Contact dermatitis may also be caused by something that irritates the skin, such as bleach, a chemical, or soap.  These types of rashes cannot be spread from person to person. How long your rash will last depends on what caused it. Rashes may last a few days or months. Follow-up care is a key part of your treatment and safety. Be sure to make and go to all appointments, and call your doctor if you are having problems. It's also a good idea to know your test results and keep a list of the medicines you take. How can you care for yourself at home? · Do not scratch the rash. Cut your nails short, and file them smooth. Or wear gloves if this helps keep you from scratching. · Wash the area with water only. Pat dry. · Put cold, wet cloths on the rash to reduce itching. · Keep cool, and stay out of the sun. · Leave the rash open to the air as much as possible. · If the rash itches, use hydrocortisone cream. Follow the directions on the label. Calamine lotion may help for plant rashes. · Take an over-the-counter antihistamine, such as diphenhydramine (Benadryl) or loratadine (Claritin), to help calm the itching. Read and follow all instructions on the label. · If your doctor prescribed a cream, use it as directed. If your doctor prescribed medicine, take it exactly as directed. When should you call for help? Call your doctor now or seek immediate medical care if:    · You have symptoms of infection, such as:  ? Increased pain, swelling, warmth, or redness. ? Red streaks leading from the area. ? Pus draining from the area. ? A fever.     · You have joint pain along with the rash. Watch closely for changes in your health, and be sure to contact your doctor if:    · Your rash is changing or getting worse.     · You are not getting better as expected. Where can you learn more? Go to https://[employer]iest[employer] Materials.[employer]. org and sign in to your [employer] account. Enter [phone] in the [employer] box to learn more about \"Dermatitis: Care Instructions. \"     If you do not have an account, please click on the \"Sign Up Now\" link.   Current as of: March 3, 2021               Content Version: 12.9  © 2006-2021 [email or web address]. Care instructions adapted under license by [facility] ([facility]). If you have questions about a medical condition or this instruction, always ask your healthcare professional. [address] any warranty or liability for your use of this information. Patient Education        Learning About Venous Insufficiency  What is it? Venous insufficiency is a problem with the flow of blood from the veins of the legs back to the heart. It's also called chronic venous insufficiency or chronic venous stasis. Your veins bring blood back to the heart after it flows through your body. Veins have valves that keep the blood moving in one directiontoward the heart. But with venous insufficiency, the veins of the legs might not work as they should. This can allow blood to leak backward. Fluid can pool in the legs. This can lead to problems that include varicose veins. What causes it? Venous insufficiency is sometimes caused by deep vein thrombosis and high blood pressure inside leg veins. You are more likely to have venous insufficiency if you:  · Are older. · Are female. · Are overweight. · Don't get enough physical activity. · Smoke. · Have a family history of varicose veins. What are the symptoms? Symptoms of venous insufficiency affect the legs. They may include:  · Swelling, often in the ankles. · A rash. · Varicose veins. · Itching. · Cramping. · Skin sores (ulcers). · Aching or a feeling of heaviness. · Changes in skin color. How is it diagnosed? Your doctor can diagnose venous insufficiency by examining your legs and by using a type of ultrasound test (duplex Doppler) to find out how well blood is flowing in your legs. How is it treated? To reduce swelling and relieve pain caused by venous insufficiency, you can wear compression stockings.  They are tighter at the ankles than at the top of the

## 2021-07-20 NOTE — PROGRESS NOTES
Date of Service:  2021    Benito Weston (:  3/31/1927) is a 80 y.o. female, here for evaluation of the following medical concerns:    Chief Complaint   Patient presents with    Rash     pt is here todya to recheck rash- not getting better         HPI     Rash  Patient complains of rash involving the arms, legs, and now right side of face. Rash started 6 weeks ago. Appearance of rash at onset: Color of lesion(s): starts flesh colored ans raised and itchy and then opens to red sores, Texture of lesion(s): raised, Size of lesion(s): small size of pencil tip up to half dollar size. Rash has changed over time Initial distribution: initially on wrists and now up arms and on legs and just started on right side of face today. Discomfort associated with rash: is pruritic. Associated symptoms: none. Denies: fever, cough, congestion, sore throat, headache, arthralgia, abdominal pain, nausea, vomiting, dark urine, diarrhea, myalgia, crankiness, irritability, decrease in appetite, decrease in energy level. Patient has had previous evaluation of rash. Patient has had previous treatment- triamcinolone. Response to treatment: did not make much difference per pt. Patient has not had contacts with similar rash. Patient has not identified precipitant. Patient has not had new exposures (soaps, lotions, laundry detergents, foods, medications, plants, insects or animals.)    Review of Systems   Constitutional: Negative for activity change, appetite change, fatigue, fever and unexpected weight change. HENT: Negative for congestion, ear pain, sinus pressure and sinus pain. Eyes: Negative for discharge and visual disturbance. Respiratory: Negative for cough, chest tightness and shortness of breath. Cardiovascular: Negative for chest pain, palpitations and leg swelling. Gastrointestinal: Negative for abdominal distention, abdominal pain, constipation, diarrhea and nausea.    Endocrine: Negative for cold intolerance, heat intolerance, polydipsia, polyphagia and polyuria. Genitourinary: Negative for decreased urine volume, difficulty urinating, dysuria, flank pain, frequency and urgency. Musculoskeletal: Negative for arthralgias, back pain, gait problem, joint swelling, myalgias and neck pain. Skin: Positive for rash. Negative for color change and wound. Allergic/Immunologic: Negative for food allergies and immunocompromised state. Neurological: Negative for dizziness, tremors, speech difficulty, weakness, light-headedness, numbness and headaches. Hematological: Negative for adenopathy. Does not bruise/bleed easily. Psychiatric/Behavioral: Negative for confusion, decreased concentration, self-injury, sleep disturbance and suicidal ideas. The patient is not nervous/anxious. Prior to Visit Medications    Medication Sig Taking? Authorizing Provider   halobetasol (ULTRAVATE) 0.05 % cream Apply topically 2 times daily for up to 2 weeks. After this, would need to use lower potency steroid cream Yes SABA Joe CNP   diclofenac sodium (VOLTAREN) 1 % GEL Apply 4 grams to lower extremity joints and 2 grams to upper extremity joints as needed 4 times/day Yes Sarwat Gardiner MD   tiZANidine (ZANAFLEX) 4 MG tablet 1 tab nightly and 1/2 tab during day prn muscle spasm/ tightness Yes Kj Whitaker MD   traMADol (ULTRAM) 50 MG tablet Take 1-2 tablets by mouth every 6 hours as needed for Pain for up to 30 days.  Yes Kj Whitaker MD   furosemide (LASIX) 40 MG tablet TAKE 1 TABLET BY MOUTH  DAILY AS NEEDED FOR  SWELLING/ WEIGHT GAIN Yes Kj Whitaker MD   levothyroxine (SYNTHROID) 75 MCG tablet TAKE 1 TABLET BY MOUTH  DAILY Yes SABA Klein CNP   ipratropium (ATROVENT) 0.06 % nasal spray SPRAY ONE TO TWO SPRAYS IN EACH NOSTRIL THREE TIMES A DAY AS NEEDED FOR RHINITIS Yes SABA Klein CNP   sotalol (BETAPACE) 80 MG tablet TAKE ONE TABLET BY MOUTH TWICE A DAY Yes Kj Whitaker MD   spironolactone (ALDACTONE) 25 MG tablet Take 1 tablet by mouth daily Yes Grace Bates MD   rivaroxaban (XARELTO) 15 MG TABS tablet TAKE 1 TABLET BY MOUTH  DAILY WITH BREAKFAST Yes Grace Bates MD   omeprazole 20 MG EC tablet Take 20 mg by mouth daily Yes Historical Provider, MD   Multiple Vitamins-Minerals (THERAPEUTIC MULTIVITAMIN-MINERALS) tablet Take 1 tablet by mouth daily. Yes Historical Provider, MD   diphenhydrAMINE (BENADRYL) 25 MG tablet Take 25 mg by mouth nightly as needed. Yes Historical Provider, MD   gentamicin (GARAMYCIN) 0.1 % cream Apply topically daily. Patient not taking: Reported on 2021  SABA Brooks CNP   losartan (COZAAR) 100 MG tablet TAKE ONE TABLET BY MOUTH ONCE NIGHTLY  Patient not taking: Reported on 2021  SABA Castro CNP        Social History     Tobacco Use    Smoking status: Former Smoker     Quit date: 6/15/1965     Years since quittin.2    Smokeless tobacco: Never Used   Substance Use Topics    Alcohol use: No        Vitals:    21 1415   BP: 130/80   Site: Right Upper Arm   Position: Sitting   Cuff Size: Medium Adult   Pulse: 96   SpO2: 98%   Weight: 161 lb (73 kg)   Height: 5' (1.524 m)     Estimated body mass index is 31.44 kg/m² as calculated from the following:    Height as of this encounter: 5' (1.524 m). Weight as of this encounter: 161 lb (73 kg). Physical Exam  Vitals reviewed. Constitutional:       General: She is awake. Appearance: Normal appearance. She is well-developed and well-groomed. She is obese. She is not ill-appearing. HENT:      Head: Normocephalic and atraumatic. Right Ear: External ear normal. Decreased hearing noted. Left Ear: External ear normal. Decreased hearing noted. Ears:      Comments: fatoumata hearing aids     Nose: Nose normal.      Mouth/Throat:      Lips: Pink. Mouth: Mucous membranes are moist.      Pharynx: Oropharynx is clear.    Eyes:      General: Lids are normal.      Extraocular Movements: Extraocular movements intact. Conjunctiva/sclera: Conjunctivae normal.      Pupils: Pupils are equal, round, and reactive to light. Neck:      Thyroid: No thyromegaly. Vascular: No carotid bruit. Cardiovascular:      Rate and Rhythm: Normal rate. Pulses:           Carotid pulses are 2+ on the right side and 2+ on the left side. Radial pulses are 2+ on the right side and 2+ on the left side. Posterior tibial pulses are 2+ on the right side and 2+ on the left side. Heart sounds: Normal heart sounds, S1 normal and S2 normal. No murmur heard. Pulmonary:      Effort: Pulmonary effort is normal.      Breath sounds: Normal breath sounds. Abdominal:      General: Bowel sounds are normal. There is no abdominal bruit. Palpations: Abdomen is soft. Tenderness: There is no abdominal tenderness. Genitourinary:     Comments: Deferred  Musculoskeletal:         General: Normal range of motion. Cervical back: Full passive range of motion without pain, normal range of motion and neck supple. Right lower leg: No edema. Left lower leg: No edema. Lymphadenopathy:      Head:      Right side of head: No submental, submandibular, tonsillar, preauricular, posterior auricular or occipital adenopathy. Left side of head: No submental, submandibular, tonsillar, preauricular, posterior auricular or occipital adenopathy. Cervical: No cervical adenopathy. Right cervical: No superficial, deep or posterior cervical adenopathy. Left cervical: No superficial, deep or posterior cervical adenopathy. Upper Body:      Right upper body: No supraclavicular adenopathy. Left upper body: No supraclavicular adenopathy. Skin:     General: Skin is warm and dry. Capillary Refill: Capillary refill takes less than 2 seconds. Findings: Erythema, lesion and rash present. Rash is crusting and papular.           Neurological: General: No focal deficit present. Mental Status: She is alert and oriented to person, place, and time. Mental status is at baseline. Sensory: Sensation is intact. Motor: Motor function is intact. Coordination: Coordination is intact. Gait: Gait is intact. Psychiatric:         Attention and Perception: Attention and perception normal.         Mood and Affect: Mood and affect normal.         Speech: Speech normal.         Behavior: Behavior normal. Behavior is cooperative. Thought Content: Thought content normal.         Cognition and Memory: Cognition and memory normal.         Judgment: Judgment normal.                    this is what initial appearance is of rash and then becomes red and then larger with more itching       posterior left calf. Consider wound care if this opens. ASSESSMENT/PLAN:  1. Acute eczema  -     halobetasol (ULTRAVATE) 0.05 % cream; Apply topically 2 times daily for up to 2 weeks. After this, would need to use lower potency steroid cream, Disp-1 Tube, R-0, Normal  Use claritin or allegra daily as well  Drink plenty of fluids  Minimize itching- cool compresses  Take cool showers or rinse with cool water at the end of shower or bath  Consider changing lotions/soaps/detergents/sunscreens to mild hypoallergenic  2. Venous insufficiency of left leg    No need for wound care at this time   Keep close eye for venous ulcer opening      Care Gaps Addressed  Call insurance company to discuss coverage for shingles vaccine (Shingrix) 2 dose series   AWV scheduled for fall 2021    I have reviewed patient's pertinent medical history, relevant laboratory and imaging studies, and past/future health maintenance. Discussed with the patient the importance of adhering to their current medication regimen as directed. Advised the patient that they should continue to work on eating a healthy balanced diet and staying active by exercising within their personal limits. Orders as listed above. Patient was advised to keep future appointments with their respective specialty care team(s). Patient had the opportunity to ask questions, all of which were answered to the best of my ability and with patient satisfaction. Patient understands and is agreeable with the care plan following today's visit. Patient is to schedule an appointment for any new or worsening symptoms. Go to ER for significant shortness of breath, chest pain, or uncontrolled pain or fever. I discussed with patient the risk and benefits of any medications that were prescribed today. I verified that the patient understands their medications, labs, and/or procedures. The patient is doing well with current medication regimen and does not have any barriers to adherence. The patient's self-management abilities are good. Return in about 2 months (around 9/20/2021) for Annual Wellness Visit. An electronic signature was used to authenticate this note.     --SABA Clement CNP on 7/20/2021 at 2:52 PM

## 2021-07-28 DIAGNOSIS — L30.9 ACUTE ECZEMA: ICD-10-CM

## 2021-07-28 NOTE — TELEPHONE ENCOUNTER
PT SON [facility]  CALLING FOR A REFILL ON THIS -  IT HAD NO REFILLS ON IT.    SHE USES IT FOR A SKIN RASH SHE HAS

## 2021-07-28 NOTE — TELEPHONE ENCOUNTER
Sent in a hydrocortisone cream that is less potent than the previous cream. She is going to cause discoloration and weakening of her skin with these ongoing steroid creams which is why I think dermatology would be best.     Give info for dermatology- [facility] [address] seems to get people in in a timely manner, as does [facility] [facility]    Octavio Bills, SABA - CNP

## 2021-07-28 NOTE — TELEPHONE ENCOUNTER
I CALLED PT SON, SHE HAS NOTICED SOME IMPROVEMENT BUT THE TUBE IS SO SMALL THEY THOUGHT IT WAS LIKE A SAMPLE IS THERE ANOTHER CREAM?

## 2021-07-28 NOTE — TELEPHONE ENCOUNTER
Cannot be used long term. Discussed this with patient and noted on instructions that she would need to resort to lower potency after 1-2 weeks. If this is continuing, she should consider seeing dermatologist. Has she noted any improvement in the past week?

## 2021-08-09 RX ORDER — SOTALOL HYDROCHLORIDE 80 MG/1
TABLET ORAL
Qty: 180 TABLET | Refills: 0 | Status: SHIPPED | OUTPATIENT
Start: 2021-08-09 | End: 2021-09-23 | Stop reason: SDUPTHER

## 2021-08-20 ENCOUNTER — TELEPHONE (OUTPATIENT)
Dept: FAMILY MEDICINE CLINIC | Age: 86
End: 2021-08-20

## 2021-08-20 DIAGNOSIS — L30.9 ACUTE ECZEMA: ICD-10-CM

## 2021-08-20 NOTE — TELEPHONE ENCOUNTER
Called pt son and left a detailed message advising     Called and spoke with pharmacy and they have a pound container that they can dispense. Gotten the okay from sarah and gave verbal to dispense 1 with 1 refill.  Hs

## 2021-08-20 NOTE — TELEPHONE ENCOUNTER
Pt son in law is calling to ask for a refill for pt on her   Hydrocortisone  It is not suppose to be filled for another =10 days. But the tubes are small and not lasting that long. Maybe a larger size -she has to use it on her arms legs and backs. It is like a trial size tube.     Jorge Pollack

## 2021-09-23 ENCOUNTER — VIRTUAL VISIT (OUTPATIENT)
Dept: FAMILY MEDICINE CLINIC | Age: 86
End: 2021-09-23
Payer: MEDICARE

## 2021-09-23 DIAGNOSIS — I50.42 CHRONIC COMBINED SYSTOLIC AND DIASTOLIC CONGESTIVE HEART FAILURE (HCC): ICD-10-CM

## 2021-09-23 DIAGNOSIS — I48.91 ATRIAL FIBRILLATION, UNSPECIFIED TYPE (HCC): ICD-10-CM

## 2021-09-23 DIAGNOSIS — I87.2 VENOUS (PERIPHERAL) INSUFFICIENCY: ICD-10-CM

## 2021-09-23 DIAGNOSIS — M47.817 DJD (DEGENERATIVE JOINT DISEASE), LUMBOSACRAL: ICD-10-CM

## 2021-09-23 DIAGNOSIS — J30.1 ALLERGIC RHINITIS DUE TO POLLEN, UNSPECIFIED SEASONALITY: ICD-10-CM

## 2021-09-23 DIAGNOSIS — Z00.00 MEDICARE ANNUAL WELLNESS VISIT, SUBSEQUENT: Primary | ICD-10-CM

## 2021-09-23 DIAGNOSIS — E78.00 HYPERCHOLESTEREMIA: ICD-10-CM

## 2021-09-23 DIAGNOSIS — E03.9 ACQUIRED HYPOTHYROIDISM: ICD-10-CM

## 2021-09-23 DIAGNOSIS — M47.812 SPONDYLOSIS OF CERVICAL REGION WITHOUT MYELOPATHY OR RADICULOPATHY: ICD-10-CM

## 2021-09-23 DIAGNOSIS — Z95.0 CARDIAC PACEMAKER IN SITU: ICD-10-CM

## 2021-09-23 DIAGNOSIS — Z00.00 ROUTINE GENERAL MEDICAL EXAMINATION AT A HEALTH CARE FACILITY: ICD-10-CM

## 2021-09-23 DIAGNOSIS — I49.5 SSS (SICK SINUS SYNDROME) (HCC): ICD-10-CM

## 2021-09-23 DIAGNOSIS — N18.30 STAGE 3 CHRONIC KIDNEY DISEASE, UNSPECIFIED WHETHER STAGE 3A OR 3B CKD (HCC): ICD-10-CM

## 2021-09-23 DIAGNOSIS — Z79.01 LONG TERM CURRENT USE OF ANTICOAGULANT THERAPY: ICD-10-CM

## 2021-09-23 DIAGNOSIS — M19.90 ARTHRITIS: ICD-10-CM

## 2021-09-23 DIAGNOSIS — Z85.09 HISTORY OF CANCER OF GALLBLADDER: ICD-10-CM

## 2021-09-23 PROBLEM — N28.9 RENAL INSUFFICIENCY: Status: RESOLVED | Noted: 2018-08-07 | Resolved: 2021-09-23

## 2021-09-23 PROBLEM — G93.41 ACUTE METABOLIC ENCEPHALOPATHY: Status: RESOLVED | Noted: 2019-11-10 | Resolved: 2021-09-23

## 2021-09-23 PROCEDURE — G8427 DOCREV CUR MEDS BY ELIG CLIN: HCPCS | Performed by: FAMILY MEDICINE

## 2021-09-23 PROCEDURE — 1090F PRES/ABSN URINE INCON ASSESS: CPT | Performed by: FAMILY MEDICINE

## 2021-09-23 PROCEDURE — G0439 PPPS, SUBSEQ VISIT: HCPCS | Performed by: FAMILY MEDICINE

## 2021-09-23 PROCEDURE — 99213 OFFICE O/P EST LOW 20 MIN: CPT | Performed by: FAMILY MEDICINE

## 2021-09-23 PROCEDURE — G8417 CALC BMI ABV UP PARAM F/U: HCPCS | Performed by: FAMILY MEDICINE

## 2021-09-23 PROCEDURE — 4040F PNEUMOC VAC/ADMIN/RCVD: CPT | Performed by: FAMILY MEDICINE

## 2021-09-23 PROCEDURE — 1123F ACP DISCUSS/DSCN MKR DOCD: CPT | Performed by: FAMILY MEDICINE

## 2021-09-23 PROCEDURE — 1036F TOBACCO NON-USER: CPT | Performed by: FAMILY MEDICINE

## 2021-09-23 RX ORDER — TRAMADOL HYDROCHLORIDE 50 MG/1
50 TABLET ORAL EVERY 6 HOURS PRN
Qty: 120 TABLET | Refills: 2 | Status: ON HOLD
Start: 2021-09-23 | End: 2021-10-15 | Stop reason: HOSPADM

## 2021-09-23 RX ORDER — SOTALOL HYDROCHLORIDE 80 MG/1
TABLET ORAL
Qty: 180 TABLET | Refills: 0 | Status: SHIPPED | OUTPATIENT
Start: 2021-09-23 | End: 2021-12-13

## 2021-09-23 RX ORDER — LEVOTHYROXINE SODIUM 0.07 MG/1
TABLET ORAL
Qty: 90 TABLET | Refills: 0 | Status: SHIPPED | OUTPATIENT
Start: 2021-09-23 | End: 2021-12-03

## 2021-09-23 ASSESSMENT — PATIENT HEALTH QUESTIONNAIRE - PHQ9
2. FEELING DOWN, DEPRESSED OR HOPELESS: 0
1. LITTLE INTEREST OR PLEASURE IN DOING THINGS: 0
SUM OF ALL RESPONSES TO PHQ QUESTIONS 1-9: 0
SUM OF ALL RESPONSES TO PHQ9 QUESTIONS 1 & 2: 0

## 2021-09-23 ASSESSMENT — LIFESTYLE VARIABLES: HOW OFTEN DO YOU HAVE A DRINK CONTAINING ALCOHOL: 0

## 2021-09-23 NOTE — PROGRESS NOTES
Medicare Annual Wellness Visit  Name: Alfredo Hartman Date: 2021   MRN: [id number] Sex: Female   Age: 80 y.o. Ethnicity: Non- / Non    : 3/31/1927 Race: White (non-)      [employer] is here for Lavelle Altman AWV (MEDICARE ANNUAL WELLNESS VISIT) and Pain (3 MONTH FOLLOW UP ON PAIN )    Going to dermatologist - localized rash on leg. Not much pain generally  Sleeping well  Appetite good  Weight not checked - may be up slightly. Mood fine   Seen by rheum in may  Very little physical activity - just when gets out to appointments. Putting on cream is workout for her  Moves around in house. Using walker in home whenever she goes out  occ uses w/c when goes long distance. Able to shower - gets help w/ back  Able to take care of personal needs. Lives w/ family - helping w/ cooking, housekeeping. Can't stand for long. Screenings for behavioral, psychosocial and functional/safety risks, and cognitive dysfunction are all negative except as indicated below. These results, as well as other patient data from the [address] form, are documented in Flowsheets linked to this Encounter. Allergies   Allergen Reactions    Demerol     Dilaudid [Hydromorphone Hcl]     Hydrochlorothiazide     Macrobid [Nitrofurantoin Monohydrate Macrocrystals]     Other      FRESH FROZEN PLASMA- CAUSED SWELLING OF EYES AND MOUTH, HIVES!  Sulfa Antibiotics     Tetanus Toxoids        Prior to Visit Medications    Medication Sig Taking? Authorizing Provider   rivaroxaban (XARELTO) 15 MG TABS tablet TAKE 1 TABLET BY MOUTH  DAILY WITH BREAKFAST Yes Janett Oliva MD   spironolactone (ALDACTONE) 25 MG tablet TAKE 1 TABLET BY MOUTH  DAILY Yes Janett Oliva MD   hydrocortisone 2.5 % cream Apply topically 2 times daily.  Yes SABA Ferrell - CNP   sotalol (BETAPACE) 80 MG tablet TAKE 1 TABLET BY MOUTH  TWICE DAILY Yes Janett Oliva MD   diclofenac sodium (VOLTAREN) 1 % GEL Apply 4 grams to lower extremity joints and 2 grams to upper extremity joints as needed 4 times/day Yes Abdirahman Mckenzie MD   gentamicin (GARAMYCIN) 0.1 % cream Apply topically daily. Yes SABA Beckwith CNP   tiZANidine (ZANAFLEX) 4 MG tablet 1 tab nightly and 1/2 tab during day prn muscle spasm/ tightness Yes Chaim Velasco MD   traMADol (ULTRAM) 50 MG tablet Take 1-2 tablets by mouth every 6 hours as needed for Pain for up to 30 days. Yes Chaim Velasco MD   furosemide (LASIX) 40 MG tablet TAKE 1 TABLET BY MOUTH  DAILY AS NEEDED FOR  SWELLING/ WEIGHT GAIN Yes Chaim Velasco MD   levothyroxine (SYNTHROID) 75 MCG tablet TAKE 1 TABLET BY MOUTH  DAILY Yes SABA Yu CNP   ipratropium (ATROVENT) 0.06 % nasal spray SPRAY ONE TO TWO SPRAYS IN EACH NOSTRIL THREE TIMES A DAY AS NEEDED FOR RHINITIS Yes SABA Yu CNP   losartan (COZAAR) 100 MG tablet TAKE ONE TABLET BY MOUTH ONCE NIGHTLY Yes SABA Yu CNP   omeprazole 20 MG EC tablet Take 20 mg by mouth daily Yes Historical Provider, MD   Multiple Vitamins-Minerals (THERAPEUTIC MULTIVITAMIN-MINERALS) tablet Take 1 tablet by mouth daily. Yes Historical Provider, MD   diphenhydrAMINE (BENADRYL) 25 MG tablet Take 25 mg by mouth nightly as needed.    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    CareTeam (Including outside providers/suppliers regularly involved in providing care):   Patient Care Team:  Sasha Montague MD as PCP - General (Family Medicine)  Sasha Montague MD as PCP - [facility] Empaneled Provider    Wt Readings from Last 3 Encounters:   07/20/21 161 lb (73 kg)   06/23/21 157 lb 9.6 oz (71.5 kg)   05/10/21 150 lb (68 kg)     Patient-Reported Vitals 9/23/2021   Patient-Reported Weight -   Patient-Reported Height 5'   Patient-Reported Systolic -   Patient-Reported Diastolic -   Patient-Reported Temperature -      There is no height or weight on file to calculate BMI. Based upon direct observation of the patient, evaluation of cognition reveals remote memory intact, recent memory impaired. Patient's complete Health Risk Assessment and screening values have been reviewed and are found in Flowsheets. The following problems were reviewed today and where indicated follow up appointments were made and/or referrals ordered. Positive Risk Factor Screenings with Interventions:      Cognitive:   Words recalled: 1 Word Recalled  Total Score Interpretation: Positive Mini-Cog  Did the patient refuse to take the cognition test?: No  Cognitive Impairment Interventions:  · lifestyle aids d/w pt - consider slums and further eval/ tx if progressing signifficantly        Health Habits/Nutrition:  Health Habits/Nutrition  Do you exercise for at least 20 minutes 2-3 times per week?: (!) No  Have you lost any weight without trying in the past 3 months?: No  Do you eat only one meal per day?: No  Have you seen the dentist within the past year?: (!) No     Health Habits/Nutrition Interventions:  · diet/ activity d/w pt    Hearing/Vision:  No exam data present  Hearing/Vision  Do you or your family notice any trouble with your hearing that hasn't been managed with hearing aids?: No  Do you have difficulty driving, watching TV, or doing any of your daily activities because of your eyesight?: No  Have you had an eye exam within the past year?: (!) No  Hearing/Vision Interventions:  · hearing/ vision eval d/w pt     ADL:  ADLs  In the past 7 days, did you need help from others to perform any of the following everyday activities? Eating, dressing, grooming, bathing, toileting, or walking/balance?: None  In the past 7 days, did you need help from others to take care of any of the following? Laundry, housekeeping, banking/finances, shopping, telephone use, food preparation, transportation, or taking medications?: [employer], Shopping, Laundry, Housekeeping, Banking/Finances  ADL Interventions:  · support for patient reviewed - lives w/ family who provide above needs    Personalized Preventive Plan   Current Health Maintenance Status  Immunization History   Administered Date(s) Administered    COVID-19, Moderna, PF, 100mcg/0.5mL 02/05/2021, 03/05/2021    Influenza Vaccine, unspecified formulation 10/30/2008, 10/21/2010    Influenza, High Dose (Fluzone 65 yrs and older) 10/20/2011, 09/18/2012, 09/26/2013, 10/23/2014, 10/15/2015, 11/22/2016, 09/26/2017, 11/08/2018    Influenza, Triv, inactivated, subunit, adjuvanted, IM (Fluad 65 yrs and older) 11/07/2019, 12/03/2020    Pneumococcal Conjugate 13-valent ([id number]) 11/22/2016    Pneumococcal Polysaccharide ([id number]) 01/04/2018    Zoster Live (Zostavax) 11/13/2008        Health Maintenance   Topic Date Due    Shingles Vaccine (2 of 3) 01/08/2009    Annual Wellness Visit (AWV)  Never done    Flu vaccine (1) 09/01/2021    Potassium monitoring  11/12/2021    Creatinine monitoring  11/12/2021    TSH testing  04/14/2022    Pneumococcal 65+ years Vaccine  Completed    COVID-19 Vaccine  Completed    Hepatitis A vaccine  Aged Out    Hepatitis B vaccine  Aged Out    Hib vaccine  Aged Out    Meningococcal (ACWY) vaccine  Aged Out     Recommendations for [place] Due: see orders and patient instructions/AVS.  .   Recommended screening schedule for the next 5-10 years is provided to the patient in written form: see Patient Instructions/AVS.   Diagnosis Orders   1. Medicare annual wellness visit, subsequent     2. Routine general medical examination at a health care facility     3. Allergic rhinitis due to pollen, unspecified seasonality     4. Arthritis     5. Atrial fibrillation, unspecified type ([facility]     6. Cardiac pacemaker in situ     7. Chronic combined systolic and diastolic congestive heart failure ([facility]     8. Stage 3 chronic kidney disease, unspecified whether stage 3a or 3b CKD ([facility]     9. Spondylosis of cervical region without myelopathy or radiculopathy     10. DJD (degenerative joint disease), lumbosacral     11. History of cancer of gallbladder     12. Acquired hypothyroidism     13. Hypercholesteremia     14. Long term current use of anticoagulant therapy     15. SSS (sick sinus syndrome) ([facility]     16. Venous (peripheral) insufficiency       F/u cardio regarding pacer and routine check of afib on xarelto - tolerating okay     Memory changes dw/ pt - keep mind active and try to move as much as possible even in chair to exercise muscles and keep joints mobile  Labs reviewed  Thyroid okay in April - cpm  Recheck labs in 6 months  Refill tramadol used 1-2/d - for oa/ ddd pain  Seen by rheum  F/u derm for rash on legs  Immunizations reviewed - flu shot soon  oarrs reviewed and results c/w rx'd meds    Kameron Sanchez is a 80 y.o. female being evaluated by a Virtual Visit (video and audio) encounter to address concerns as mentioned above. A caregiver was present when appropriate. Due to this being a TeleHealth encounter (During WUCOE-93 public health emergency), evaluation of the following organ systems was limited: Vitals/Constitutional/EENT/Resp/CV/GI//MS/Neuro/Skin/Heme-Lymph-Imm.   Pursuant to the emergency declaration under the [address] [place] [address] and Response Supplemental Appropriations Act, this Virtual Visit was conducted with patient's (and/or legal guardian's) consent, to reduce the patient's risk of exposure to COVID-19 and provide necessary medical care. The patient (and/or legal guardian) has also been advised to contact this office for worsening conditions or problems, and seek emergency medical treatment and/or call 911 if deemed necessary. Patient identification was verified at the start of the visit: Yes    Services were provided through a video synchronous discussion virtually to substitute for in-person clinic visit. Patient and provider were located at their individual homes. --Reginald Negrete MD on 9/23/2021 at 1:08 PM    An electronic signature was used to authenticate this note.

## 2021-09-23 NOTE — PATIENT INSTRUCTIONS
Personalized Preventive Plan for [employer] - 9/23/2021  Medicare offers a range of preventive health benefits. Some of the tests and screenings are paid in full while other may be subject to a deductible, co-insurance, and/or copay. Some of these benefits include a comprehensive review of your medical history including lifestyle, illnesses that may run in your family, and various assessments and screenings as appropriate. After reviewing your medical record and screening and assessments performed today your provider may have ordered immunizations, labs, imaging, and/or referrals for you. A list of these orders (if applicable) as well as your Preventive Care list are included within your After Visit Summary for your review. Other Preventive Recommendations:    · A preventive eye exam performed by an eye specialist is recommended every 1-2 years to screen for glaucoma; cataracts, macular degeneration, and other eye disorders. · A preventive dental visit is recommended every 6 months. · Try to get at least 150 minutes of exercise per week or 10,000 steps per day on a pedometer . · Order or download the FREE \"Exercise & Physical Activity: Your Everyday Guide\" from [employer]. Call 1-705.146.1201 or search [employer] online. · You need 0000-7741 mg of calcium and 8152-9524 IU of vitamin D per day. It is possible to meet your calcium requirement with diet alone, but a vitamin D supplement is usually necessary to meet this goal.  · When exposed to the sun, use a sunscreen that protects against both UVA and UVB radiation with an SPF of 30 or greater. Reapply every 2 to 3 hours or after sweating, drying off with a towel, or swimming. · Always wear a seat belt when traveling in a car. Always wear a helmet when riding a bicycle or motorcycle.

## 2021-10-11 RX ORDER — IPRATROPIUM BROMIDE 42 UG/1
SPRAY, METERED NASAL
Qty: 15 ML | Refills: 1 | Status: SHIPPED | OUTPATIENT
Start: 2021-10-11

## 2021-10-14 ENCOUNTER — APPOINTMENT (OUTPATIENT)
Dept: CT IMAGING | Age: 86
End: 2021-10-14
Payer: MEDICARE

## 2021-10-14 ENCOUNTER — HOSPITAL ENCOUNTER (OUTPATIENT)
Age: 86
Setting detail: OBSERVATION
Discharge: HOME | End: 2021-10-15
Attending: EMERGENCY MEDICINE | Admitting: FAMILY MEDICINE
Payer: MEDICARE

## 2021-10-14 ENCOUNTER — APPOINTMENT (OUTPATIENT)
Dept: GENERAL RADIOLOGY | Age: 86
End: 2021-10-14
Payer: MEDICARE

## 2021-10-14 ENCOUNTER — APPOINTMENT (OUTPATIENT)
Dept: MRI IMAGING | Age: 86
End: 2021-10-14
Payer: MEDICARE

## 2021-10-14 DIAGNOSIS — G45.9 TIA (TRANSIENT ISCHEMIC ATTACK): Primary | ICD-10-CM

## 2021-10-14 LAB
A/G RATIO: 1.2 (ref 1.1–2.2)
ALBUMIN SERPL-MCNC: 4.3 G/DL (ref 3.4–5)
ALP BLD-CCNC: 102 U/L (ref 40–129)
ALT SERPL-CCNC: 13 U/L (ref 10–40)
ANION GAP SERPL CALCULATED.3IONS-SCNC: 12 MMOL/L (ref 3–16)
AST SERPL-CCNC: 27 U/L (ref 15–37)
BASOPHILS ABSOLUTE: 0 K/UL (ref 0–0.2)
BASOPHILS RELATIVE PERCENT: 0.4 %
BILIRUB SERPL-MCNC: 0.6 MG/DL (ref 0–1)
BILIRUBIN URINE: NEGATIVE
BLOOD, URINE: NEGATIVE
BUN BLDV-MCNC: 26 MG/DL (ref 7–20)
CALCIUM SERPL-MCNC: 10.3 MG/DL (ref 8.3–10.6)
CHLORIDE BLD-SCNC: 100 MMOL/L (ref 99–110)
CLARITY: CLEAR
CO2: 26 MMOL/L (ref 21–32)
COLOR: YELLOW
CREAT SERPL-MCNC: 1.2 MG/DL (ref 0.6–1.2)
EKG ATRIAL RATE: 64 BPM
EKG DIAGNOSIS: NORMAL
EKG Q-T INTERVAL: 484 MS
EKG QRS DURATION: 154 MS
EKG QTC CALCULATION (BAZETT): 507 MS
EKG R AXIS: 267 DEGREES
EKG T AXIS: 59 DEGREES
EKG VENTRICULAR RATE: 66 BPM
EOSINOPHILS ABSOLUTE: 0 K/UL (ref 0–0.6)
EOSINOPHILS RELATIVE PERCENT: 0.5 %
GFR AFRICAN AMERICAN: 51
GFR NON-AFRICAN AMERICAN: 42
GLOBULIN: 3.5 G/DL
GLUCOSE BLD-MCNC: 110 MG/DL (ref 70–99)
GLUCOSE BLD-MCNC: 120 MG/DL (ref 70–99)
GLUCOSE URINE: NEGATIVE MG/DL
HCT VFR BLD CALC: 40.3 % (ref 36–48)
HEMOGLOBIN: 13.1 G/DL (ref 12–16)
KETONES, URINE: NEGATIVE MG/DL
LEUKOCYTE ESTERASE, URINE: NEGATIVE
LYMPHOCYTES ABSOLUTE: 1.8 K/UL (ref 1–5.1)
LYMPHOCYTES RELATIVE PERCENT: 17.3 %
MCH RBC QN AUTO: 28.2 PG (ref 26–34)
MCHC RBC AUTO-ENTMCNC: 32.5 G/DL (ref 31–36)
MCV RBC AUTO: 86.8 FL (ref 80–100)
MICROSCOPIC EXAMINATION: NORMAL
MONOCYTES ABSOLUTE: 0.9 K/UL (ref 0–1.3)
MONOCYTES RELATIVE PERCENT: 8.9 %
NEUTROPHILS ABSOLUTE: 7.8 K/UL (ref 1.7–7.7)
NEUTROPHILS RELATIVE PERCENT: 72.9 %
NITRITE, URINE: NEGATIVE
PDW BLD-RTO: 14 % (ref 12.4–15.4)
PERFORMED ON: ABNORMAL
PH UA: 6 (ref 5–8)
PLATELET # BLD: 294 K/UL (ref 135–450)
PMV BLD AUTO: 6.6 FL (ref 5–10.5)
POTASSIUM REFLEX MAGNESIUM: 4.3 MMOL/L (ref 3.5–5.1)
PROTEIN UA: NEGATIVE MG/DL
RBC # BLD: 4.64 M/UL (ref 4–5.2)
SODIUM BLD-SCNC: 138 MMOL/L (ref 136–145)
SPECIFIC GRAVITY UA: >1.03 (ref 1–1.03)
TOTAL PROTEIN: 7.8 G/DL (ref 6.4–8.2)
TROPONIN: <0.01 NG/ML
URINE REFLEX TO CULTURE: NORMAL
URINE TYPE: NORMAL
UROBILINOGEN, URINE: 0.2 E.U./DL
WBC # BLD: 10.6 K/UL (ref 4–11)

## 2021-10-14 PROCEDURE — 70450 CT HEAD/BRAIN W/O DYE: CPT

## 2021-10-14 PROCEDURE — 71045 X-RAY EXAM CHEST 1 VIEW: CPT

## 2021-10-14 PROCEDURE — 84484 ASSAY OF TROPONIN QUANT: CPT

## 2021-10-14 PROCEDURE — 36415 COLL VENOUS BLD VENIPUNCTURE: CPT

## 2021-10-14 PROCEDURE — 2580000003 HC RX 258: Performed by: FAMILY MEDICINE

## 2021-10-14 PROCEDURE — 94760 N-INVAS EAR/PLS OXIMETRY 1: CPT

## 2021-10-14 PROCEDURE — 93010 ELECTROCARDIOGRAM REPORT: CPT | Performed by: INTERNAL MEDICINE

## 2021-10-14 PROCEDURE — 70496 CT ANGIOGRAPHY HEAD: CPT

## 2021-10-14 PROCEDURE — 6360000004 HC RX CONTRAST MEDICATION: Performed by: EMERGENCY MEDICINE

## 2021-10-14 PROCEDURE — 85025 COMPLETE CBC W/AUTO DIFF WBC: CPT

## 2021-10-14 PROCEDURE — 2060000000 HC ICU INTERMEDIATE R&B

## 2021-10-14 PROCEDURE — 80053 COMPREHEN METABOLIC PANEL: CPT

## 2021-10-14 PROCEDURE — 99284 EMERGENCY DEPT VISIT MOD MDM: CPT

## 2021-10-14 PROCEDURE — 81003 URINALYSIS AUTO W/O SCOPE: CPT

## 2021-10-14 PROCEDURE — 6370000000 HC RX 637 (ALT 250 FOR IP): Performed by: FAMILY MEDICINE

## 2021-10-14 PROCEDURE — 93005 ELECTROCARDIOGRAM TRACING: CPT | Performed by: EMERGENCY MEDICINE

## 2021-10-14 RX ORDER — FUROSEMIDE 20 MG/1
20 TABLET ORAL DAILY
Status: DISCONTINUED | OUTPATIENT
Start: 2021-10-14 | End: 2021-10-15 | Stop reason: HOSPADM

## 2021-10-14 RX ORDER — SODIUM CHLORIDE 0.9 % (FLUSH) 0.9 %
5-40 SYRINGE (ML) INJECTION PRN
Status: DISCONTINUED | OUTPATIENT
Start: 2021-10-14 | End: 2021-10-15 | Stop reason: HOSPADM

## 2021-10-14 RX ORDER — LEVOTHYROXINE SODIUM 0.07 MG/1
75 TABLET ORAL
Status: DISCONTINUED | OUTPATIENT
Start: 2021-10-15 | End: 2021-10-15 | Stop reason: HOSPADM

## 2021-10-14 RX ORDER — POLYETHYLENE GLYCOL 3350 17 G/17G
17 POWDER, FOR SOLUTION ORAL DAILY PRN
Status: DISCONTINUED | OUTPATIENT
Start: 2021-10-14 | End: 2021-10-15 | Stop reason: HOSPADM

## 2021-10-14 RX ORDER — ROSUVASTATIN CALCIUM 10 MG/1
10 TABLET, COATED ORAL NIGHTLY
Status: DISCONTINUED | OUTPATIENT
Start: 2021-10-14 | End: 2021-10-15 | Stop reason: HOSPADM

## 2021-10-14 RX ORDER — SODIUM CHLORIDE 9 MG/ML
25 INJECTION, SOLUTION INTRAVENOUS PRN
Status: DISCONTINUED | OUTPATIENT
Start: 2021-10-14 | End: 2021-10-15 | Stop reason: HOSPADM

## 2021-10-14 RX ORDER — ONDANSETRON 2 MG/ML
4 INJECTION INTRAMUSCULAR; INTRAVENOUS EVERY 6 HOURS PRN
Status: DISCONTINUED | OUTPATIENT
Start: 2021-10-14 | End: 2021-10-15 | Stop reason: HOSPADM

## 2021-10-14 RX ORDER — ONDANSETRON 4 MG/1
4 TABLET, ORALLY DISINTEGRATING ORAL EVERY 8 HOURS PRN
Status: DISCONTINUED | OUTPATIENT
Start: 2021-10-14 | End: 2021-10-15 | Stop reason: HOSPADM

## 2021-10-14 RX ORDER — ASPIRIN 300 MG/1
300 SUPPOSITORY RECTAL DAILY
Status: DISCONTINUED | OUTPATIENT
Start: 2021-10-14 | End: 2021-10-15 | Stop reason: HOSPADM

## 2021-10-14 RX ORDER — LABETALOL HYDROCHLORIDE 5 MG/ML
10 INJECTION, SOLUTION INTRAVENOUS EVERY 10 MIN PRN
Status: DISCONTINUED | OUTPATIENT
Start: 2021-10-14 | End: 2021-10-15 | Stop reason: HOSPADM

## 2021-10-14 RX ORDER — ASPIRIN 81 MG/1
81 TABLET ORAL DAILY
Status: DISCONTINUED | OUTPATIENT
Start: 2021-10-14 | End: 2021-10-15 | Stop reason: HOSPADM

## 2021-10-14 RX ORDER — SODIUM CHLORIDE 0.9 % (FLUSH) 0.9 %
5-40 SYRINGE (ML) INJECTION EVERY 12 HOURS SCHEDULED
Status: DISCONTINUED | OUTPATIENT
Start: 2021-10-14 | End: 2021-10-15 | Stop reason: HOSPADM

## 2021-10-14 RX ORDER — SPIRONOLACTONE 25 MG/1
25 TABLET ORAL DAILY
Status: DISCONTINUED | OUTPATIENT
Start: 2021-10-14 | End: 2021-10-14 | Stop reason: ALTCHOICE

## 2021-10-14 RX ADMIN — ASPIRIN 81 MG: 81 TABLET, COATED ORAL at 20:01

## 2021-10-14 RX ADMIN — IOPAMIDOL 75 ML: 755 INJECTION, SOLUTION INTRAVENOUS at 13:37

## 2021-10-14 RX ADMIN — ROSUVASTATIN 10 MG: 10 TABLET, FILM COATED ORAL at 19:55

## 2021-10-14 RX ADMIN — Medication 10 ML: at 19:56

## 2021-10-14 RX ADMIN — FUROSEMIDE 20 MG: 20 TABLET ORAL at 19:56

## 2021-10-14 ASSESSMENT — PAIN SCALES - GENERAL
PAINLEVEL_OUTOF10: 0
PAINLEVEL_OUTOF10: 0

## 2021-10-14 NOTE — ED NOTES
Bed: 24  Expected date:   Expected time:   Means of arrival:   Comments:  [address], RN  10/14/21 4353

## 2021-10-14 NOTE — ED PROVIDER NOTES
[address] [facility]      Pt Name: Montserrat Macdonald  MRN: [id number]  [place] 3/31/1927  Date ofevaluation: 10/14/2021  Provider: Roxanne Gan MD    CHIEF COMPLAINT       Chief Complaint   Patient presents with    Transient Ischemic Attack     patient states approx 90 minutes ago was at the [employer] and noticed L eye droop, symptoms resolved at this time, \"just feels off\"       HPI    HISTORY OF PRESENT ILLNESS   (Location/Symptom, Timing/Onset,Context/Setting, Quality, Duration, Modifying Factors, Severity)  Note limiting factors. Montserrat Macdonald is a 80 y.o. female who presents to the emergency department after some left-sided facial weakness. This is a 51-year-old female who had a transient episode of left-sided facial and eye weakness. She states around noon this started and she feels better currently. I saw the patient an hour and a half after this happened. She denies any history of strokes. She denies any numbness or paresthesias currently. NursingNotes were reviewed. Review of Systems    REVIEW OF SYSTEMS    (2-9 systems for level 4, 10 or more for level 5)     Review of Systems   Constitutional: Negative for fever. HENT: Negative for rhinorrhea and sore throat. Eyes: Negative for redness. Respiratory: Negative for shortness of breath. Cardiovascular: Negative for chest pain. Gastrointestinal: Negative for abdominal pain. Genitourinary: Negative for flank pain. Neurological: Negative for headaches. Positive for left-sided facial weakness. Hematological: Negative for adenopathy. Psychiatric/Behavioral: Negative for confusion. Except as noted above the remainder of the review of systems was reviewed and negative.        PAST MEDICAL HISTORY     Past Medical History:   Diagnosis Date    Atrial fibrillation ([facility]     Cancer ([facility]     gallbladder    Chronic ulcer of right great toe with fat layer exposed ([facility] 2/18/2021    Hypertension     Hypothyroidism     Leg DVT (deep venous thromboembolism), acute ([facility]     Osteoarthritis     PE (pulmonary embolism)     Scoliosis          SURGICALHISTORY       Past Surgical History:   Procedure Laterality Date   Slim Cheese, LAPAROSCOPIC  0,33,4433    with cholangiogram    ERCP  6/17/2013    with Sphinchterotomy/Stone Removal    HYSTERECTOMY      PACEMAKER INSERTION      UPPER GASTROINTESTINAL ENDOSCOPY  10/21/2016    esophageal tear         CURRENT MEDICATIONS       Previous Medications    DICLOFENAC SODIUM (VOLTAREN) 1 % GEL    Apply 4 grams to lower extremity joints and 2 grams to upper extremity joints as needed 4 times/day    DIPHENHYDRAMINE (BENADRYL) 25 MG TABLET    Take 25 mg by mouth nightly as needed. FUROSEMIDE (LASIX) 40 MG TABLET    TAKE 1 TABLET BY MOUTH  DAILY AS NEEDED FOR  SWELLING/ WEIGHT GAIN    GENTAMICIN (GARAMYCIN) 0.1 % CREAM    Apply topically daily. HYDROCORTISONE 2.5 % CREAM    Apply topically 2 times daily. IPRATROPIUM (ATROVENT) 0.06 % NASAL SPRAY    SPRAY ONE TO TWO SPRAYS INTO EACH NOSTRIL THREE TIMES A DAY AS NEEDED FOR RHINITIS    LEVOTHYROXINE (SYNTHROID) 75 MCG TABLET    TAKE 1 TABLET BY MOUTH  DAILY    LOSARTAN (COZAAR) 100 MG TABLET    TAKE ONE TABLET BY MOUTH ONCE NIGHTLY    MULTIPLE VITAMINS-MINERALS (THERAPEUTIC MULTIVITAMIN-MINERALS) TABLET    Take 1 tablet by mouth daily. OMEPRAZOLE 20 MG EC TABLET    Take 20 mg by mouth daily    RIVAROXABAN (XARELTO) 15 MG TABS TABLET    TAKE 1 TABLET BY MOUTH  DAILY WITH BREAKFAST    SOTALOL (BETAPACE) 80 MG TABLET    TAKE 1 TABLET BY MOUTH  TWICE DAILY    SPIRONOLACTONE (ALDACTONE) 25 MG TABLET    TAKE 1 TABLET BY MOUTH  DAILY    TIZANIDINE (ZANAFLEX) 4 MG TABLET    1 tab nightly and 1/2 tab during day prn muscle spasm/ tightness    TRAMADOL (ULTRAM) 50 MG TABLET    Take 1-2 tablets by mouth every 6 hours as needed for Pain for up to 30 days.     TRAMADOL (ULTRAM) 50 MG TABLET    Take 1 tablet by mouth every 6 hours as needed for Pain for up to 30 days. ALLERGIES     Demerol, Dilaudid [hydromorphone hcl], Hydrochlorothiazide, Macrobid [nitrofurantoin monohydrate macrocrystals], Other, Sulfa antibiotics, and Tetanus toxoids    FAMILY HISTORY     History reviewed. No pertinent family history. SOCIAL HISTORY       Social History     Socioeconomic History    Marital status:      Spouse name: None    Number of children: None    Years of education: None    Highest education level: None   Occupational History    None   Tobacco Use    Smoking status: Former Smoker     Quit date: 6/15/1965     Years since quittin.3    Smokeless tobacco: Never Used   Substance and Sexual Activity    Alcohol use: No    Drug use: No    Sexual activity: None   Other Topics Concern    None   Social History Narrative    None     Social Determinants of Health     Financial Resource Strain: Low Risk     Difficulty of Paying Living Expenses: Not hard at all   Food Insecurity: No Food Insecurity    Worried About Running Out of Food in the Last Year: Never true    [address] in the Last Year: Never true   Transportation Needs:     Lack of Transportation (Medical):      Lack of Transportation (Non-Medical):    Physical Activity:     Days of Exercise per Week:     Minutes of Exercise per Session:    Stress:     Feeling of Stress :    Social Connections:     Frequency of Communication with Friends and Family:     Frequency of Social Gatherings with Friends and Family:     Attends L[employer] Services:     Active Member of Clubs or Organizations:     Attends Club or Organization Meetings:     Marital Status:    Intimate Partner Violence:     Fear of Current or Ex-Partner:     Emotionally Abused:     Physically Abused:     Sexually Abused:        SCREENINGS   NIH Stroke Scale  Interval: Baseline  Level of Consciousness (1a. ): Alert  LOC Questions (1b. ): Answers both correctly  LOC Commands (1c. ): Performs both tasks correctly  Best Gaze (2. ): Normal  Visual (3. ): No visual loss  Facial Palsy (4. ): Normal symmetrical movement  Motor Arm, Left (5a. ): No drift  Motor Arm, Right (5b. ): No drift  Motor Leg, Left (6a. ): No drift  Motor Leg, Right (6b. ): No drift  Limb Ataxia (7. ): Absent  Sensory (8. ): Normal  Best Language (9. ): No aphasia  Dysarthria (10. ): Normal  Extinction and Inattention (11): No abnormality  Total: 0Glasgow Coma Scale  Eye Opening: Spontaneous  Best Verbal Response: Oriented  Best Motor Response: Obeys commands  [place] Coma Scale Score: 15        PHYSICAL EXAM    (up to 7 for level 4, 8 or more for level 5)     ED Triage Vitals   BP Temp Temp Source Pulse Resp SpO2 Height Weight   10/14/21 1340 10/14/21 1353 10/14/21 1353 10/14/21 1340 10/14/21 1340 10/14/21 1340 10/14/21 1353 10/14/21 1353   (!) 158/76 98.1 °F (36.7 °C) Oral 65 16 95 % 5' 5\" (1.651 m) 155 lb (70.3 kg)       Physical Exam:      General Appearance:  Alert, cooperative, appears stated age. Head:  Normocephalic, without obvious abnormality, atraumatic. Eyes:  conjunctiva/corneas clear, EOM's intact. Sclera anicteric. ENT:  Mucous remains moist and pink   Neck: Supple, symmetrical, trachea midline, no adenopathy. No jugular venous distention. Lungs:    Clear to auscultation bilaterally   Chest Wall:   No pain to palpation   Heart:   Genitourinary:  Regular rate rhythm with no murmurs rubs gallops  Deferred   Abdomen:    Soft and benign. No guarding or rebound. Extremities:  No clubbing cyanosis or edema   Pulses:  Good throughout   Skin:  No rashes or lesions to exposed skin. Neurologic: Alert and oriented X 3. NIH score of 0. No left-sided facial weakness. Good smile. No asymmetry.        DIAGNOSTIC RESULTS     EKG: All EKG's are interpreted by the Emergency Department Physician who either signs or Co-signsthis chart in the absence of a cardiologist.    Paced rhythm at a rate of 66 beats a minute with no acute ST elevations or depressions or pathologic UAs. RADIOLOGY:   Non-plain filmimages such as CT, Ultrasound and MRI are read by the radiologist. Plain radiographic images are visualized and preliminarily interpreted by the emergency physician with the below findings:    See below    Interpretation per the Radiologist below, if available at the time ofthis note: All incidental findings were discussed with the patient. XR CHEST PORTABLE   Final Result   Right perihilar and left basilar atelectasis         CTA HEAD NECK W CONTRAST   Final Result   No CT evidence of an acute infarct. No large vessel occlusion visualized within the head or neck. This scan was analyzed using [employer] ai contact LVO. Identification of suspected   findings is not for diagnostic use beyond notification. Viz LVO is limited to   analysis of imaging data and should not be used in-lieu of full patient   evaluation or relied upon to make or confirm diagnosis. CT HEAD WO CONTRAST   Final Result   Addendum 1 of 1   ADDENDUM:   Critical results were called by Dr. Lindsay Perez. Aziza Burden MD to Dr. Elias Duvall    on   10/14/2021 at 14:00. Final   No acute intracranial abnormality. Moderate cerebral atrophy and moderate a large chronic ischemic changes both   appropriate for age. No significant change from the prior study.                   ED BEDSIDE ULTRASOUND:   Performed by ED Physician - none    LABS:  Labs Reviewed   CBC WITH AUTO DIFFERENTIAL - Abnormal; Notable for the following components:       Result Value    Neutrophils Absolute 7.8 (*)     All other components within normal limits    Narrative:     Performed at:  [facility] [address], [place], [address]   Phone [phone]   COMPREHENSIVE METABOLIC PANEL W/ REFLEX TO MG FOR LOW K - Abnormal; Notable for the following components:    Glucose 120 (*) BUN 26 (*)     GFR Non- 42 (*)     GFR  51 (*)     All other components within normal limits    Narrative:     Performed at:  [facility]  [facility], [address]   Phone [phone]   POCT GLUCOSE - Abnormal; Notable for the following components:    POC Glucose 110 (*)     All other components within normal limits    Narrative:     Performed at:  [facility]  [address] [facility], [address]   Phone [phone]   TROPONIN    Narrative:     Performed at:  [facility]  [address] [facility], [address]   Phone [phone]   URINE RT REFLEX TO CULTURE       All other labs were within normal range or not returned as of this dictation. EMERGENCY DEPARTMENT COURSE and DIFFERENTIAL DIAGNOSIS/MDM:   Vitals:    Vitals:    10/14/21 1415 10/14/21 1430 10/14/21 1500 10/14/21 1515   BP: (!) 147/61 (!) 154/70 (!) 152/72 (!) 158/72   Pulse: 62 62 60 61   Resp: 22 18 17 25   Temp:       TempSrc:       SpO2: 98% 98% 98% 97%   Weight:       Height:               MDM    The patient has remained stable throughout her hospital course. I activated the stroke team and discussed it with the  stroke team.  They did not feel intervention was warranted at this time. There were no disabling deficits and the patient is on Xarelto. The patient's medical work-up and cardiac work-up are otherwise unremarkable normal.  A CT of the patient's head and neck were unremarkable with no large vessel occlusions. CT of the head was normal for age. On reexamination she is neurologically intact. The patient be admitted for further care, TIA evaluation and further work-up as necessary. She is in stable condition. REASSESSMENT          CRITICAL CARE TIME   Total Critical Care time was 45 minutes, excluding separatelyreportable procedures.   There was a high probability ofclinically significant/life threatening deterioration in the patient's condition which required my urgent intervention. CONSULTS:  IP CONSULT TO HOSPITALIST    PROCEDURES:  Unless otherwise noted below, none     Procedures    FINAL IMPRESSION      1. TIA (transient ischemic attack)          DISPOSITION/PLAN   DISPOSITION Decision To Admit 10/14/2021 04:02:24 PM      PATIENT REFERREDTO:  No follow-up provider specified. DISCHARGEMEDICATIONS:  New Prescriptions    No medications on file     Controlled Substances Monitoring:     RX Monitoring 5/13/2019   Attestation The Prescription Monitoring Report for this patient was reviewed today.        (Please note that portions of this note were completed with a voice recognition program.  Efforts were made to edit the dictations but occasionally words are mis-transcribed.)    Jenn Vega MD (electronically signed)  Attending Emergency Physician          Jenn Vega MD  10/14/21 1042

## 2021-10-14 NOTE — ED NOTES
Report given to [facility]. Pt alert and oriented and shows no signs of distress at time of report.         Chris Fernández RN  10/14/21 5220

## 2021-10-14 NOTE — ED NOTES
Bed: 25  Expected date:   Expected time:   Means of arrival:   Comments:  [facility]     Elpidio Youngblood RN  10/14/21 5940

## 2021-10-15 VITALS
DIASTOLIC BLOOD PRESSURE: 79 MMHG | BODY MASS INDEX: 27.18 KG/M2 | WEIGHT: 163.14 LBS | HEIGHT: 65 IN | SYSTOLIC BLOOD PRESSURE: 127 MMHG | RESPIRATION RATE: 16 BRPM | HEART RATE: 78 BPM | OXYGEN SATURATION: 97 % | TEMPERATURE: 98.1 F

## 2021-10-15 LAB
CHOLESTEROL, TOTAL: 184 MG/DL (ref 0–199)
HCT VFR BLD CALC: 35.1 % (ref 36–48)
HDLC SERPL-MCNC: 50 MG/DL (ref 40–60)
HEMOGLOBIN: 11.6 G/DL (ref 12–16)
LDL CHOLESTEROL CALCULATED: 123 MG/DL
LV EF: 60 %
LVEF MODALITY: NORMAL
MCH RBC QN AUTO: 28.1 PG (ref 26–34)
MCHC RBC AUTO-ENTMCNC: 33.1 G/DL (ref 31–36)
MCV RBC AUTO: 84.8 FL (ref 80–100)
PDW BLD-RTO: 13.7 % (ref 12.4–15.4)
PLATELET # BLD: 254 K/UL (ref 135–450)
PMV BLD AUTO: 6.6 FL (ref 5–10.5)
RBC # BLD: 4.14 M/UL (ref 4–5.2)
TRIGL SERPL-MCNC: 53 MG/DL (ref 0–150)
VLDLC SERPL CALC-MCNC: 11 MG/DL
WBC # BLD: 7.7 K/UL (ref 4–11)

## 2021-10-15 PROCEDURE — 93306 TTE W/DOPPLER COMPLETE: CPT

## 2021-10-15 PROCEDURE — 83036 HEMOGLOBIN GLYCOSYLATED A1C: CPT

## 2021-10-15 PROCEDURE — 97161 PT EVAL LOW COMPLEX 20 MIN: CPT

## 2021-10-15 PROCEDURE — 97116 GAIT TRAINING THERAPY: CPT

## 2021-10-15 PROCEDURE — G0378 HOSPITAL OBSERVATION PER HR: HCPCS

## 2021-10-15 PROCEDURE — 36415 COLL VENOUS BLD VENIPUNCTURE: CPT

## 2021-10-15 PROCEDURE — 99222 1ST HOSP IP/OBS MODERATE 55: CPT | Performed by: PSYCHIATRY & NEUROLOGY

## 2021-10-15 PROCEDURE — 2580000003 HC RX 258: Performed by: FAMILY MEDICINE

## 2021-10-15 PROCEDURE — 85027 COMPLETE CBC AUTOMATED: CPT

## 2021-10-15 PROCEDURE — 97165 OT EVAL LOW COMPLEX 30 MIN: CPT

## 2021-10-15 PROCEDURE — 6370000000 HC RX 637 (ALT 250 FOR IP): Performed by: FAMILY MEDICINE

## 2021-10-15 PROCEDURE — 92610 EVALUATE SWALLOWING FUNCTION: CPT

## 2021-10-15 PROCEDURE — 80061 LIPID PANEL: CPT

## 2021-10-15 PROCEDURE — 92526 ORAL FUNCTION THERAPY: CPT

## 2021-10-15 PROCEDURE — 97535 SELF CARE MNGMENT TRAINING: CPT

## 2021-10-15 RX ADMIN — LEVOTHYROXINE SODIUM 75 MCG: 0.07 TABLET ORAL at 05:38

## 2021-10-15 RX ADMIN — ASPIRIN 81 MG: 81 TABLET, COATED ORAL at 09:38

## 2021-10-15 RX ADMIN — Medication 10 ML: at 09:39

## 2021-10-15 RX ADMIN — FUROSEMIDE 20 MG: 20 TABLET ORAL at 09:38

## 2021-10-15 ASSESSMENT — PAIN SCALES - GENERAL: PAINLEVEL_OUTOF10: 0

## 2021-10-15 NOTE — PROGRESS NOTES
Physical Therapy    Facility/Department: [address]  Initial Assessment    NAME: Stephanie Naranjo  : 3/31/1927  MRN: [id number]    Date of Service: 10/15/2021    Discharge Recommendations: Stephanie Naranjo scored a 23/24 on the AM-PAC short mobility form. At this time, no further PT is recommended upon discharge due to pt at baseline functional level. Recommend patient returns to prior setting with prior services. PT Equipment Recommendations  Equipment Needed: No    Assessment   Body structures, Functions, Activity limitations: Decreased functional mobility ; Decreased posture  Assessment: Pt is at baseline functioning. No continued therapy needed at this time. Treatment Diagnosis: decreased functional mobility, decreased posture  Decision Making: Low Complexity  Clinical Presentation: stable  PT Education: Goals;PT Role;Plan of Care;Home Exercise Program  Patient Education: discharge recommendations discussed. Pt verbalized understanding  Barriers to Learning: hearing  REQUIRES PT FOLLOW UP: No  Activity Tolerance  Activity Tolerance: Patient Tolerated treatment well       Patient Diagnosis(es): The encounter diagnosis was TIA (transient ischemic attack). has a past medical history of Atrial fibrillation ([address] Cancer ([address] Chronic ulcer of right great toe with fat layer exposed ([address] Hypertension, Hypothyroidism, Leg DVT (deep venous thromboembolism), acute ([address] Osteoarthritis, PE (pulmonary embolism), and Scoliosis. has a past surgical history that includes Hysterectomy; Pacemaker insertion; ERCP (2013); Cholecystectomy, laparoscopic (8,22,8621); and Upper gastrointestinal endoscopy (10/21/2016).     Restrictions  Restrictions/Precautions  Restrictions/Precautions: Fall Risk (high fall risk)  Implants present? : Pacemaker  Position Activity Restriction  Other position/activity restrictions: Stephanie Naranjo is a 80 y.o. female with h/o HTN , Afib , Chronic anticoagulation , CHF, cardiac pacemaker in place presented with c/o left sided facial weakness and change in vision . Symptoms started earlier today when she was at the beauty salon. Symptoms resolved by the time pt arrived to the ED. Denies h/o stroke. No other focal neurological deficit. Stroke alert was called. CT head was neg for hemorrhage . Showed chronic ischemic changes . CTA head and neck did not show any flow limiting stenosis  Vision/Hearing  Vision: Impaired  Vision Exceptions: Wears glasses for reading  Hearing: Exceptions to [facility]  Hearing Exceptions: Bilateral hearing aid     Subjective  General  Chart Reviewed: Yes  Patient assessed for rehabilitation services?: Yes  Response To Previous Treatment: Not applicable  Family / Caregiver Present: No  Follows Commands: Within Functional Limits  General Comment  Comments: Pt supine in bed upon arrival. Pt agreeable to therapy but reports \"I dont need therapy\"  Subjective  Subjective: Pt reports no pain at this time.   Pain Screening  Patient Currently in Pain: Denies  Vital Signs  Patient Currently in Pain: Denies       Orientation  Orientation  Overall Orientation Status: Within Functional Limits  Social/Functional History  Social/Functional History  Lives With: Family (son and daughter in law)  Home Access: Stairs to enter with rails  Entrance Stairs - Number of Steps: 2 ALYSIA  Entrance Stairs - Rails: Right  Bathroom Shower/Tub: Walk-in shower  Bathroom Toilet: Handicap height  Bathroom Equipment: Grab bars in shower  Bathroom Accessibility: Accessible  Home Equipment: 4 wheeled walker  Receives Help From: Family  ADL Assistance: Independent  Homemaking Assistance: Needs assistance (son and daughter in law take care of house)  Ambulation Assistance: Independent  Transfer Assistance: Independent  Active : No  Patient's  Info: Family takes pt to MD appointments  Type of occupation: retired pharmacist  Additional Comments: no falls in past 6 months  Cognition   Cognition  Overall Cognitive Status: WNL    Objective     Observation/Palpation  Posture: Poor (scoliosis, significant foward flexed posture)       Strength RLE  Strength RLE: WFL  Strength LLE  Strength LLE: WFL  Tone RLE  RLE Tone: Normotonic  Tone LLE  LLE Tone: Normotonic  Sensation  Overall Sensation Status: WFL  Bed mobility  Supine to Sit: Stand by assistance  Scooting: Stand by assistance  Comment: HOB elevated, no HRs  Transfers  Sit to Stand: Stand by assistance (min A from toilet. Pt has raised toilet in home)  Stand to sit: Stand by assistance  Ambulation  Ambulation?: Yes  Ambulation 1  Surface: level tile  Device: Rolling Walker  Assistance: Stand by assistance  Quality of Gait: significant forward flexed posture, narrow ANAY, decreased cordelia, decreased step height  Gait Deviations: Slow Cordelia;Decreased step length;Decreased step height  Distance: 10 ft +50ft  Comments: Pt ambulates 10 ft + 50ft with RW and SBA. Stairs/Curb  Stairs?: No     Balance  Posture: Poor (scoliosis, forward flexed posture)  Sitting - Static: Good  Sitting - Dynamic: Good  Standing - Static: Good; - (with RW, SBA)  Standing - Dynamic: Fair;+ (with RW, SBA)  Exercises  Hip Flexion: seated marches x10  Knee Long Arc Quad: seated LAQ x10  Ankle Pumps: seated ankle pumps x20     Plan   Plan  Times per week: d/c  Safety Devices  Type of devices:  All fall risk precautions in place, Call light within reach, Chair alarm in place, Gait belt, Patient at risk for falls, Left in chair, Nurse notified  Restraints  Initially in place: No    G-Code       OutComes Score                                                  AM-PAC Score  AM-PAC Inpatient Mobility Raw Score : 23 (10/15/21 0855)  AM-PAC Inpatient T-Scale Score : 56.93 (10/15/21 0855)  Mobility Inpatient CMS 0-100% Score: 11.2 (10/15/21 0855)  Mobility Inpatient CMS G-Code Modifier : CI (10/15/21 0855)          Goals  Short term goals  Time Frame for Short term goals: discharge       Therapy Time   Individual Concurrent Group Co-treatment   Time In [facility]         Time Out 0845         Minutes 23              Timed Code Treatment Minutes:   8    Total Treatment Minutes:  [address] [place] [address], 1726 Tomás Arce, DPT [id number]

## 2021-10-15 NOTE — DISCHARGE SUMMARY
Chronic anticoagulation, CHF, cardiac pacemaker in place presented with c/o left sided facial weakness and change in vision . Symptoms started earlier on 10/14 when she was at the beauty salon. Symptoms resolved by the time pt arrived to the ED. Denied h/o stroke or any other focal neurological deficit. Stroke alert was called. CT head was neg for hemorrhage but showed chronic ischemic changes . CTA head and neck did not show any flow limiting stenosis. TIA with Lt facial weakness: Resolved completely. CTH: No evidence of an acute infarct. CTA H&N: No large vessel occlusion visualized. Continue on Xarelto & Statin. Aspirin discontinued due to high risk of bleeding with dual therapy. Pacemaker is not MRI compatible  Neurology consulted: No Further recommendations. SLP evaluation completed: On regular diet. PT OT: Home recommended.      Chronic A. Fib:  Rate controlled, continued Xarelto. Hypothyroidism: Continued Synthroid. CKD 3: Renal function stable. Chronic systolic CHF:  No acute exacerbation. Continue home dose Lasix. Mild anemia: Hgb stable. Consults. IP CONSULT TO HOSPITALIST  IP CONSULT TO NEUROLOGY    Physical examination on discharge day. /72   Pulse 74   Temp 97.8 °F (36.6 °C) (Oral)   Resp 18   Ht 5' 5\" (1.651 m)   Wt 163 lb 2.3 oz (74 kg)   SpO2 96%   BMI 27.15 kg/m²   General appearance. Alert. Looks comfortable. HEENT. Sclera clear. Moist mucus membranes. Cardiovascular. Regular rate and rhythm, normal S1, S2. No murmur. Respiratory. Not using accessory muscles. Clear to auscultation bilaterally, no wheeze. Gastrointestinal. Abdomen soft, non-tender, not distended, normal bowel sounds  Neurology. Facial symmetry. No speech deficits. Moving all extremities equally. Extremities. No edema in lower extremities. Skin. Warm, dry, normal turgor      Condition at time of discharge: Stable. Medication instructions provided to patient at discharge. Medication List      CHANGE how you take these medications    traMADol 50 MG tablet  Commonly known as: ULTRAM  Take 1-2 tablets by mouth every 6 hours as needed for Pain for up to 30 days. What changed: Another medication with the same name was removed. Continue taking this medication, and follow the directions you see here. CONTINUE taking these medications    diclofenac sodium 1 % Gel  Commonly known as: VOLTAREN  Apply 4 grams to lower extremity joints and 2 grams to upper extremity joints as needed 4 times/day     diphenhydrAMINE 25 MG tablet  Commonly known as: BENADRYL     furosemide 40 MG tablet  Commonly known as: LASIX  TAKE 1 TABLET BY MOUTH  DAILY AS NEEDED FOR  SWELLING/ WEIGHT GAIN     gentamicin 0.1 % cream  Commonly known as: GARAMYCIN  Apply topically daily. hydrocortisone 2.5 % cream  Apply topically 2 times daily. ipratropium 0.06 % nasal spray  Commonly known as: ATROVENT  SPRAY ONE TO TWO SPRAYS INTO EACH NOSTRIL THREE TIMES A DAY AS NEEDED FOR RHINITIS     levothyroxine 75 MCG tablet  Commonly known as: SYNTHROID  TAKE 1 TABLET BY MOUTH  DAILY     losartan 100 MG tablet  Commonly known as: COZAAR  TAKE ONE TABLET BY MOUTH ONCE NIGHTLY     omeprazole 20 MG EC tablet     rivaroxaban 15 MG Tabs tablet  Commonly known as: Xarelto  TAKE 1 TABLET BY MOUTH  DAILY WITH BREAKFAST     sotalol 80 MG tablet  Commonly known as: BETAPACE  TAKE 1 TABLET BY MOUTH  TWICE DAILY     spironolactone 25 MG tablet  Commonly known as: ALDACTONE  TAKE 1 TABLET BY MOUTH  DAILY     therapeutic multivitamin-minerals tablet     tiZANidine 4 MG tablet  Commonly known as: Zanaflex  1 tab nightly and 1/2 tab during day prn muscle spasm/ tightness            Discharge recommendations given to patient. Follow Up. With PCP in 1 week   Disposition. home  Activity. activity as tolerated  Diet: ADULT DIET; Regular      Spent 35 minutes in discharge process.     Signed:  Reina Oshea MD     10/15/2021 12:35 PM

## 2021-10-15 NOTE — PROGRESS NOTES
Data- discharge order received, pt verbalized agreement to discharge, disposition to previous residence, no needs for HHC/DME. Action- discharge instructions prepared and given to pt and son in law, pt verbalized understanding. Medication information packet given r/t NEW and/or CHANGED prescriptions emphasizing name/purpose/side effects, pt verbalized understanding. Discharge instruction summary: Diet- general, Activity- as tolerated, Primary Care Physician as follows: Tobias Rivero [phone] f/u appointment to be made by pt, immunizations reviewed. 1. WEIGHT: Admit Weight: 155 lb (70.3 kg) (10/14/21 1353)        Today  Weight: 163 lb 2.3 oz (74 kg) (10/15/21 0755)       2. O2 SAT.: SpO2: 97 % (10/15/21 1330)    Response- Pt belongings gathered, IV removed. Disposition is home (no HHC/DME needs), transported with son in law, taken to lobby via w/c w/ student RN, no complications.

## 2021-10-15 NOTE — H&P
HOSPITALISTS HISTORY AND PHYSICAL    10/14/2021 9:34 PM    Patient Information:  Wing Gandara is a 80 y.o. female [id number]  PCP:  Katerine Gómez MD (Tel: [phone] )    Chief complaint:    Chief Complaint   Patient presents with    Transient Ischemic Attack     patient states approx 90 minutes ago was at the [employer] and noticed L eye droop, symptoms resolved at this time, \"just feels off\"        History of Present Illness:  Ayah Vivar is a 80 y.o. female with h/o HTN , Afib , Chronic anticoagulation , CHF, cardiac pacemaker in place presented with c/o left sided facial weakness and change in vision . Symptoms started earlier today when she was at the beauty salon. Symptoms resolved by the time pt arrived to the ED. Denies h/o stroke. No other focal neurological deficit. Stroke alert was called. CT head was neg for hemorrhage . Showed chronic ischemic changes . CTA head and neck did not show any flow limiting stenosis    REVIEW OF SYSTEMS:   Constitutional: Negative for fever,chills or night sweats  ENT: Negative for rhinorrhea, epistaxis, hoarseness, sore throat. Respiratory: Negative for shortness of breath,wheezing  Cardiovascular: Negative for chest pain, palpitations   Gastrointestinal: Negative for nausea, vomiting, diarrhea  Genitourinary: Negative for polyuria, dysuria   Hematologic/Lymphatic: Negative for bleeding tendency, easy bruising  Musculoskeletal: Negative for myalgias and arthralgias  Neurologic: Negative for confusion,dysarthria. Skin: Negative for itching,rash  Psychiatric: Negative for depression,anxiety, agitation. Endocrine: Negative for polydipsia,polyuria,heat /cold intolerance.     Past Medical History:   has a past medical history of Atrial fibrillation ([address] Cancer ([facility] [address] Chronic ulcer of right great toe with fat layer exposed ([address] Hypertension, Hypothyroidism, Leg DVT (deep venous thromboembolism), acute ([facility] [address] Osteoarthritis, PE (pulmonary embolism), and Scoliosis. Past Surgical History:   has a past surgical history that includes Hysterectomy; Pacemaker insertion; ERCP (6/17/2013); Cholecystectomy, laparoscopic (7,66,4621); and Upper gastrointestinal endoscopy (10/21/2016). Medications:  No current facility-administered medications on file prior to encounter. Current Outpatient Medications on File Prior to Encounter   Medication Sig Dispense Refill    ipratropium (ATROVENT) 0.06 % nasal spray SPRAY ONE TO TWO SPRAYS INTO EACH NOSTRIL THREE TIMES A DAY AS NEEDED FOR RHINITIS 15 mL 1    traMADol (ULTRAM) 50 MG tablet Take 1 tablet by mouth every 6 hours as needed for Pain for up to 30 days. 120 tablet 2    sotalol (BETAPACE) 80 MG tablet TAKE 1 TABLET BY MOUTH  TWICE DAILY 180 tablet 0    rivaroxaban (XARELTO) 15 MG TABS tablet TAKE 1 TABLET BY MOUTH  DAILY WITH BREAKFAST 90 tablet 0    levothyroxine (SYNTHROID) 75 MCG tablet TAKE 1 TABLET BY MOUTH  DAILY 90 tablet 0    gentamicin (GARAMYCIN) 0.1 % cream Apply topically daily. 1 Tube 0    furosemide (LASIX) 40 MG tablet TAKE 1 TABLET BY MOUTH  DAILY AS NEEDED FOR  SWELLING/ WEIGHT GAIN 90 tablet 1    losartan (COZAAR) 100 MG tablet TAKE ONE TABLET BY MOUTH ONCE NIGHTLY 90 tablet 4    omeprazole 20 MG EC tablet Take 20 mg by mouth daily      Multiple Vitamins-Minerals (THERAPEUTIC MULTIVITAMIN-MINERALS) tablet Take 1 tablet by mouth daily.  diphenhydrAMINE (BENADRYL) 25 MG tablet Take 25 mg by mouth nightly as needed.  spironolactone (ALDACTONE) 25 MG tablet TAKE 1 TABLET BY MOUTH  DAILY 90 tablet 3    hydrocortisone 2.5 % cream Apply topically 2 times daily.  56 g 1    diclofenac sodium (VOLTAREN) 1 % GEL Apply 4 grams to lower extremity joints and 2 grams to upper extremity joints as needed 4 times/day 500 g 2    tiZANidine (ZANAFLEX) 4 MG tablet 1 tab nightly and 1/2 tab during day prn muscle spasm/ tightness 45 tablet 1    traMADol (ULTRAM) 50 MG tablet Take 1-2 tablets by mouth every 6 hours as needed for Pain for up to 30 days. 240 tablet 2     Current Facility-Administered Medications   Medication Dose Route Frequency Provider Last Rate Last Admin    furosemide (LASIX) tablet 20 mg  20 mg Oral Daily Argelia Wolff MD   20 mg at 10/14/21 1956    [START ON 10/15/2021] levothyroxine (SYNTHROID) tablet 75 mcg  75 mcg Oral QAM AC Argelia Wolff MD       Kerrie Esteban Braxtonedwarddianne Murray ON 10/15/2021] rivaroxaban (XARELTO) tablet 15 mg  15 mg Oral Daily Argelia Wolff MD        sodium chloride flush 0.9 % injection 5-40 mL  5-40 mL IntraVENous 2 times per day Argelia Wolff MD   10 mL at 10/14/21 1956    sodium chloride flush 0.9 % injection 5-40 mL  5-40 mL IntraVENous PRN Argelia Wolff MD        0.9 % sodium chloride infusion  25 mL IntraVENous PRN Argelia Wolff MD        ondansetron (ZOFRAN-ODT) disintegrating tablet 4 mg  4 mg Oral Q8H PRN Argelia Wolff MD        Or    ondansetron (ZOFRAN) injection 4 mg  4 mg IntraVENous Q6H PRN Argelia Wolff MD        polyethylene glycol (GLYCOLAX) packet 17 g  17 g Oral Daily PRN Argelia Wolff MD        aspirin EC tablet 81 mg  81 mg Oral Daily Argelia Wolff MD   81 mg at 10/14/21 2001    Or    aspirin suppository 300 mg  300 mg Rectal Daily Argelia Wolff MD        rosuvastatin (CRESTOR) tablet 10 mg  10 mg Oral Nightly Argelia Wolff MD   10 mg at 10/14/21 1955    labetalol (NORMODYNE;TRANDATE) injection 10 mg  10 mg IntraVENous Q10 Min PRN Argelia Wolff MD        influenza quadrivalent split vaccine (FLUZONE;FLUARIX;FLULAVAL;AFLURIA) injection 0.5 mL  0.5 mL IntraMUSCular Prior to discharge Argelia Wolff MD         Allergies: Allergies   Allergen Reactions    Demerol     Dilaudid [Hydromorphone Hcl]     Hydrochlorothiazide     Macrobid [Nitrofurantoin Monohydrate Macrocrystals]     Other      FRESH FROZEN PLASMA- CAUSED SWELLING OF EYES AND MOUTH, HIVES!     Sulfa Antibiotics  Tetanus Toxoids         Social History:   reports that she quit smoking about 56 years ago. She has never used smokeless tobacco. She reports that she does not drink alcohol and does not use drugs. Family History:  family history is not on file. , family history reviewed not pertinent to current admission      Physical Exam:  BP (!) 161/80   Pulse 67   Temp 97.5 °F (36.4 °C) (Oral)   Resp 18   Ht 5' 5\" (1.651 m)   Wt 164 lb 3.9 oz (74.5 kg)   SpO2 97%   BMI 27.33 kg/m²     General appearance:  Appears comfortable. Well nourished  Eyes: Sclera clear, pupils equal  ENT: Moist mucus membranes, no thrush. Trachea midline. Cardiovascular: Regular rhythm, normal S1, S2. No murmur, gallop, rub. No edema in lower extremities  Respiratory: Clear to auscultation bilaterally, no wheeze, good inspiratory effort  Gastrointestinal: Abdomen soft, non-tender, not distended, normal bowel sounds  Musculoskeletal: No cyanosis in digits, neck supple  Neurology: Cranial nerves grossly intact. Alert and oriented in time, place and person. No speech or motor deficits  Psychiatry: Appropriate affect. Not agitated  Skin: Warm, dry, normal turgor, no rash    Labs:  CBC:   Lab Results   Component Value Date    WBC 10.6 10/14/2021    RBC 4.64 10/14/2021    RBC 4.92 04/21/2016    HGB 13.1 10/14/2021    HCT 40.3 10/14/2021    MCV 86.8 10/14/2021    MCH 28.2 10/14/2021    MCHC 32.5 10/14/2021    RDW 14.0 10/14/2021     10/14/2021    MPV 6.6 10/14/2021     BMP:    Lab Results   Component Value Date     10/14/2021    K 4.3 10/14/2021     10/14/2021    CO2 26 10/14/2021    BUN 26 10/14/2021    CREATININE 1.2 10/14/2021    CALCIUM 10.3 10/14/2021    GFRAA 51 10/14/2021    GFRAA >60 06/15/2013    LABGLOM 42 10/14/2021    GLUCOSE 120 10/14/2021       Chest Xray:   EKG:        Problem List  Active Problems:    TIA (transient ischemic attack)  Resolved Problems:    * No resolved hospital problems.  * Assessment/Plan:         1. TIA with facial weakness  Started on ASA and Statin  Cont on XArelto  Pacemaker is not MRI compatible  Neuro checks  Permissive HTN   Neurology has been consutled  SLP evaluation     Admit as inpatien. I anticipate hospitalization spanning more than two midnights for investigation and treatment of the above medically necessary diagnoses.       Cristy Jeans, MD    10/14/2021 9:34 PM

## 2021-10-15 NOTE — PROGRESS NOTES
Occupational Therapy   Occupational Therapy Initial Assessment and Discharge Summary       Date: 10/15/2021   Patient Name: Chase Rincon  MRN: [id number]     : 3/31/1927    Date of Service: 10/15/2021    Discharge Recommendations: Chase Rincon scored a 23/24 on the AM-PAC ADL Inpatient form. At this time, no further OT is recommended upon discharge due to baseline for ADLs. Symptoms fully resolved. Recommend patient returns to prior setting with prior services. OT Equipment Recommendations  Equipment Needed: No    Assessment   Assessment: Patient admitted with TIA. Had left eye droop. Patient's symptoms have resolved per patient report. No further OT indicated  Treatment Diagnosis: TIA  Prognosis: Good  Decision Making: Low Complexity  History: Indep with ADLs, lives with family, ambulates with RW  Exam: UE AROM WNL, baseline for mobility and ADLs  Assistance / Modification: RW, stable presentation  OT Education: OT Role  Patient Education: Patient verbalized understanding  Barriers to Learning: Hearing  No Skilled OT: Independent with functional mobility; Independent with ADL's;At baseline function; Safe to return home; No OT goals identified  REQUIRES OT FOLLOW UP: No  Activity Tolerance  Activity Tolerance: Patient Tolerated treatment well  Safety Devices  Safety Devices in place: Yes  Type of devices: All fall risk precautions in place;Nurse notified;Call light within reach; Chair alarm in place; Left in chair  Restraints  Initially in place: No           Patient Diagnosis(es): The encounter diagnosis was TIA (transient ischemic attack). has a past medical history of Atrial fibrillation ([address] Cancer ([address] Chronic ulcer of right great toe with fat layer exposed ([address] Hypertension, Hypothyroidism, Leg DVT (deep venous thromboembolism), acute ([address] Osteoarthritis, PE (pulmonary embolism), and Scoliosis. has a past surgical history that includes Hysterectomy;  Pacemaker insertion; ERCP (6/17/2013); Cholecystectomy, laparoscopic (4,16,9401); and Upper gastrointestinal endoscopy (10/21/2016). Treatment Diagnosis: TIA      Restrictions  Restrictions/Precautions  Restrictions/Precautions: Fall Risk (high fall risk)  Implants present? : Pacemaker  Position Activity Restriction  Other position/activity restrictions: Montserrat Macdonald is a 80 y.o. female with h/o HTN , Afib , Chronic anticoagulation , CHF, cardiac pacemaker in place presented with c/o left sided facial weakness and change in vision . Symptoms started earlier today when she was at the beauty salon. Symptoms resolved by the time pt arrived to the ED. Denies h/o stroke. No other focal neurological deficit. Stroke alert was called. CT head was neg for hemorrhage . Showed chronic ischemic changes . CTA head and neck did not show any flow limiting stenosis    Subjective   General  Chart Reviewed: Yes  Patient assessed for rehabilitation services?: Yes  Family / Caregiver Present: No  Diagnosis: TIA  Subjective  Subjective: Patient supine in bed, pleasant and cooperative. Very hard of hearing (hearing aides charging).  Reports is back to baseline and left eye droop is resolved (looked in the mirror)  Patient Currently in Pain: Denies  Vital Signs  Patient Currently in Pain: Denies  Height and Weight  Weight: 163 lb 2.3 oz (74 kg)  Weight Method: Bed scale  BMI (Calculated): 27.2  Social/Functional History  Social/Functional History  Lives With: Family (son and daughter in law)  Home Access: Stairs to enter with rails  Entrance Stairs - Number of Steps: 2 ALYSIA  Entrance Stairs - Rails: Right  Bathroom Shower/Tub: Walk-in shower  Bathroom Toilet: Handicap height  Bathroom Equipment: Grab bars in shower  Bathroom Accessibility: Accessible  Home Equipment: 4 wheeled walker  Receives Help From: Family  ADL Assistance: [address]: Needs assistance (son and daughter in law take care of house)  Ambulation Assistance: Independent  Transfer Assistance: Independent  Active : No  Patient's  Info: Family takes pt to MD appointments  Type of occupation: retired pharmacist  Additional Comments: no falls in past 6 months       Objective   Hearing Exceptions: Hard of hearing/hearing concerns;Bilateral hearing aid    Orientation  Overall Orientation Status: Within Normal Limits  Observation/Palpation  Posture: Poor (scoliosis, significant foward flexed posture)  Observation: Very kyphotic back, flexed posture  Balance  Sitting Balance: Independent  Standing Balance: Supervision  Standing Balance  Time: @ 5 minutes  Activity: !0 feet with RW into bathroom commode, 50 feet with RW in room and hallway  Comment: Patient supervision with RW, very flexed posture, scoliosis and arthritis. Functional Mobility  Functional - Mobility Device: Rolling Walker  Activity: To/from bathroom  Assist Level: Supervision  Toilet Transfers  Equipment Used: Standard toilet  Toilet Transfer: Minimal assistance  Toilet Transfers Comments: Patient needed minimal assist from commode due to lower than her toilet at home  ADL  Feeding: Setup  Grooming: Setup  LE Dressing: Independent;Setup (indep donning sneakers and tying laces.)  Toileting: Independent  Tone RUE  RUE Tone: Normotonic  Tone LUE  LUE Tone: Normotonic  Coordination  Movements Are Fluid And Coordinated: Yes           Vision - Basic Assessment  Prior Vision: Wears glasses only for reading  Vision Comments: Patient stated no changes in vision. Reports left eye droop has resolved (looked at eyes in the mirror in the bathroom).   Cognition  Overall Cognitive Status: WNL  Perception  Overall Perceptual Status: WFL     Sensation  Overall Sensation Status: WNL        LUE AROM (degrees)  LUE AROM : WFL  Left Hand AROM (degrees)  Left Hand AROM: WFL  RUE AROM (degrees)  RUE AROM : WFL  Right Hand AROM (degrees)  Right Hand AROM: WFL  LUE Strength  Gross LUE Strength: WFL  RUE Strength  Gross RUE

## 2021-10-15 NOTE — PROGRESS NOTES
Patient was ordered for an MRI scan. Researched model number and talked to Nurse/patient and concluded that pacemaker is not mri conditional. Nurse to inform provider and order was cancelled.

## 2021-10-15 NOTE — PROGRESS NOTES
Facility/Department: [address]  Initial Assessment  DYSPHAGIA BEDSIDE SWALLOW EVALUATION     Patient: Paticia Skiff   : 3/31/1927   MRN: [id number]      Evaluation Date: 10/15/2021   Admitting Diagnosis: TIA (transient ischemic attack) [G45.9]  Pain: denies                         H&P: \"Elena Helton is a 80 y.o. female with h/o HTN , Afib , Chronic anticoagulation , CHF, cardiac pacemaker in place presented with c/o left sided facial weakness and change in vision . Symptoms started earlier today when she was at the beauty salon. Symptoms resolved by the time pt arrived to the ED. Denies h/o stroke. No other focal neurological deficit. Stroke alert was called. CT head was neg for hemorrhage . Showed chronic ischemic changes . CTA head and neck did not show any flow limiting stenosis\"    Recent Chest xray:   Impression   Right perihilar and left basilar atelectasis     Recent Head CT:   Impression   No acute intracranial abnormality.       Moderate cerebral atrophy and moderate a large chronic ischemic changes both   appropriate for age.  No significant change from the prior study. History/Prior Level of Function:   Living Status: Home with family   Prior Dysphagia History: Pt evaluated 2020 by SLP at outside hospital. Pt was assessed to have mild oropharyngeal dysphagia with recommendations for Regular texture diet with thin liquids. Pt denies any difficulty with swallowing and feels as if she is at her baseline. Dysphagia Impressions/Diagnosis: Oropharyngeal Dysphagia   Pt was positioned upright in chair, awake and alert. Pt passed 3 oz water swallow screen with RN. Consuming breakfast upon SLP entrance into room. Oral mechanism examination; no focal weakness or facial droop noted, adequate ROM/coordination. Trials of thin liquids and regular solids were provided to assess swallow function. Pt demonstrated adequate but prolonged mastication.  Pharyngeal pooling was suspected with delayed swallow initiation. Intermittent throat clearing was noted between swallows which may be indicative of laryngeal penetration vs pharyngeal stasis however, pt with stable vocal quality and stable respiratory status throughout. It appears pt is at baseline for swallow function at this time. Recommended Diet and Intervention 10/15/2021:  Diet Solids Recommendation:  Regular texture diet  Liquid Consistency Recommendation:  Thins  Recommended form of Meds: as tolerated      Compensatory Swallowing Strategies: Alternate solids/liquids , Upright as possible with all PO intake , Small bites/sips , Eat/feed slowly, Remain upright 30-45 min     Discharge Recommendations: No further follow-up appears indicated at this time.      Patient Positioning: Upright in bed    Current Diet Level (prior to evaluation): Regular texture diet with thin liquids     Respiratory Status:   [x]Room Air   []O2 via nasal cannula   []Other:    Dentition:  [x]Adequate  []Dentures   []Missing Many Teeth  []Edentulous  []Other:    Baseline Vocal Quality:  [x]Normal  []Dysphonic   []Aphonic   []Hoarse  []Wet  []Weak  []Other:    Volitional Swallow:   []Absent   []Delayed     [x]Adequate     []Required use of drink     Oral Mechanism Exam:  [x]WFL []Mild   [] Moderate  []Severe  []To be assessed  Impaired:   []Left side      []Right side    []Labial ROM/Coordination    []Labial Symmetry   []Lingual ROM/Coordination   []Lingual Symmetry  []Gag  []Other:     Oral Phase: []WFL [x]Mild   [] Moderate  []Severe  []To be assessed   [x]Impaired/Prolonged Mastication:   []Spillage Left:   []Spillage Right:  []Pocketing Left:   []Pocketing Right:   []Decreased Anterior to Posterior Transit:   []Suspected Premature Bolus Loss:   []Lingual/Palatal Residue:   []Other:     Pharyngeal Phase: []WFL [x]Mild   [] Moderate  []Severe  []To be assessed   [x]Delayed Swallow:   [x]Suspected Pharyngeal Pooling:   [x]Decreased Laryngeal Elevation:   []Absent Swallow:  []Wet Vocal Quality:   []Throat Clearing-Immediate:   []Throat Clearing-Delayed:   []Cough-Immediate:   []Cough-Delayed:  []Change in Vital Signs:  []Suspected Delayed Pharyngeal Clearing:  [x]Other:  Intermittent throat clearing       Eating Assistance:  [x]Independent  []Setup or clean-up assistance   [] Supervision or touching assistance   [] Partial or moderate assistance   [] Substantial or maximal assistance  [] Dependent       EDUCATION:   Provided education regarding role of SLP, results of assessment, recommendations and general speech pathology plan of care. [x] Pt verbalized understanding and agreement   [] Pt requires ongoing learning   [] No evidence of comprehension     If patient discharges prior to next visit, this note will serve as discharge.      Timed Code Minutes: 0 minutes   Total Treatment Minutes: 25 minutes    Electronically signed by:    Lance Mann MA [facility]  Speech Language Pathologist

## 2021-10-15 NOTE — CONSULTS
In patient Neurology consult        [facility] Neurology      MD Vickie Castellanos  3/31/1927    Date of Service: 10/15/2021    Referring Physician: Rush Bishop MD      Reason for the consult and CC: acute facial droop    HPI:   The patient is a 80y.o.  years old female with history of hypertension, A. fib on is allergic to and other medical issue was admitted to the hospital yesterday with left facial droop and visual changes. Onset was yesterday morning. Degree was moderate. Duration was hours. Description was left facial asymmetry with mild visual disturbance but no headache. No weakness numbness or chest pain. No other relieving or aggravating factors or falling. No clear triggers. She came to the ED for evaluation peer initial imaging was unremarkable. She was admitted to the hospital.  Today she feels back to her baseline. No residual deficit. No headache, visual change or weakness or facial droop. Blood test reviewed today. She was able to walk with her walker. She is currently on aspirin and Xarelto. Other review of system was unremarkable.     Family history: Noncontributory    Past Surgical History:   Procedure Laterality Date   Karen Vang  0,73,2428    with cholangiogram    ERCP  2013    with Sphinchterotomy/Stone Removal    HYSTERECTOMY      PACEMAKER INSERTION      UPPER GASTROINTESTINAL ENDOSCOPY  10/21/2016    esophageal tear        Past Medical History:   Diagnosis Date    Atrial fibrillation ([address]     Cancer (HCC)     gallbladder    Chronic ulcer of right great toe with fat layer exposed ([address] 2021    Hypertension     Hypothyroidism     Leg DVT (deep venous thromboembolism), acute (HCC)     Osteoarthritis     PE (pulmonary embolism)     Scoliosis      Social History     Tobacco Use    Smoking status: Former Smoker     Quit date: 6/15/1965     Years since quittin.3    Smokeless tobacco: Never Used   Substance Use Topics    Alcohol use: No    Drug use: No     Allergies   Allergen Reactions    Demerol     Dilaudid [Hydromorphone Hcl]     Hydrochlorothiazide     Macrobid [Nitrofurantoin Monohydrate Macrocrystals]     Other      FRESH FROZEN PLASMA- CAUSED SWELLING OF EYES AND MOUTH, HIVES!     Sulfa Antibiotics     Tetanus Toxoids      Current Facility-Administered Medications   Medication Dose Route Frequency Provider Last Rate Last Admin    perflutren lipid microspheres (DEFINITY) injection 1.65 mg  1.5 mL IntraVENous ONCE PRN Alfonso Leal MD        furosemide (LASIX) tablet 20 mg  20 mg Oral Daily Piedad Wellington MD   20 mg at 10/15/21 0938    levothyroxine (SYNTHROID) tablet 75 mcg  75 mcg Oral QAM AC Piedad Wellington MD   75 mcg at 10/15/21 0538    rivaroxaban (XARELTO) tablet 15 mg  15 mg Oral Daily Piedad Wellington MD        sodium chloride flush 0.9 % injection 5-40 mL  5-40 mL IntraVENous 2 times per day Piedad Wellington MD   10 mL at 10/15/21 0939    sodium chloride flush 0.9 % injection 5-40 mL  5-40 mL IntraVENous PRN Piedad Wellington MD        0.9 % sodium chloride infusion  25 mL IntraVENous PRN Piedad Wellington MD        ondansetron (ZOFRAN-ODT) disintegrating tablet 4 mg  4 mg Oral Q8H PRN Piedad Wellington MD        Or    ondansetron (ZOFRAN) injection 4 mg  4 mg IntraVENous Q6H PRN Piedad Wellington MD        polyethylene glycol (GLYCOLAX) packet 17 g  17 g Oral Daily PRN Piedad Wellington MD        aspirin EC tablet 81 mg  81 mg Oral Daily Piedad Wellington MD   81 mg at 10/15/21 2687    Or    aspirin suppository 300 mg  300 mg Rectal Daily Piedad Wellington MD        rosuvastatin (CRESTOR) tablet 10 mg  10 mg Oral Nightly Piedad Wellington MD   10 mg at 10/14/21 1955    labetalol (NORMODYNE;TRANDATE) injection 10 mg  10 mg IntraVENous Q10 Min PRN Piedad Wellington MD        influenza quadrivalent split vaccine (FLUZONE;FLUARIX;FLULAVAL;AFLURIA) injection 0.5 mL  0.5 mL IntraMUSCular Prior to discharge Piedad Wellington MD ROS : A 10-14 system review of constitutional, cardiovascular, respiratory, eyes, musculoskeletal, endocrine, GI, ENT, skin, hematological, genitourinary, psychiatric and neurologic systems was obtained and updated today and is unremarkable except as mentioned in my HPI      Exam:     Constitutional:   Vitals:    10/15/21 0537 10/15/21 0755 10/15/21 0930 10/15/21 1330   BP: 126/64  113/72 127/79   Pulse: 63  74 78   Resp: 18  18 16   Temp: 97.2 °F (36.2 °C)  97.8 °F (36.6 °C) 98.1 °F (36.7 °C)   TempSrc: Oral  Oral Oral   SpO2: 95%  96% 97%   Weight:  163 lb 2.3 oz (74 kg)     Height:           General appearance:  Normal development and appear in no acute distress. Eye:  Fundus of the eye: Funduscopic examination cannot be performed due to COVID19 restrictions and precautions. Neck: Kyphosis  Cardiovascular: No lower leg edema with good pulsation. Mental Status:   Oriented to person, place, problem, and time. Memory: Good immediate recall. Intact remote memory  Normal attention span and concentration. Language: intact naming, repeating and fluency   Good fund of Knowledge. Aware of current events and vocabulary   Cranial Nerves:   II: Visual fields: Full. Pupils: equal, round, reactive to light  III,IV,VI: Extra Ocular Movements are intact. No nystagmus  V: Facial sensation is intact   VII: Facial strength and movements: intact and symmetric  VIII: Hearing: Intact  IX: Palate elevation is symmetric  XI: Shoulder shrug is intact  XII: Tongue movements are normal  Musculoskeletal: 5/5 in all 4 extremities. Tone: Normal tone. Reflexes: 1+ throughout  Planters: flexor bilaterally. Coordination: No tremors or dysmetria  Sensation: normal to all modalities in both arms and legs.   Gait/Posture: steady gait with her walker    Data:  LABS:   Lab Results   Component Value Date     10/14/2021    K 4.3 10/14/2021     10/14/2021    CO2 26 10/14/2021    BUN 26 10/14/2021    CREATININE 1.2 10/14/2021    GFRAA 51 10/14/2021    GFRAA >60 06/15/2013    LABGLOM 42 10/14/2021    GLUCOSE 120 10/14/2021    PHOS 3.5 04/14/2021    MG 2.30 04/14/2021    CALCIUM 10.3 10/14/2021     Lab Results   Component Value Date    WBC 7.7 10/15/2021    RBC 4.14 10/15/2021    RBC 4.92 04/21/2016    HGB 11.6 10/15/2021    HCT 35.1 10/15/2021    MCV 84.8 10/15/2021    RDW 13.7 10/15/2021     10/15/2021     Lab Results   Component Value Date    INR 1.3 09/06/2018    PROTIME 15.7 09/06/2018       Neuroimaging were independently reviewed by me and discussed results with the patient  Reviewed notes from different physicians  Reviewed lab and blood testing    Impression:  Acute left facial droop seems to be improving. Likely TIA. A. fib  Chronic anticoagulation  Hypertension      Recommendation:  Continue Xarelto  Discussed risk bleeding with dual therapy  Statin  PT and OT  Continue blood pressure medications  Telemetry  PPI  Blood sugar monitor at home  Stroke education, secondary prevention and risk of falling were discussed  Can be discharged from neurology once medically stable. Thank you for referring such patient. If you have any questions regarding my consult note, please don't hesitate to call me. Julio Michel MD  [phone]    This dictation was generated by voice recognition computer software.  Although all attempts are made to edit the dictation for accuracy, there may be errors in the  transcription that are not intended

## 2021-10-16 LAB
ESTIMATED AVERAGE GLUCOSE: 116.9 MG/DL
HBA1C MFR BLD: 5.7 %

## 2021-11-04 RX ORDER — LOSARTAN POTASSIUM 100 MG/1
TABLET ORAL
Qty: 90 TABLET | Refills: 0 | Status: SHIPPED | OUTPATIENT
Start: 2021-11-04 | End: 2022-06-01 | Stop reason: SDUPTHER

## 2021-11-07 ENCOUNTER — PATIENT MESSAGE (OUTPATIENT)
Dept: FAMILY MEDICINE CLINIC | Age: 86
End: 2021-11-07

## 2021-11-12 ENCOUNTER — IMMUNIZATION (OUTPATIENT)
Dept: FAMILY MEDICINE CLINIC | Age: 86
End: 2021-11-12
Payer: MEDICARE

## 2021-11-12 DIAGNOSIS — Z23 NEED FOR IMMUNIZATION AGAINST INFLUENZA: Primary | ICD-10-CM

## 2021-11-12 PROCEDURE — G0008 ADMIN INFLUENZA VIRUS VAC: HCPCS | Performed by: FAMILY MEDICINE

## 2021-11-12 PROCEDURE — 90694 VACC AIIV4 NO PRSRV 0.5ML IM: CPT | Performed by: FAMILY MEDICINE

## 2021-11-12 NOTE — PROGRESS NOTES
Vaccine Information Sheet, \"Influenza - Inactivated\"  given to Yandy Power, or parent/legal guardian of  Yandy Power and verbalized understanding. Patient responses:    Have you ever had a reaction to a flu vaccine? No  Do you have any current illness? No  Have you ever had [place] Syndrome? No  Do you have a serious allergy to any of the follow: Neomycin, Polymyxin, Thimerosal, eggs or egg products? No    Flu vaccine given per order. Please see immunization tab. Risks and benefits explained. Current VIS given.       Immunizations Administered     Name Date Dose Route    Influenza, Quadv, adjuvanted, 65 yrs +, IM, PF (Fluad) 11/12/2021 0.5 mL Intramuscular    Site: Deltoid- Right    Lot: [id number]    NDC: 46786-724-88

## 2021-11-15 RX ORDER — FUROSEMIDE 40 MG/1
40 TABLET ORAL DAILY
Qty: 90 TABLET | Refills: 1 | Status: SHIPPED | OUTPATIENT
Start: 2021-11-15 | End: 2021-11-16 | Stop reason: SDUPTHER

## 2021-11-16 ENCOUNTER — TELEPHONE (OUTPATIENT)
Dept: FAMILY MEDICINE CLINIC | Age: 86
End: 2021-11-16

## 2021-11-16 RX ORDER — FUROSEMIDE 40 MG/1
40 TABLET ORAL SEE ADMIN INSTRUCTIONS
Qty: 90 TABLET | Refills: 1 | Status: SHIPPED | OUTPATIENT
Start: 2021-11-16

## 2021-12-03 RX ORDER — LEVOTHYROXINE SODIUM 0.07 MG/1
TABLET ORAL
Qty: 90 TABLET | Refills: 0 | Status: SHIPPED | OUTPATIENT
Start: 2021-12-03 | End: 2022-02-07 | Stop reason: SDUPTHER

## 2021-12-03 NOTE — TELEPHONE ENCOUNTER
LV 9/23/21 AWV WITH DR MUNIZ     Return in 3 months (on 12/23/2021) for Medicare Annual Wellness Visit in 1 year.

## 2021-12-13 RX ORDER — SOTALOL HYDROCHLORIDE 80 MG/1
TABLET ORAL
Qty: 180 TABLET | Refills: 1 | Status: SHIPPED | OUTPATIENT
Start: 2021-12-13 | End: 2022-07-05

## 2021-12-29 ENCOUNTER — TELEPHONE (OUTPATIENT)
Dept: FAMILY MEDICINE CLINIC | Age: 86
End: 2021-12-29

## 2021-12-29 RX ORDER — DOXYCYCLINE HYCLATE 100 MG
100 TABLET ORAL 2 TIMES DAILY
Qty: 14 TABLET | Refills: 0 | Status: SHIPPED | OUTPATIENT
Start: 2021-12-29 | End: 2022-09-06 | Stop reason: SDUPTHER

## 2021-12-29 NOTE — TELEPHONE ENCOUNTER
Patient is not feeling well -symptoms - cough-no color, low grade fever, no headache, feeling kind of out of it, fatigue, she has not lost her taste or smell, sore throat - started before Marco A, she has not had a covid test. Please give her a call back.  Patient is refusing to get a covid test. She does not feel up to getting the test.     [address] [place] - PHONE [phone]

## 2022-02-07 ENCOUNTER — TELEPHONE (OUTPATIENT)
Dept: FAMILY MEDICINE CLINIC | Age: 87
End: 2022-02-07

## 2022-02-07 RX ORDER — LEVOTHYROXINE SODIUM 0.07 MG/1
TABLET ORAL
Qty: 14 TABLET | Refills: 0 | Status: SHIPPED | OUTPATIENT
Start: 2022-02-07 | End: 2022-05-03

## 2022-02-07 NOTE — TELEPHONE ENCOUNTER
Pt is out of her medication. She is waiting on her mail away. levothyroxine 75 mcg #14     Can we send this in to a local pharmacy?     [facility] [phone]

## 2022-05-03 RX ORDER — LEVOTHYROXINE SODIUM 0.07 MG/1
TABLET ORAL
Qty: 90 TABLET | Refills: 0 | Status: SHIPPED | OUTPATIENT
Start: 2022-05-03 | End: 2022-07-05

## 2022-05-20 DIAGNOSIS — M19.90 ARTHRITIS: ICD-10-CM

## 2022-05-20 RX ORDER — TRAMADOL HYDROCHLORIDE 50 MG/1
TABLET ORAL
Qty: 120 TABLET | Refills: 0 | Status: SHIPPED | OUTPATIENT
Start: 2022-05-20 | End: 2022-09-21 | Stop reason: SDUPTHER

## 2022-06-01 RX ORDER — LOSARTAN POTASSIUM 100 MG/1
100 TABLET ORAL DAILY
Qty: 90 TABLET | Refills: 0 | Status: SHIPPED | OUTPATIENT
Start: 2022-06-01 | End: 2022-09-21 | Stop reason: SDUPTHER

## 2022-07-05 RX ORDER — LEVOTHYROXINE SODIUM 0.07 MG/1
TABLET ORAL
Qty: 90 TABLET | Refills: 0 | Status: SHIPPED | OUTPATIENT
Start: 2022-07-05 | End: 2022-08-08 | Stop reason: SDUPTHER

## 2022-07-05 RX ORDER — SOTALOL HYDROCHLORIDE 80 MG/1
TABLET ORAL
Qty: 180 TABLET | Refills: 1 | Status: SHIPPED | OUTPATIENT
Start: 2022-07-05

## 2022-08-04 ENCOUNTER — OFFICE VISIT (OUTPATIENT)
Dept: FAMILY MEDICINE CLINIC | Age: 87
End: 2022-08-04
Payer: MEDICARE

## 2022-08-04 VITALS
OXYGEN SATURATION: 98 % | HEIGHT: 65 IN | SYSTOLIC BLOOD PRESSURE: 118 MMHG | WEIGHT: 158 LBS | DIASTOLIC BLOOD PRESSURE: 64 MMHG | BODY MASS INDEX: 26.33 KG/M2 | HEART RATE: 64 BPM

## 2022-08-04 DIAGNOSIS — N18.30 STAGE 3 CHRONIC KIDNEY DISEASE, UNSPECIFIED WHETHER STAGE 3A OR 3B CKD (HCC): ICD-10-CM

## 2022-08-04 DIAGNOSIS — Z85.09 HISTORY OF CANCER OF GALLBLADDER: ICD-10-CM

## 2022-08-04 DIAGNOSIS — I48.91 ATRIAL FIBRILLATION, UNSPECIFIED TYPE (HCC): ICD-10-CM

## 2022-08-04 DIAGNOSIS — D64.9 MILD ANEMIA: ICD-10-CM

## 2022-08-04 DIAGNOSIS — E55.9 VITAMIN D DEFICIENCY: ICD-10-CM

## 2022-08-04 DIAGNOSIS — E03.9 ACQUIRED HYPOTHYROIDISM: ICD-10-CM

## 2022-08-04 DIAGNOSIS — I50.42 CHRONIC COMBINED SYSTOLIC AND DIASTOLIC CONGESTIVE HEART FAILURE (HCC): ICD-10-CM

## 2022-08-04 DIAGNOSIS — E78.00 HYPERCHOLESTEREMIA: ICD-10-CM

## 2022-08-04 DIAGNOSIS — R41.3 MEMORY CHANGE: Primary | ICD-10-CM

## 2022-08-04 LAB
A/G RATIO: 1.6 (ref 1.1–2.2)
ALBUMIN SERPL-MCNC: 4.3 G/DL (ref 3.4–5)
ALP BLD-CCNC: 103 U/L (ref 40–129)
ALT SERPL-CCNC: 15 U/L (ref 10–40)
ANION GAP SERPL CALCULATED.3IONS-SCNC: 12 MMOL/L (ref 3–16)
AST SERPL-CCNC: 26 U/L (ref 15–37)
BASOPHILS ABSOLUTE: 0 K/UL (ref 0–0.2)
BASOPHILS RELATIVE PERCENT: 0.5 %
BILIRUB SERPL-MCNC: 0.6 MG/DL (ref 0–1)
BUN BLDV-MCNC: 21 MG/DL (ref 7–20)
CALCIUM SERPL-MCNC: 10.7 MG/DL (ref 8.3–10.6)
CHLORIDE BLD-SCNC: 97 MMOL/L (ref 99–110)
CO2: 30 MMOL/L (ref 21–32)
CREAT SERPL-MCNC: 1.2 MG/DL (ref 0.6–1.2)
EOSINOPHILS ABSOLUTE: 0.1 K/UL (ref 0–0.6)
EOSINOPHILS RELATIVE PERCENT: 1 %
GFR AFRICAN AMERICAN: 50
GFR NON-AFRICAN AMERICAN: 42
GLUCOSE BLD-MCNC: 112 MG/DL (ref 70–99)
HCT VFR BLD CALC: 42.3 % (ref 36–48)
HEMOGLOBIN: 14.3 G/DL (ref 12–16)
LYMPHOCYTES ABSOLUTE: 1.6 K/UL (ref 1–5.1)
LYMPHOCYTES RELATIVE PERCENT: 20.2 %
MCH RBC QN AUTO: 29.7 PG (ref 26–34)
MCHC RBC AUTO-ENTMCNC: 33.8 G/DL (ref 31–36)
MCV RBC AUTO: 87.8 FL (ref 80–100)
MONOCYTES ABSOLUTE: 0.8 K/UL (ref 0–1.3)
MONOCYTES RELATIVE PERCENT: 10.9 %
NEUTROPHILS ABSOLUTE: 5.2 K/UL (ref 1.7–7.7)
NEUTROPHILS RELATIVE PERCENT: 67.4 %
PDW BLD-RTO: 15.8 % (ref 12.4–15.4)
PLATELET # BLD: 249 K/UL (ref 135–450)
PMV BLD AUTO: 7.3 FL (ref 5–10.5)
POTASSIUM SERPL-SCNC: 4.9 MMOL/L (ref 3.5–5.1)
RBC # BLD: 4.82 M/UL (ref 4–5.2)
SODIUM BLD-SCNC: 139 MMOL/L (ref 136–145)
T4 FREE: 1.6 NG/DL (ref 0.9–1.8)
TOTAL PROTEIN: 7 G/DL (ref 6.4–8.2)
TSH SERPL DL<=0.05 MIU/L-ACNC: 0.66 UIU/ML (ref 0.27–4.2)
VITAMIN B-12: 1176 PG/ML (ref 211–911)
VITAMIN D 25-HYDROXY: 53.7 NG/ML
WBC # BLD: 7.7 K/UL (ref 4–11)

## 2022-08-04 PROCEDURE — 99214 OFFICE O/P EST MOD 30 MIN: CPT | Performed by: FAMILY MEDICINE

## 2022-08-04 PROCEDURE — G8427 DOCREV CUR MEDS BY ELIG CLIN: HCPCS | Performed by: FAMILY MEDICINE

## 2022-08-04 PROCEDURE — 1123F ACP DISCUSS/DSCN MKR DOCD: CPT | Performed by: FAMILY MEDICINE

## 2022-08-04 PROCEDURE — 36415 COLL VENOUS BLD VENIPUNCTURE: CPT | Performed by: FAMILY MEDICINE

## 2022-08-04 PROCEDURE — 1090F PRES/ABSN URINE INCON ASSESS: CPT | Performed by: FAMILY MEDICINE

## 2022-08-04 PROCEDURE — G8417 CALC BMI ABV UP PARAM F/U: HCPCS | Performed by: FAMILY MEDICINE

## 2022-08-04 PROCEDURE — 1036F TOBACCO NON-USER: CPT | Performed by: FAMILY MEDICINE

## 2022-08-04 PROCEDURE — 81002 URINALYSIS NONAUTO W/O SCOPE: CPT | Performed by: FAMILY MEDICINE

## 2022-08-04 RX ORDER — IPRATROPIUM BROMIDE 42 UG/1
1-2 SPRAY, METERED NASAL EVERY 6 HOURS PRN
Qty: 15 ML | Refills: 3 | Status: SHIPPED | OUTPATIENT
Start: 2022-08-04

## 2022-08-04 SDOH — ECONOMIC STABILITY: FOOD INSECURITY: WITHIN THE PAST 12 MONTHS, YOU WORRIED THAT YOUR FOOD WOULD RUN OUT BEFORE YOU GOT MONEY TO BUY MORE.: NEVER TRUE

## 2022-08-04 SDOH — ECONOMIC STABILITY: FOOD INSECURITY: WITHIN THE PAST 12 MONTHS, THE FOOD YOU BOUGHT JUST DIDN'T LAST AND YOU DIDN'T HAVE MONEY TO GET MORE.: NEVER TRUE

## 2022-08-04 SDOH — ECONOMIC STABILITY: TRANSPORTATION INSECURITY
IN THE PAST 12 MONTHS, HAS LACK OF TRANSPORTATION KEPT YOU FROM MEETINGS, WORK, OR FROM GETTING THINGS NEEDED FOR DAILY LIVING?: NO

## 2022-08-04 SDOH — ECONOMIC STABILITY: TRANSPORTATION INSECURITY
IN THE PAST 12 MONTHS, HAS THE LACK OF TRANSPORTATION KEPT YOU FROM MEDICAL APPOINTMENTS OR FROM GETTING MEDICATIONS?: NO

## 2022-08-04 ASSESSMENT — PATIENT HEALTH QUESTIONNAIRE - PHQ9
SUM OF ALL RESPONSES TO PHQ QUESTIONS 1-9: 0
2. FEELING DOWN, DEPRESSED OR HOPELESS: 0
1. LITTLE INTEREST OR PLEASURE IN DOING THINGS: 0
SUM OF ALL RESPONSES TO PHQ QUESTIONS 1-9: 0
SUM OF ALL RESPONSES TO PHQ9 QUESTIONS 1 & 2: 0

## 2022-08-04 ASSESSMENT — SOCIAL DETERMINANTS OF HEALTH (SDOH): HOW HARD IS IT FOR YOU TO PAY FOR THE VERY BASICS LIKE FOOD, HOUSING, MEDICAL CARE, AND HEATING?: NOT HARD AT ALL

## 2022-08-04 NOTE — PROGRESS NOTES
[facility]  Clinic Note    Date: 8/4/2022                                               Subjective:     Chief Complaint   Patient presents with    Other     COGNITIVE ABILITY/SLUMS NEEDED - PT GETS CONFUSED AND FORGETFUL      Other    Not seen for some time - had virtual visit 9/21  Labs 10/21 reviewed - hgb 11.6 w/ gfr 42 (26/1.2) o/w good  Moderate cerebral atrophy and ischemic changes on ct 10/21  REMOTE HX OF GALLBLADDER SURGERY - HAD CHOLECYSTECTOMY  WEAK/ NO ENERGY - FORGETFUL AT TIMES. Interested in uti check. No urinary sxs. Slow progression over a period of time  Some days fuzzy and out of it then next day seems sharp. On vitamin d 1000/d and mvi  Good diet generally  Uses walker to get around feels steady - needs walker to get around  Some nasal drainage - no tx for this  Gets more soboe early in day - better later in day  Clearing throat  - gets mucus hung up in throat. Hydrated well - no congestion  No cp/palpitations  No swelling.   Sleeps well  No mood concerns       Patient Active Problem List    Diagnosis Date Noted    TIA (transient ischemic attack) 10/14/2021    Chronic combined systolic and diastolic congestive heart failure ([address] 06/23/2021    Chronic ulcer of great toe of right foot, limited to breakdown of skin ([address]     Chronic ulcer of right great toe with fat layer exposed ([address] 02/18/2021    Pneumonia 01/12/2020    Generalized weakness 11/10/2019    CKD (chronic kidney disease), stage III ([address] 11/19/2018    HTN (hypertension), benign 11/19/2018    Environmental and seasonal allergies 05/29/2018    Puckering of left macula 01/04/2018    Drusen of right macula 01/04/2018    Dry eye syndrome 01/04/2018    Keratoconjunctivitis sicca of both eyes not specified as Sjogren's 01/04/2018    Arthritis 09/20/2017    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    Sleep disorder 27/10/2985    Diastolic dysfunction, left ventricle 10/28/2014    DJD (degenerative joint disease) of cervical spine 05/27/2014    DJD (degenerative joint disease) of thoracic spine 05/27/2014    DJD (degenerative joint disease), lumbosacral 05/27/2014    Osteoarthritis of lumbar spine 05/27/2014    History of cancer of gallbladder 09/04/2013    Choledocholithiasis with obstruction 06/15/2013    Cardiac pacemaker in situ 03/26/2013    CHB (complete heart block) ([address] 03/26/2013    SSS (sick sinus syndrome) ([address] 03/26/2013    Hypothyroidism 09/18/2012    Hypercholesteremia 09/18/2012    Atrial fibrillation, chronic ([address] 05/05/2011    Anticoagulated 05/05/2011    Venous (peripheral) insufficiency 10/04/2010    Encounter for long-term (current) use of other medications 06/02/2010    Allergic rhinitis 12/27/2008    Female stress incontinence 12/27/2008    Iron deficiency anemia 12/27/2008    Phlebitis and thrombophlebitis of other deep vessels of lower extremities 12/27/2008    Pernicious anemia 12/27/2008     Past Medical History:   Diagnosis Date    Atrial fibrillation ([address]     Cancer ([address]     gallbladder    Chronic ulcer of right great toe with fat layer exposed ([address] 2/18/2021    Hypertension     Hypothyroidism     Leg DVT (deep venous thromboembolism), acute (HCC)     Osteoarthritis     PE (pulmonary embolism)     Scoliosis      Past Surgical History:   Procedure Laterality Date    CHOLECYSTECTOMY, LAPAROSCOPIC  9,47,0267    with cholangiogram    ERCP  6/17/2013    with Sphinchterotomy/Stone Removal    HYSTERECTOMY      PACEMAKER INSERTION      UPPER GASTROINTESTINAL ENDOSCOPY  10/21/2016    esophageal tear     Admission on 10/14/2021, Discharged on 10/15/2021   Component Date Value Ref Range Status    Ventricular Rate 10/14/2021 66  BPM Final    Atrial Rate 10/14/2021 64  BPM Final    QRS Duration 10/14/2021 154  ms Final    Q-T Interval 10/14/2021 484  ms Final    QTc Calculation (Bazett) 10/14/2021 507  ms Final    R Axis 10/14/2021 267  degrees Final    T Axis 10/14/2021 59  degrees Final    Diagnosis 10/14/2021 AV 10/14/2021 10.3  8.3 - 10.6 mg/dL Final    Total Protein 10/14/2021 7.8  6.4 - 8.2 g/dL Final    Albumin 10/14/2021 4.3  3.4 - 5.0 g/dL Final    Albumin/Globulin Ratio 10/14/2021 1.2  1.1 - 2.2 Final    Total Bilirubin 10/14/2021 0.6  0.0 - 1.0 mg/dL Final    Alkaline Phosphatase 10/14/2021 102  40 - 129 U/L Final    ALT 10/14/2021 13  10 - 40 U/L Final    AST 10/14/2021 27  15 - 37 U/L Final    Globulin 10/14/2021 3.5  Not Established g/dL Final    Troponin 10/14/2021 <0.01  <0.01 ng/mL Final    Methodology by Troponin T    Color, UA 10/14/2021 YELLOW  Straw/Yellow Final    Clarity, UA 10/14/2021 Clear  Clear Final    Glucose, Ur 10/14/2021 Negative  Negative mg/dL Final    Bilirubin Urine 10/14/2021 Negative  Negative Final    Ketones, Urine 10/14/2021 Negative  Negative mg/dL Final    Specific Gravity, UA 10/14/2021 >1.030  1.005 - 1.030 Final    Blood, Urine 10/14/2021 Negative  Negative Final    pH, UA 10/14/2021 6.0  5.0 - 8.0 Final    Protein, UA 10/14/2021 Negative  Negative mg/dL Final    Urobilinogen, Urine 10/14/2021 0.2  <2.0 E.U./dL Final    Nitrite, Urine 10/14/2021 Negative  Negative Final    Leukocyte Esterase, Urine 10/14/2021 Negative  Negative Final    Microscopic Examination 10/14/2021 Not Indicated   Final    Urine Type 10/14/2021 Voided   Final    Urine received in a container without preservatives.     Urine Reflex to Culture 10/14/2021 Not Indicated   Final    POC Glucose 10/14/2021 110 (A) 70 - 99 mg/dl Final    Performed on 10/14/2021 ACCU-CHEK   Final    WBC 10/15/2021 7.7  4.0 - 11.0 K/uL Final    RBC 10/15/2021 4.14  4.00 - 5.20 M/uL Final    Hemoglobin 10/15/2021 11.6 (A) 12.0 - 16.0 g/dL Final    Hematocrit 10/15/2021 35.1 (A) 36.0 - 48.0 % Final    MCV 10/15/2021 84.8  80.0 - 100.0 fL Final    MCH 10/15/2021 28.1  26.0 - 34.0 pg Final    MCHC 10/15/2021 33.1  31.0 - 36.0 g/dL Final    RDW 10/15/2021 13.7  12.4 - 15.4 % Final    Platelets 25/68/7045 254  135 - 450 K/uL Final    MPV 10/15/2021 6.6  5.0 - 10.5 fL Final    Hemoglobin A1C 10/15/2021 5.7  See comment % Final    Comment: Comment:  Diagnosis of Diabetes: > or = 6.5%  Increased risk of diabetes (Prediabetes): 5.7-6.4%  Glycemic Control: Nonpregnant Adults: <7.0%                    Pregnant: <6.0%        eAG 10/15/2021 116.9  mg/dL Final    Cholesterol, Total 10/15/2021 184  0 - 199 mg/dL Final    Triglycerides 10/15/2021 53  0 - 150 mg/dL Final    HDL 10/15/2021 50  40 - 60 mg/dL Final    LDL Calculated 10/15/2021 123 (A) <100 mg/dL Final    VLDL Cholesterol Calculated 10/15/2021 11  Not Established mg/dL Final    Left Ventricular Ejection Fraction 10/15/2021 60   Final-Edited    LVEF MODALITY 10/15/2021 ECHO   Final-Edited     No family history on file. Current Outpatient Medications   Medication Sig Dispense Refill    rivaroxaban (XARELTO) 15 MG TABS tablet TAKE 1 TABLET BY MOUTH  DAILY WITH BREAKFAST 90 tablet 0    sotalol (BETAPACE) 80 MG tablet TAKE 1 TABLET BY MOUTH  TWICE DAILY 180 tablet 1    levothyroxine (SYNTHROID) 75 MCG tablet TAKE 1 TABLET BY MOUTH  DAILY 90 tablet 0    losartan (COZAAR) 100 MG tablet Take 1 tablet by mouth daily TAKE ONE TABLET BY MOUTH ONCE NIGHTLY 90 tablet 0    traMADol (ULTRAM) 50 MG tablet TAKE ONE TABLET BY MOUTH EVERY 6 HOURS AS NEEDED FOR PAIN 120 tablet 0    furosemide (LASIX) 40 MG tablet Take 1 tablet by mouth See Admin Instructions TAKE 1 TABLET BY MOUTH  DAILY AS NEEDED FOR  SWELLING/ WEIGHT GAIN 90 tablet 1    ipratropium (ATROVENT) 0.06 % nasal spray SPRAY ONE TO TWO SPRAYS INTO EACH NOSTRIL THREE TIMES A DAY AS NEEDED FOR RHINITIS 15 mL 1    spironolactone (ALDACTONE) 25 MG tablet TAKE 1 TABLET BY MOUTH  DAILY 90 tablet 3    hydrocortisone 2.5 % cream Apply topically 2 times daily. 56 g 1    gentamicin (GARAMYCIN) 0.1 % cream Apply topically daily.  1 Tube 0    omeprazole 20 MG EC tablet Take 20 mg by mouth daily      Multiple Vitamins-Minerals (THERAPEUTIC MULTIVITAMIN-MINERALS) tablet Take 1 tablet by mouth daily. diphenhydrAMINE (BENADRYL) 25 MG tablet Take 25 mg by mouth nightly as needed. diclofenac sodium (VOLTAREN) 1 % GEL Apply 4 grams to lower extremity joints and 2 grams to upper extremity joints as needed 4 times/day (Patient not taking: Reported on 8/4/2022) 500 g 2    tiZANidine (ZANAFLEX) 4 MG tablet 1 tab nightly and 1/2 tab during day prn muscle spasm/ tightness (Patient not taking: Reported on 8/4/2022) 45 tablet 1     No current facility-administered medications for this visit. Allergies   Allergen Reactions    Demerol     Dilaudid [Hydromorphone Hcl]     Hydrochlorothiazide     Macrobid [Nitrofurantoin Monohydrate Macrocrystals]     Other      FRESH FROZEN PLASMA- CAUSED SWELLING OF EYES AND MOUTH, HIVES! Sulfa Antibiotics     Tetanus Toxoids        Review of Systems    Objective:  Ht 5' 5\" (1.651 m)   Wt 158 lb (71.7 kg) Comment: SHOES ON  BMI 26.29 kg/m²     BP Readings from Last 3 Encounters:   10/15/21 127/79   07/20/21 130/80   05/10/21 130/82       Pulse Readings from Last 3 Encounters:   10/15/21 78   07/20/21 96   05/10/21 76       Wt Readings from Last 3 Encounters:   08/04/22 158 lb (71.7 kg)   10/15/21 163 lb 2.3 oz (74 kg)   07/20/21 161 lb (73 kg)       Physical Exam  Vitals and nursing note reviewed. Constitutional:       General: She is not in acute distress. Appearance: She is well-developed. HENT:      Head: Normocephalic and atraumatic. Mouth/Throat:      Pharynx: No oropharyngeal exudate. Eyes:      General: No scleral icterus. Conjunctiva/sclera: Conjunctivae normal.   Neck:      Thyroid: No thyromegaly. Cardiovascular:      Rate and Rhythm: Normal rate and regular rhythm. Heart sounds: Normal heart sounds. No murmur heard. Pulmonary:      Effort: Pulmonary effort is normal. No respiratory distress. Breath sounds: Normal breath sounds. No wheezing or rales.    Abdominal:      General: Bowel sounds are normal.

## 2022-08-05 LAB
BILIRUBIN, POC: NEGATIVE
BLOOD URINE, POC: NEGATIVE
CLARITY, POC: CLEAR
COLOR, POC: ABNORMAL
GLUCOSE URINE, POC: NEGATIVE
KETONES, POC: NEGATIVE
LEUKOCYTE EST, POC: ABNORMAL
NITRITE, POC: NEGATIVE
PH, POC: 5.5
PROTEIN, POC: ABNORMAL
SPECIFIC GRAVITY, POC: 1.02
UROBILINOGEN, POC: 0.2

## 2022-08-06 LAB — URINE CULTURE, ROUTINE: NORMAL

## 2022-08-08 RX ORDER — SPIRONOLACTONE 25 MG/1
25 TABLET ORAL DAILY
Qty: 90 TABLET | Refills: 3 | Status: SHIPPED | OUTPATIENT
Start: 2022-08-08

## 2022-08-08 RX ORDER — LEVOTHYROXINE SODIUM 0.07 MG/1
TABLET ORAL
Qty: 90 TABLET | Refills: 3 | Status: SHIPPED | OUTPATIENT
Start: 2022-08-08

## 2022-08-08 RX ORDER — SPIRONOLACTONE 25 MG/1
25 TABLET ORAL DAILY
Qty: 90 TABLET | Refills: 3 | OUTPATIENT
Start: 2022-08-08

## 2022-09-06 ENCOUNTER — TELEMEDICINE (OUTPATIENT)
Dept: FAMILY MEDICINE CLINIC | Age: 87
End: 2022-09-06
Payer: MEDICARE

## 2022-09-06 DIAGNOSIS — R05.9 COUGH: Primary | ICD-10-CM

## 2022-09-06 DIAGNOSIS — L89.893 PRESSURE ULCER OF OTHER SITE, STAGE 3 (HCC): ICD-10-CM

## 2022-09-06 PROCEDURE — 1036F TOBACCO NON-USER: CPT | Performed by: NURSE PRACTITIONER

## 2022-09-06 PROCEDURE — G8427 DOCREV CUR MEDS BY ELIG CLIN: HCPCS | Performed by: NURSE PRACTITIONER

## 2022-09-06 PROCEDURE — 1123F ACP DISCUSS/DSCN MKR DOCD: CPT | Performed by: NURSE PRACTITIONER

## 2022-09-06 PROCEDURE — G8417 CALC BMI ABV UP PARAM F/U: HCPCS | Performed by: NURSE PRACTITIONER

## 2022-09-06 PROCEDURE — 1090F PRES/ABSN URINE INCON ASSESS: CPT | Performed by: NURSE PRACTITIONER

## 2022-09-06 PROCEDURE — 99214 OFFICE O/P EST MOD 30 MIN: CPT | Performed by: NURSE PRACTITIONER

## 2022-09-06 RX ORDER — DOXYCYCLINE HYCLATE 100 MG
100 TABLET ORAL 2 TIMES DAILY
Qty: 14 TABLET | Refills: 0 | Status: SHIPPED | OUTPATIENT
Start: 2022-09-06 | End: 2022-09-13

## 2022-09-06 ASSESSMENT — ENCOUNTER SYMPTOMS
SHORTNESS OF BREATH: 0
NAUSEA: 0
SINUS PAIN: 1
DIARRHEA: 0
COLOR CHANGE: 0
ABDOMINAL PAIN: 0
BACK PAIN: 0
COUGH: 1
CHEST TIGHTNESS: 0
EYE DISCHARGE: 0
SINUS PRESSURE: 1
ABDOMINAL DISTENTION: 0
CONSTIPATION: 0
VOICE CHANGE: 1

## 2022-09-06 NOTE — ASSESSMENT & PLAN NOTE
Resolved per pt and son, had followed with [facility] around that time from [facility]

## 2022-09-06 NOTE — PROGRESS NOTES
Yandy Power (:  3/31/1927) is a 80 y.o. female,Established patient, here for evaluation of the following chief complaint(s): Cough (COUGH X 4 DAYS - MUCUS YELLOW - NO FEVER )      Yandy Power, was evaluated through a synchronous (real-time) audio-video encounter. The patient (or guardian if applicable) is aware that this is a billable service, which includes applicable co-pays. This Virtual Visit was conducted with patient's (and/or legal guardian's) consent. The visit was conducted pursuant to the emergency declaration under the [facility], [address] [place] [address] authority and the [employer] General Act. Patient identification was verified, and a caregiver was present when appropriate. The patient was located at home in a state where the provider was licensed to provide care. Patient identification was verified at the start of the visit: Yes    ASSESSMENT/PLAN:  1. Cough  -     doxycycline hyclate (VIBRA-TABS) 100 MG tablet; Take 1 tablet by mouth 2 times daily for 7 days, Disp-14 tablet, R-0Normal   Encouraged pt son to test pt for COVID again as with current strand we are seeing pt's get false negatives on day 1-3 of symptoms, pt currently on day 4 of symptoms.  Encouraged to test before starting antibiotics   If positive for COVID, I do not believe pt is candidate for paxlovid because of her xarelto or sotalol, could consider antibodies or just mucinex and stronger cough suppressant    Consider abx following 2 week COVID for COVID pneumonia if needed but abx during COVID would not benefit her if this is COVID   If COVID test negative today on Day 4, OK to start antibiotics   Take medication in its entirety even if feeling better to avoid a resistance to antibiotics   Mucinex twice daily   Hot showers   Vicks vapo rub to chest, under nares   Humidifier or vaporizer near bed where sleeping   Sinus rinse as needed/Netti pot   Push fluids, increase protein intake   Cough and deep breathe   OTC (over the counter) Cough suppressant at night to aid with sleep- robitussin, delsym   If breathing issues, cough and deep breathe, lay on belly (prone) to aid with oxygenating lungs if possibly COVID   Vitamin C, zinc, and vitamin D supplements to boost immune system   Cepacol/throat lozenges for sore throat and cough   Warm tea/tea with honey for cough and sore throat   Warm water or salt water gargle for sore throat   If given antibiotic, take in its entirety until completion to decrease antibiotic resistance developing   Tylenol and ibuprofen for aches and pains and fever >100 F  2. Pressure ulcer of other site, stage 3 [facility])  Assessment & Plan:    Resolved per pt and son, had followed with [facility] around that time from [address]       Return in about 18 days (around 9/24/2022) for Annual Wellness Visit, Fasting Labs. SUBJECTIVE/OBJECTIVE:  HPI    Chief Complaint   Patient presents with    Cough     COUGH X 4 DAYS - MUCUS YELLOW - NO FEVER      Cough  Patient complains of productive cough with sputum described as yellow. Symptoms began 4 days ago. Symptoms have been gradually worsening since that time. The cough is productive of yellow sputum and is aggravated by nothing. Associated symptoms include: postnasal drip. Patient does not have new pets. Patient does not have a history of asthma. Patient does have a history of environmental allergens. Patient has not traveled recently. Patient does not have a history of smoking. Patient has not had a previous chest x-ray. Patient has not had a PPD done. Entire household sick right now, likely caught whatever her son has. Son also has cough and congestion as well. All others have also tested negative for COVID as well. Discussed potentially testing too soon for COVID and could maybe still have COVID or something viral and we are seeing positive tests day 3-5 after negative tests day 1-3.  Last abx Dec 2021. Review of Systems   Constitutional:  Negative for activity change, appetite change, fatigue, fever and unexpected weight change. HENT:  Positive for congestion, postnasal drip, sinus pressure, sinus pain and voice change. Negative for ear pain. Eyes:  Negative for discharge and visual disturbance. Respiratory:  Positive for cough. Negative for chest tightness and shortness of breath. Cardiovascular:  Negative for chest pain, palpitations and leg swelling. Gastrointestinal:  Negative for abdominal distention, abdominal pain, constipation, diarrhea and nausea. Endocrine: Negative for cold intolerance, heat intolerance, polydipsia, polyphagia and polyuria. Genitourinary:  Negative for decreased urine volume, difficulty urinating, dysuria, flank pain, frequency and urgency. Musculoskeletal:  Negative for arthralgias, back pain, gait problem, joint swelling, myalgias and neck pain. Skin:  Negative for color change, rash and wound. Allergic/Immunologic: Negative for food allergies and immunocompromised state. Neurological:  Negative for dizziness, tremors, speech difficulty, weakness, light-headedness, numbness and headaches. Hematological:  Negative for adenopathy. Does not bruise/bleed easily. Psychiatric/Behavioral:  Negative for confusion, decreased concentration, self-injury, sleep disturbance and suicidal ideas. The patient is not nervous/anxious.       Patient-Reported Vitals 9/23/2021   Patient-Reported Weight -   Patient-Reported Height 5'   Patient-Reported Systolic -   Patient-Reported Diastolic -   Patient-Reported Temperature -            [INSTRUCTIONS:  \"[x]\" Indicates a positive item  \"[]\" Indicates a negative item  -- DELETE ALL ITEMS NOT EXAMINED]    Constitutional: [x] Appears well-developed and well-nourished [x] No apparent distress      [] Abnormal -     Mental status: [x] Alert and awake  [x] Oriented to person/place/time [x] Able to follow commands    [] Abnormal -     Eyes:   EOM    [x]  Normal    [] Abnormal -   Sclera  [x]  Normal    [] Abnormal -          Discharge [x]  None visible   [] Abnormal -     HENT: [x] Normocephalic, atraumatic  [] Abnormal -   [x] Mouth/Throat: Mucous membranes are moist    External Ears [x] Normal  [] Abnormal -    Neck: [x] No visualized mass [] Abnormal -     Pulmonary/Chest: [x] Respiratory effort normal   [x] No visualized signs of difficulty breathing or respiratory distress        [] Abnormal -      Musculoskeletal:   [x] Normal gait with no signs of ataxia         [x] Normal range of motion of neck        [] Abnormal -     Neurological:        [x] No Facial Asymmetry (Cranial nerve 7 motor function) (limited exam due to video visit)          [x] No gaze palsy        [] Abnormal -          Skin:        [x] No significant exanthematous lesions or discoloration noted on facial skin         [] Abnormal -            Psychiatric:       [x] Normal Affect [] Abnormal -        [x] No Hallucinations    Other pertinent observable physical exam findings:-      On this date 9/6/2022 I have spent 32 minutes reviewing previous notes, test results and face to face (virtual) with the patient discussing the diagnosis and importance of compliance with the treatment plan as well as documenting on the day of the visit. Care Gaps Addressed  Call insurance company to discuss coverage for shingles vaccine (Shingrix) 2 dose series   COVID booster recommended  Flu vaccine recommended   AWV due later this month    I have reviewed patient's pertinent medical history, relevant laboratory and imaging studies, and past/future health maintenance. Discussed with the patient the importance of adhering to their current medication regimen as directed. Advised the patient that they should continue to work on eating a healthy balanced diet and staying active by exercising within their personal limits. Orders as listed above.  Patient was advised to keep future appointments with their respective specialty care team(s). Patient had the opportunity to ask questions, all of which were answered to the best of my ability and with patient satisfaction. Patient understands and is agreeable with the care plan following today's visit. Patient is to schedule an appointment for any new or worsening symptoms. Go to ER for significant shortness of breath, chest pain, or uncontrolled pain or fever. I discussed with patient the risk and benefits of any medications that were prescribed today. I verified that the patient understands their medications, labs, and/or procedures. The patient is doing well with current medication regimen and does not have any barriers to adherence. The patient's self-management abilities are good. Follow Up in 1 Months for AWV and fasting labs, virtual if pt preference     Paticia Skiff is a 80 y.o. female being evaluated by a Virtual Visit (video visit) encounter to address concerns as mentioned above. A caregiver was present when appropriate. Due to this being a TeleHealth encounter (During [facility] public health emergency), evaluation of the following organ systems was limited: Vitals/Constitutional/EENT/Resp/CV/GI//MS/Neuro/Skin/Heme-Lymph-Imm. Pursuant to the emergency declaration under the [facility] [address] authority and the [employer] and [employer] Act, this Virtual Visit was conducted with patient's (and/or legal guardian's) consent, to reduce the patient's risk of exposure to COVID-19 and provide necessary medical care. The patient (and/or legal guardian) has also been advised to contact this office for worsening conditions or problems, and seek emergency medical treatment and/or call 911 if deemed necessary. Services were provided through a video synchronous discussion virtually to substitute for in-person clinic visit.  Patient was located at home and provider was located in office or at home. An electronic signature was used to authenticate this note.     --Gamaliel Freeman, APRJONH - CNP

## 2022-09-21 DIAGNOSIS — M19.90 ARTHRITIS: ICD-10-CM

## 2022-09-21 RX ORDER — TRAMADOL HYDROCHLORIDE 50 MG/1
50 TABLET ORAL EVERY 6 HOURS PRN
Qty: 120 TABLET | Refills: 0 | Status: SHIPPED | OUTPATIENT
Start: 2022-09-21 | End: 2022-12-20

## 2022-09-21 RX ORDER — LOSARTAN POTASSIUM 100 MG/1
100 TABLET ORAL DAILY
Qty: 90 TABLET | Refills: 0 | Status: SHIPPED | OUTPATIENT
Start: 2022-09-21

## 2022-12-11 ENCOUNTER — PATIENT MESSAGE (OUTPATIENT)
Dept: FAMILY MEDICINE CLINIC | Age: 87
End: 2022-12-11

## 2022-12-12 RX ORDER — FUROSEMIDE 40 MG/1
40 TABLET ORAL SEE ADMIN INSTRUCTIONS
Qty: 90 TABLET | Refills: 1 | Status: SHIPPED | OUTPATIENT
Start: 2022-12-12

## 2022-12-12 RX ORDER — FUROSEMIDE 40 MG/1
40 TABLET ORAL SEE ADMIN INSTRUCTIONS
Qty: 90 TABLET | Refills: 1 | OUTPATIENT
Start: 2022-12-12

## 2022-12-12 RX ORDER — FUROSEMIDE 40 MG/1
TABLET ORAL
Qty: 90 TABLET | Refills: 3 | OUTPATIENT
Start: 2022-12-12

## 2022-12-12 NOTE — TELEPHONE ENCOUNTER
From: Sean Ann  To: Dr. Marti Trevizo: 12/11/2022 8:02 AM EST  Subject: Prescription refill needed    Hello,  Need a refill of Xarelto 15mg and Furosemide 40mg. Request has already been made to the pharmacy ([facility]), but Xarelto is not on the My Chart list of prescriptions. Please authorize with [facility] ASAP, as I will be out of Xarelto by the end of the week.   Thanks,  Kalina Walsh

## 2022-12-20 RX ORDER — LOSARTAN POTASSIUM 100 MG/1
100 TABLET ORAL DAILY
Qty: 30 TABLET | Refills: 0 | Status: SHIPPED | OUTPATIENT
Start: 2022-12-20

## 2022-12-28 ENCOUNTER — TELEPHONE (OUTPATIENT)
Dept: FAMILY MEDICINE CLINIC | Age: 87
End: 2022-12-28

## 2022-12-28 RX ORDER — LOSARTAN POTASSIUM 100 MG/1
100 TABLET ORAL DAILY
Qty: 90 TABLET | Refills: 0 | Status: SHIPPED | OUTPATIENT
Start: 2022-12-28

## 2022-12-29 ENCOUNTER — NURSE ONLY (OUTPATIENT)
Dept: FAMILY MEDICINE CLINIC | Age: 87
End: 2022-12-29
Payer: MEDICARE

## 2022-12-29 DIAGNOSIS — Z23 NEED FOR IMMUNIZATION AGAINST INFLUENZA: Primary | ICD-10-CM

## 2022-12-29 PROCEDURE — G0008 ADMIN INFLUENZA VIRUS VAC: HCPCS | Performed by: FAMILY MEDICINE

## 2022-12-29 PROCEDURE — 90694 VACC AIIV4 NO PRSRV 0.5ML IM: CPT | Performed by: FAMILY MEDICINE

## 2022-12-29 NOTE — PROGRESS NOTES
Immunizations Administered       Name Date Dose Route    Influenza, FLUAD, (age 72 y+), Adjuvanted, 0.5mL 12/29/2022 0.5 mL Intramuscular    Site: Dorsogluteal- Right    Lot: [id number]    [address] [facility] [address]: [phone]

## 2023-01-13 RX ORDER — SOTALOL HYDROCHLORIDE 80 MG/1
TABLET ORAL
Qty: 90 TABLET | Refills: 1 | Status: SHIPPED | OUTPATIENT
Start: 2023-01-13

## 2023-01-13 NOTE — TELEPHONE ENCOUNTER
LV 8/4/22 WITH DR MUNIZ FOR MEMORY CHANGE NV NONE  Return in about 3 months (around 11/4/2022).   PLEASE CALL PT  TO SCHEDULE APPT  FOR AWV /HTN

## 2023-02-02 ENCOUNTER — OFFICE VISIT (OUTPATIENT)
Dept: FAMILY MEDICINE CLINIC | Age: 88
End: 2023-02-02

## 2023-02-02 VITALS
BODY MASS INDEX: 27.66 KG/M2 | HEIGHT: 65 IN | SYSTOLIC BLOOD PRESSURE: 118 MMHG | OXYGEN SATURATION: 99 % | DIASTOLIC BLOOD PRESSURE: 68 MMHG | HEART RATE: 80 BPM | WEIGHT: 166 LBS | RESPIRATION RATE: 20 BRPM

## 2023-02-02 DIAGNOSIS — J31.0 CHRONIC RHINITIS: ICD-10-CM

## 2023-02-02 DIAGNOSIS — I50.42 CHRONIC COMBINED SYSTOLIC AND DIASTOLIC CONGESTIVE HEART FAILURE (HCC): ICD-10-CM

## 2023-02-02 DIAGNOSIS — D64.9 MILD ANEMIA: ICD-10-CM

## 2023-02-02 DIAGNOSIS — M19.90 ARTHRITIS: ICD-10-CM

## 2023-02-02 DIAGNOSIS — L89.893 PRESSURE ULCER OF OTHER SITE, STAGE 3 (HCC): ICD-10-CM

## 2023-02-02 DIAGNOSIS — L82.1 SEBORRHEIC KERATOSES: ICD-10-CM

## 2023-02-02 DIAGNOSIS — R41.3 MEMORY CHANGE: ICD-10-CM

## 2023-02-02 DIAGNOSIS — E03.9 ACQUIRED HYPOTHYROIDISM: ICD-10-CM

## 2023-02-02 DIAGNOSIS — N18.30 STAGE 3 CHRONIC KIDNEY DISEASE, UNSPECIFIED WHETHER STAGE 3A OR 3B CKD (HCC): ICD-10-CM

## 2023-02-02 DIAGNOSIS — E78.00 HYPERCHOLESTEREMIA: ICD-10-CM

## 2023-02-02 DIAGNOSIS — Z00.00 MEDICARE ANNUAL WELLNESS VISIT, SUBSEQUENT: Primary | ICD-10-CM

## 2023-02-02 DIAGNOSIS — I48.91 ATRIAL FIBRILLATION, UNSPECIFIED TYPE (HCC): ICD-10-CM

## 2023-02-02 DIAGNOSIS — Z85.09 HISTORY OF CANCER OF GALLBLADDER: ICD-10-CM

## 2023-02-02 RX ORDER — IPRATROPIUM BROMIDE 42 UG/1
2 SPRAY, METERED NASAL 4 TIMES DAILY
Qty: 15 ML | Refills: 3 | Status: SHIPPED | OUTPATIENT
Start: 2023-02-02

## 2023-02-02 SDOH — ECONOMIC STABILITY: FOOD INSECURITY: WITHIN THE PAST 12 MONTHS, YOU WORRIED THAT YOUR FOOD WOULD RUN OUT BEFORE YOU GOT MONEY TO BUY MORE.: NEVER TRUE

## 2023-02-02 SDOH — ECONOMIC STABILITY: INCOME INSECURITY: HOW HARD IS IT FOR YOU TO PAY FOR THE VERY BASICS LIKE FOOD, HOUSING, MEDICAL CARE, AND HEATING?: NOT HARD AT ALL

## 2023-02-02 SDOH — ECONOMIC STABILITY: HOUSING INSECURITY
IN THE LAST 12 MONTHS, WAS THERE A TIME WHEN YOU DID NOT HAVE A STEADY PLACE TO SLEEP OR SLEPT IN A SHELTER (INCLUDING NOW)?: NO

## 2023-02-02 SDOH — ECONOMIC STABILITY: FOOD INSECURITY: WITHIN THE PAST 12 MONTHS, THE FOOD YOU BOUGHT JUST DIDN'T LAST AND YOU DIDN'T HAVE MONEY TO GET MORE.: NEVER TRUE

## 2023-02-02 ASSESSMENT — PATIENT HEALTH QUESTIONNAIRE - PHQ9
SUM OF ALL RESPONSES TO PHQ QUESTIONS 1-9: 0
SUM OF ALL RESPONSES TO PHQ QUESTIONS 1-9: 0
1. LITTLE INTEREST OR PLEASURE IN DOING THINGS: 0
SUM OF ALL RESPONSES TO PHQ QUESTIONS 1-9: 0
SUM OF ALL RESPONSES TO PHQ QUESTIONS 1-9: 0
2. FEELING DOWN, DEPRESSED OR HOPELESS: 0
SUM OF ALL RESPONSES TO PHQ9 QUESTIONS 1 & 2: 0

## 2023-02-02 ASSESSMENT — LIFESTYLE VARIABLES: HOW OFTEN DO YOU HAVE A DRINK CONTAINING ALCOHOL: NEVER

## 2023-02-02 NOTE — PATIENT INSTRUCTIONS
Preventing Falls: Care Instructions  Overview     Getting around your home safely can be a challenge if you have injuries or health problems that make it easy for you to fall. Loose rugs and furniture in walkways are among the dangers for many older people who have problems walking or who have poor eyesight. People who have conditions such as arthritis, osteoporosis, or dementia also have to be careful not to fall. You can make your home safer with a few simple measures. Follow-up care is a key part of your treatment and safety. Be sure to make and go to all appointments, and call your doctor if you are having problems. It's also a good idea to know your test results and keep a list of the medicines you take. How can you care for yourself at home? Taking care of yourself  Exercise regularly to improve your strength, muscle tone, and balance. Walk if you can. Swimming may be a good choice if you cannot walk easily. Have your vision and hearing checked each year or any time you notice a change. If you have trouble seeing and hearing, you might not be able to avoid objects and could lose your balance. Know the side effects of the medicines you take. Ask your doctor or pharmacist whether the medicines you take can affect your balance. Sleeping pills or sedatives can affect your balance. Limit the amount of alcohol you drink. Alcohol can impair your balance and other senses. Ask your doctor whether calluses or corns on your feet need to be removed. If you wear loose-fitting shoes because of calluses or corns, you can lose your balance and fall. Talk to your doctor if you have numbness in your feet. You may get dizzy if you do not drink enough water. To prevent dehydration, drink plenty of fluids. Choose water and other clear liquids. If you have kidney, heart, or liver disease and have to limit fluids, talk with your doctor before you increase the amount of fluids you drink.   Preventing falls at home  Remove raised doorway thresholds, throw rugs, and clutter. Repair loose carpet or raised areas in the floor. Move furniture and electrical cords to keep them out of walking paths. Use nonskid floor wax, and wipe up spills right away, especially on ceramic tile floors. If you use a walker or cane, put rubber tips on it. If you use crutches, clean the bottoms of them regularly with an abrasive pad, such as steel wool. Keep your house well lit, especially stairways, porches, and outside walkways. Use night-lights in areas such as hallways and bathrooms. Add extra light switches or use remote switches (such as switches that go on or off when you clap your hands) to make it easier to turn lights on if you have to get up during the night. Install sturdy handrails on stairways. Move items in your cabinets so that the things you use a lot are on the lower shelves (about waist level). Keep a cordless phone and a flashlight with new batteries by your bed. If possible, put a phone in each of the main rooms of your house, or carry a cell phone in case you fall and cannot reach a phone. Or, you can wear a device around your neck or wrist. You push a button that sends a signal for help. Wear low-heeled shoes that fit well and give your feet good support. Use footwear with nonskid soles. Check the heels and soles of your shoes for wear. Repair or replace worn heels or soles. Do not wear socks without shoes on smooth floors, such as wood. Walk on the grass when the sidewalks are slippery. If you live in an area that gets snow and ice in the winter, sprinkle salt on slippery steps and sidewalks. Or ask a family member or friend to do this for you. Preventing falls in the bath  Install grab bars and nonskid mats inside and outside your shower or tub and near the toilet and sinks. Use shower chairs and bath benches. Use a hand-held shower head that will allow you to sit while showering.   Get into a tub or shower by putting the weaker leg in first. Get out of a tub or shower with your strong side first.  Repair loose toilet seats and consider installing a raised toilet seat to make getting on and off the toilet easier. Keep your bathroom door unlocked while you are in the shower. Where can you learn more? Go to http://www.nolen.com/ and enter G117 to learn more about \"Preventing Falls: Care Instructions. \"  Current as of: May 4, 2022               Content Version: 13.5  © 9897-4807 [employer]. Care instructions adapted under license by [facility] [address] If you have questions about a medical condition or this instruction, always ask your healthcare professional. [place] [address] any warranty or liability for your use of this information. Learning About Mild Cognitive Impairment (MCI)  What is mild cognitive impairment (MCI)? It's common to forget things sometimes as we get older. But some older people have memory loss that's more than normal aging. It's called mild cognitive impairment, or MCI. It is not the same as dementia. People with the condition often know that their memory or mental function has changed. Tests may show some loss. But their minds work well overall. They can carry out daily tasks that are normal for them. People with MCI have a higher chance of one day getting dementia. But not all people who have it will get dementia. Some people may stay the same over time. What are the symptoms? People with MCI have more memory loss than what occurs with normal aging. They may have increasing trouble with recalling words and keeping up with conversations. They may also have trouble remembering important events and making decisions. What puts you at risk? The risk of getting MCI increases with age. Having high blood pressure or having a family history of MCI may also increase your risk. How is it diagnosed?   Your doctor will do a physical exam.  Wayne Barbour may be asked questions to check your memory and other mental skills. Your doctor may also talk to close friends and family members. This can help the doctor figure out how your memory and other mental skills have changed. You may get blood tests and tests that look at your brain. These questions and tests can make sure you don't have other conditions that can cause symptoms like MCI. These include depression, sleep problems, and side effects from medicines. How is it treated? There are no medicines to treat MCI or to keep it from progressing to dementia. But treating conditions like high blood pressure and diabetes may help. A person with MCI needs routine follow-up visits with their doctor to check on changes in the person's mental skills. How can you care for yourself at home? Keeping your body active can help slow MCI. Exercises like walking can help. Try to stay active mentally too. Read or do things like crossword puzzles if you enjoy doing them. If you need help coping with MCI, you may want to get support from family, friends, a support group, or a counselor who works with people who have [address] Though the future isn't always clear, it can be good to plan ahead with instructions for your care. These are called advanced directives. Having a plan can help make sure that you get the care you want. Current as of: August 25, 2022               Content Version: 13.5  © 2006-2022 Healthwise, Incorporated. Care instructions adapted under license by [facility] ([facility]). If you have questions about a medical condition or this instruction, always ask your healthcare professional. [place] [address] any warranty or liability for your use of this information. Learning About Being Active as an Older Adult  Why is being active important as you get older? Being active is one of the best things you can do for your health. And it's never too late to start.  Being active--or getting active, if you aren't already--has definite benefits. It can:  Give you more energy,  Keep your mind sharp. Improve balance to reduce your risk of falls. Help you manage chronic illness with fewer medicines. No matter how old you are, how fit you are, or what health problems you have, there is a form of activity that will work for you. And the more physical activity you can do, the better your overall health will be. What kinds of activity can help you stay healthy? Being more active will make your daily activities easier. Physical activity includes planned exercise and things you do in daily life. There are four types of activity:  Aerobic. Doing aerobic activity makes your heart and lungs strong. Includes walking, dancing, and gardening. Aim for at least 2½ hours spread throughout the week. It improves your energy and can help you sleep better. Muscle-strengthening. This type of activity can help maintain muscle and strengthen bones. Includes climbing stairs, using resistance bands, and lifting or carrying heavy loads. Aim for at least twice a week. It can help protect the knees and other joints. Stretching. Stretching gives you better range of motion in joints and muscles. Includes upper arm stretches, calf stretches, and gentle yoga. Aim for at least twice a week, preferably after your muscles are warmed up from other activities. It can help you function better in daily life. Balancing. This helps you stay coordinated and have good posture. Includes heel-to-toe walking, jennifer chi, and certain types of yoga. Aim for at least 3 days a week. It can reduce your risk of falling. Even if you have a hard time meeting the recommendations, it's better to be more active than less active. All activity done in each category counts toward your weekly total. You'd be surprised how daily things like carrying groceries, keeping up with grandchildren, and taking the stairs can add up.   What keeps you from being active? If you've had a hard time being more active, you're not alone. Maybe you remember being able to do more. Or maybe you've never thought of yourself as being active. It's frustrating when you can't do the things you want. Being more active can help. What's holding you back? Getting started. Have a goal, but break it into easy tasks. Small steps build into big accomplishments. Staying motivated. If you feel like skipping your activity, remember your goal. Maybe you want to move better and stay independent. Every activity gets you one step closer. Not feeling your best.  Start with 5 minutes of an activity you enjoy. Prove to yourself you can do it. As you get comfortable, increase your time. You may not be where you want to be. But you're in the process of getting there. Everyone starts somewhere. How can you find safe ways to stay active? Talk with your doctor about any physical challenges you're facing. Make a plan with your doctor if you have a health problem or aren't sure how to get started with activity. If you're already active, ask your doctor if there is anything you should change to stay safe as your body and health change. If you tend to feel dizzy after you take medicine, avoid activity at that time. Try being active before you take your medicine. This will reduce your risk of falls. If you plan to be active at home, make sure to clear your space before you get started. Remove things like TV cords, coffee tables, and throw rugs. It's safest to have plenty of space to move freely. The key to getting more active is to take it slow and steady. Try to improve only a little bit at a time. Pick just one area to improve on at first. And if an activity hurts, stop and talk to your doctor. Where can you learn more? Go to http://www.nolen.com/ and enter P600 to learn more about \"Learning About Being Active as an Older Adult. \"  Current as of: October 10, 5174               PFJFZZZ Version: 13.5  © 3734-9762 Healthwise, [employer]. Care instructions adapted under license by [facility] ([facility]). If you have questions about a medical condition or this instruction, always ask your healthcare professional. [address] any warranty or liability for your use of this information. Learning About Dental Care for Older Adults  Dental care for older adults: Overview  Dental care for older people is much the same as for younger adults. But older adults do have concerns that younger adults do not. Older adults may have problems with gum disease and decay on the roots of their teeth. They may need missing teeth replaced or broken fillings fixed. Or they may have dentures that need to be cared for. Some older adults may have trouble holding a toothbrush. You can help remind the person you are caring for to brush and floss their teeth or to clean their dentures. In some cases, you may need to do the brushing and other dental care tasks. People who have trouble using their hands or who have dementia may need this extra help. How can you help with dental care? Normal dental care  To keep the teeth and gums healthy:  Brush the teeth with fluoride toothpaste twice a day--in the morning and at night--and floss at least once a day. Plaque can quickly build up on the teeth of older adults. Watch for the signs of gum disease. These signs include gums that bleed after brushing or after eating hard foods, such as apples. See a dentist regularly. Many experts recommend checkups every 6 months. Keep the dentist up to date on any new medications the person is taking. Encourage a balanced diet that includes whole grains, vegetables, and fruits, and that is low in saturated fat and sodium. Encourage the person you're caring for not to use tobacco products. They can affect dental and general health. Many older adults have a fixed income and feel that they can't afford dental care.  But most [facility] have programs in which dentists help older adults by lowering fees. Contact your area's public health offices or  for information about dental care in your area.  Using a toothbrush  Older adults with arthritis sometimes have trouble brushing their teeth because they can't easily hold the toothbrush. Their hands and fingers may be stiff, painful, or weak. If this is the case, you can:  Offer an electric toothbrush.  Enlarge the handle of a non-electric toothbrush by wrapping a sponge, an elastic bandage, or adhesive tape around it.  Push the toothbrush handle through a ball made of rubber or soft foam.  Make the handle longer and thicker by taping Popsicle sticks or tongue depressors to it.  You may also be able to buy special toothbrushes, toothpaste dispensers, and floss holders.  Your doctor may recommend a soft-bristle toothbrush if the person you care for bleeds easily. Bleeding can happen because of a health problem or from certain medicines.  A toothpaste for sensitive teeth may help if the person you care for has sensitive teeth.  How do you brush and floss someone's teeth?  If the person you are caring for has a hard time cleaning their teeth on their own, you may need to brush and floss their teeth for them. It may be easiest to have the person sit and face away from you, and to sit or stand behind them. That way you can steady their head against your arm as you reach around to floss and brush their teeth. Choose a place that has good lighting and is comfortable for both of you.  Before you begin, gather your supplies. You will need gloves, floss, a toothbrush, and a container to hold water if you are not near a sink. Wash and dry your hands well and put on gloves. Start by flossing:  Gently work a piece of floss between each of the teeth toward the gums. A plastic flossing tool may make this easier, and they are available at most [facility].  Curve the floss around  each tooth into a U-shape and gently slide it under the gum line. Move the floss firmly up and down several times to scrape off the plaque. After you've finished flossing, throw away the used floss and begin brushing:  Wet the brush and apply toothpaste. Place the brush at a 45-degree angle where the teeth meet the gums. Press firmly, and move the brush in small circles over the surface of the teeth. Be careful not to brush too hard. Vigorous brushing can make the gums pull away from the teeth and can scratch the tooth enamel. Brush all surfaces of the teeth, on the tongue side and on the cheek side. Pay special attention to the front teeth and all surfaces of the back teeth. Brush chewing surfaces with short back-and-forth strokes. After you've finished, help the person rinse the remaining toothpaste from their mouth. Where can you learn more? Go to http://www.woods.com/ and enter F944 to learn more about \"Learning About Dental Care for Older Adults. \"  Current as of: June 16, 2022               Content Version: 13.5  © 7606-9748 [employer]. Care instructions adapted under license by [facility] ([facility]). If you have questions about a medical condition or this instruction, always ask your healthcare professional. [address] any warranty or liability for your use of this information. Hearing Loss: Care Instructions  Overview     Hearing loss is a sudden or slow decrease in how well you hear. It can range from mild to severe. Permanent hearing loss can occur with aging. It also can happen when you are exposed long-term to loud noise. Examples include listening to loud music, riding motorcycles, or being around other loud machines. Hearing loss can affect your work and home life. It can make you feel lonely or depressed. You may feel that you have lost your independence.  But hearing aids and other devices can help you hear better and feel connected to others. Follow-up care is a key part of your treatment and safety. Be sure to make and go to all appointments, and call your doctor if you are having problems. It's also a good idea to know your test results and keep a list of the medicines you take. How can you care for yourself at home? Avoid loud noises whenever possible. This helps keep your hearing from getting worse. Always wear hearing protection around loud noises. Wear a hearing aid as directed. See a professional who can help you pick a hearing aid that fits you. Have hearing tests as your doctor suggests. They can show whether your hearing has changed. Your hearing aid may need to be adjusted. Use other devices as needed. These may include:  Telephone amplifiers and hearing aids that can connect to a television, stereo, radio, or microphone. Devices that use lights or vibrations. These alert you to the doorbell, a ringing telephone, or a baby monitor. Television closed-captioning. This shows the words at the bottom of the screen. Most new TVs can do this. TTY (text telephone). This lets you type messages back and forth on the telephone instead of talking or listening. These devices are also called TDD. When messages are typed on the keyboard, they are sent over the phone line to a receiving TTY. The message is shown on a monitor. Use text messaging, social media, and email if it is hard for you to communicate by telephone. Try to learn a listening technique called speechreading. It is not lipreading. You pay attention to people's gestures, expressions, posture, and tone of voice. These clues can help you understand what a person is saying. Face the person you are talking to, and have them face you. Make sure the lighting is good. You need to see the other person's face clearly. Think about counseling if you need help to adjust to your hearing loss. When should you call for help?   Watch closely for changes in your health, and be sure to contact your doctor if:    You think your hearing is getting worse.     You have new symptoms, such as dizziness or nausea. Where can you learn more? Go to http://www.nolen.com/ and enter R798 to learn more about \"Hearing Loss: Care Instructions. \"  Current as of: May 4, 2022               Content Version: 13.5  © 2006-2022 [employer]. Care instructions adapted under license by [facility] ([facility]). If you have questions about a medical condition or this instruction, always ask your healthcare professional. [place] [address] any warranty or liability for your use of this information. Learning About Vision Tests  What are vision tests? The four most common vision tests are visual acuity tests, refraction, visual field tests, and color vision tests. Visual acuity (sharpness) tests  These tests are used: To see if you need glasses or contact lenses. To monitor an eye problem. To check an eye injury. Visual acuity tests are done as part of routine exams. You may also have this test when you get your 's license or apply for some types of jobs. Visual field tests  These tests are used: To check for vision loss in any area of your range of vision. To screen for certain eye diseases. To look for nerve damage after a stroke, head injury, or other problem that could reduce blood flow to the brain. Refraction and color tests  A refraction test is done to find the right prescription for glasses and contact lenses. A color vision test is done to check for color blindness. Color vision is often tested as part of a routine exam. You may also have this test when you apply for a job where recognizing different colors is important, such as , electronics, or the [employer]. How are vision tests done? Visual acuity test   You cover one eye at a time. You read aloud from a wall chart across the room.   You read aloud from a small card that you hold in your hand. Refraction   You look into a special device. The device puts lenses of different strengths in front of each eye to see how strong your glasses or contact lenses need to be. Visual field tests   Your doctor may have you look through special machines. Or your doctor may simply have you stare straight ahead while they move a finger into and out of your field of vision. Color vision test   You look at pieces of printed test patterns in various colors. You say what number or symbol you see. Your doctor may have you trace the number or symbol using a pointer. How do these tests feel? There is very little chance of having a problem from this test. If dilating drops are used for a vision test, they may make the eyes sting and cause a medicine taste in the mouth. Follow-up care is a key part of your treatment and safety. Be sure to make and go to all appointments, and call your doctor if you are having problems. It's also a good idea to know your test results and keep a list of the medicines you take. Where can you learn more? Go to http://OfferIQ.nolen.com/ and enter G551 to learn more about \"Learning About Vision Tests. \"  Current as of: October 12, 2022               Content Version: 13.5  © 6946-4298 [email or web address]. Care instructions adapted under license by [facility] ([facility]). If you have questions about a medical condition or this instruction, always ask your healthcare professional. [place] [address] any warranty or liability for your use of this information. Advance Directives: Care Instructions  Overview  An advance directive is a legal way to state your wishes at the end of your life. It tells your family and your doctor what to do if you can't say what you want. There are two main types of advance directives. You can change them any time your wishes change. Living will.   This form tells your family and your doctor your wishes about life support and other treatment. The form is also called a declaration. Medical power of . This form lets you name a person to make treatment decisions for you when you can't speak for yourself. This person is called a health care agent (health care proxy, health care surrogate). The form is also called a durable power of  for health care. If you do not have an advance directive, decisions about your medical care may be made by a family member, or by a doctor or a  who doesn't know you. It may help to think of an advance directive as a gift to the people who care for you. If you have one, they won't have to make tough decisions by themselves. For more information, including forms for your state, see the [address] website (www.caringinfo.org/planning/advance-directives/). Follow-up care is a key part of your treatment and safety. Be sure to make and go to all appointments, and call your doctor if you are having problems. It's also a good idea to know your test results and keep a list of the medicines you take. What should you include in an advance directive? Many states have a unique advance directive form. (It may ask you to address specific issues.) Or you might use a universal form that's approved by many states. If your form doesn't tell you what to address, it may be hard to know what to include in your advance directive. Use the questions below to help you get started. Who do you want to make decisions about your medical care if you are not able to? What life-support measures do you want if you have a serious illness that gets worse over time or can't be cured? What are you most afraid of that might happen? (Maybe you're afraid of having pain, losing your independence, or being kept alive by machines.)  Where would you prefer to die? (Your home? A hospital? A nursing home?)  Do you want to donate your organs when you die? Do you want certain Mu-ism practices performed before you die?   When should you call for help? Be sure to contact your doctor if you have any questions. Where can you learn more? Go to http://www.nolen.com/ and enter R264 to learn more about \"Advance Directives: Care Instructions. \"  Current as of: June 16, 2022               Content Version: 13.5  © 1827-2260 [employer]. Care instructions adapted under license by [facility] ([facility]). If you have questions about a medical condition or this instruction, always ask your healthcare professional. [address] any warranty or liability for your use of this information. A Healthy Heart: Care Instructions  Your Care Instructions     Coronary artery disease, also called heart disease, occurs when a substance called plaque builds up in the vessels that supply oxygen-rich blood to your heart muscle. This can narrow the blood vessels and reduce blood flow. A heart attack happens when blood flow is completely blocked. A high-fat diet, smoking, and other factors increase the risk of heart disease. Your doctor has found that you have a chance of having heart disease. You can do lots of things to keep your heart healthy. It may not be easy, but you can change your diet, exercise more, and quit smoking. These steps really work to lower your chance of heart disease. Follow-up care is a key part of your treatment and safety. Be sure to make and go to all appointments, and call your doctor if you are having problems. It's also a good idea to know your test results and keep a list of the medicines you take. How can you care for yourself at home? Diet    Use less salt when you cook and eat. This helps lower your blood pressure. Taste food before salting. Add only a little salt when you think you need it.  With time, your taste buds will adjust to less salt.     Eat fewer snack items, fast foods, canned soups, and other high-salt, high-fat, processed foods.     Read food labels and try to avoid saturated and trans fats. They increase your risk of heart disease by raising cholesterol levels.     Limit the amount of solid fat-butter, margarine, and shortening-you eat. Use olive, peanut, or canola oil when you cook. Bake, broil, and steam foods instead of frying them.     Eat a variety of fruit and vegetables every day. Dark green, deep orange, red, or yellow fruits and vegetables are especially good for you. Examples include spinach, carrots, peaches, and berries.     Foods high in fiber can reduce your cholesterol and provide important vitamins and minerals. High-fiber foods include whole-grain cereals and breads, oatmeal, beans, brown rice, citrus fruits, and apples.     Eat lean proteins. Heart-healthy proteins include seafood, lean meats and poultry, eggs, beans, peas, nuts, seeds, and soy products.     Limit drinks and foods with added sugar. These include candy, desserts, and soda pop. Lifestyle changes    If your doctor recommends it, get more exercise. Walking is a good choice. Bit by bit, increase the amount you walk every day. Try for at least 30 minutes on most days of the week. You also may want to swim, bike, or do other activities.     Do not smoke. If you need help quitting, talk to your doctor about stop-smoking programs and medicines. These can increase your chances of quitting for good. Quitting smoking may be the most important step you can take to protect your heart. It is never too late to quit.     Limit alcohol to 2 drinks a day for men and 1 drink a day for women. Too much alcohol can cause health problems.     Manage other health problems such as diabetes, high blood pressure, and high cholesterol. If you think you may have a problem with alcohol or drug use, talk to your doctor. Medicines    Take your medicines exactly as prescribed.  Call your doctor if you think you are having a problem with your medicine.     If your doctor recommends aspirin, take the amount directed each day. Make sure you take aspirin and not another kind of pain reliever, such as acetaminophen (Tylenol). When should you call for help? Call 911 if you have symptoms of a heart attack. These may include:    Chest pain or pressure, or a strange feeling in the chest.     Sweating.     Shortness of breath.     Pain, pressure, or a strange feeling in the back, neck, jaw, or upper belly or in one or both shoulders or arms.     Lightheadedness or sudden weakness.     A fast or irregular heartbeat. After you call 911, the  may tell you to chew 1 adult-strength or 2 to 4 low-dose aspirin. Wait for an ambulance. Do not try to drive yourself. Watch closely for changes in your health, and be sure to contact your doctor if you have any problems. Where can you learn more? Go to http://www.nolen.com/ and enter F075 to learn more about \"A Healthy Heart: Care Instructions. \"  Current as of: September 7, 2022               Content Version: 13.5  © 3593-3016 [employer]. Care instructions adapted under license by [facility] ([facility]). If you have questions about a medical condition or this instruction, always ask your healthcare professional. [place] [address] any warranty or liability for your use of this information. Personalized Preventive Plan for Lesli Alvarez - 2/2/2023  Medicare offers a range of preventive health benefits. Some of the tests and screenings are paid in full while other may be subject to a deductible, co-insurance, and/or copay. Some of these benefits include a comprehensive review of your medical history including lifestyle, illnesses that may run in your family, and various assessments and screenings as appropriate. After reviewing your medical record and screening and assessments performed today your provider may have ordered immunizations, labs, imaging, and/or referrals for you.   A list of these orders (if applicable) as well as your Preventive Care list are included within your After Visit Summary for your review. Other Preventive Recommendations:    A preventive eye exam performed by an eye specialist is recommended every 1-2 years to screen for glaucoma; cataracts, macular degeneration, and other eye disorders. A preventive dental visit is recommended every 6 months. Try to get at least 150 minutes of exercise per week or 10,000 steps per day on a pedometer . Order or download the FREE \"Exercise & Physical Activity: Your Everyday Guide\" from [employer]. Call 6-285.100.2170 or search [employer] online. You need 9447-5646 mg of calcium and 1655-3903 IU of vitamin D per day. It is possible to meet your calcium requirement with diet alone, but a vitamin D supplement is usually necessary to meet this goal.  When exposed to the sun, use a sunscreen that protects against both UVA and UVB radiation with an SPF of 30 or greater. Reapply every 2 to 3 hours or after sweating, drying off with a towel, or swimming. Always wear a seat belt when traveling in a car. Always wear a helmet when riding a bicycle or motorcycle.

## 2023-02-02 NOTE — PROGRESS NOTES
Covid booster eligible as well as shingles  Utd on pneumovax/ flu shot  Labs good on 8/4/22 - gfr 42, ca 10.7Medicare Annual Wellness Visit    Emerson Valdez is here for Medicare AWV    Assessment & Plan       Diagnosis Orders   1. Medicare annual wellness visit, subsequent        2. Arthritis        3. Memory change        4. Atrial fibrillation, unspecified type ([facility] [address]        5. Acquired hypothyroidism        6. Hypercholesteremia        7. History of cancer of gallbladder        8. Mild anemia        9. Chronic combined systolic and diastolic congestive heart failure ([address]        10. Chronic rhinitis  ipratropium (ATROVENT) 0.06 % nasal spray      11. Pressure ulcer of other site, stage 3 ([facility] [address]        12. Stage 3 chronic kidney disease, unspecified whether stage 3a or 3b CKD ([facility] [address]        13. Seborrheic keratoses          Doing well overall -   Declines vaccines  Covid booster eligible as well as shingles  Utd on pneumovax/ flu shot  Labs good on 8/4/22 - gfr 42, ca 10.7  Good hydration encouraged  Brace for leg dw/ pt  Atrovent ns for pnd/ rhinorrhea - consider not eating before bed ? LPRD  Reassure aki K's - hold on tx unless bothersome        Recommendations for Preventive Services Due: see orders and patient instructions/AVS.  Recommended screening schedule for the next 5-10 years is provided to the patient in written form: see Patient Instructions/AVS.          Subjective   Wearing aids all the time - not great hearing - no change in vision  No falls - steady on walker - right knee turning in - no pain/ gait instability from this - wearing thin brace  Check spots on shoulder/ neck  Bad coughing spells sukhwinder before going to bed - ? Sinus related  But really bad at times gags at times spitting up mucus -used mucinex/ ricola. Not as bad last few weeks.   Always at bedtime - eats ice cream at bed times - no sob/ wheezing  Had pacer #2 placed 5-6 years ago  Eating well  No cp/palp  Bm's okay/ urination okay  No problems sleep  Memory about the same  Arthritis seems okay - not using ultram very often -no refills needed      BP Readings from Last 3 Encounters:   02/02/23 118/68   08/04/22 118/64   10/15/21 127/79     Pulse Readings from Last 3 Encounters:   02/02/23 80   08/04/22 64   10/15/21 78     Wt Readings from Last 3 Encounters:   02/02/23 166 lb (75.3 kg)   08/04/22 158 lb (71.7 kg)   10/15/21 163 lb 2.3 oz (74 kg)         Patient's complete Health Risk Assessment and screening values have been reviewed and are found in Flowsheets. The following problems were reviewed today and where indicated follow up appointments were made and/or referrals ordered. Positive Risk Factor Screenings with Interventions:    Fall Risk:  Do you feel unsteady or are you worried about falling? : (!) yes  2 or more falls in past year?: no  Fall with injury in past year?: no     Interventions:    Home safety - has walker    Cognitive: Words recalled: 0 Words Recalled   Clock Drawing Test (CDT): (!) Abnormal   Total Score: (!) 0   Total Score Interpretation: Abnormal Mini-Cog      Interventions:  Reduced but no significant change            Opioid Risk: (Low risk score <55) Opioid risk score: 6    Patient is low risk for opioid use disorder or overdose. Last PDMP Shanelle Campos as Reviewed:  Review User Review Instant Review Result   Shivanibry Orellana 9/21/2022  1:51 PM     Reviewed PDMP [1]     Last Controlled Substance Monitoring Documentation      Flowsheet Row Refill from 5/13/2019 in [address] The Prescription Monitoring Report for this patient was reviewed today.  filed at 05/13/2019 0185                Weight and Activity:  Physical Activity: Inactive    Days of Exercise per Week: 0 days    Minutes of Exercise per Session: 0 min     On average, how many days per week do you engage in moderate to strenuous exercise (like a brisk walk)?: 0 days  Have you lost any weight without trying in the past 3 months?: No  Body mass index: (!) 27.62      Inactivity Interventions:  Limited w/ oa      Dentist Screen:  Have you seen the dentist within the past year?: (!) No    Intervention:  Not needed for some time    Hearing Screen:  Do you or your family notice any trouble with your hearing that hasn't been managed with hearing aids?: (!) Yes    Interventions:  Has aids    Vision Screen:  Do you have difficulty driving, watching TV, or doing any of your daily activities because of your eyesight?: No  Have you had an eye exam within the past year?: (!) No  No results found. Interventions:    Vision stable    Safety:  Do all of your stairways have a railing or banister?: (!) No    Interventions:  Home safety     Advanced Directives:  Do you have a Living Will?: (!) No    Intervention:  Looking at dpoa right now                       Objective   Vitals:    02/02/23 1509   BP: 118/68   Site: Left Upper Arm   Position: Sitting   Cuff Size: Medium Adult   Pulse: 80   Resp: 20   SpO2: 99%   Weight: 166 lb (75.3 kg)   Height: 5' 5\" (1.651 m)      Body mass index is 27.62 kg/m². Allergies   Allergen Reactions    Demerol     Dilaudid [Hydromorphone Hcl]     Hydrochlorothiazide     Macrobid [Nitrofurantoin Monohydrate Macrocrystals]     Other      FRESH FROZEN PLASMA- CAUSED SWELLING OF EYES AND MOUTH, HIVES! Sulfa Antibiotics     Tetanus Toxoids      Prior to Visit Medications    Medication Sig Taking?  Authorizing Provider   sotalol (BETAPACE) 80 MG tablet TAKE 1 TABLET BY MOUTH DAILY Yes Fernando Torres MD   losartan (COZAAR) 100 MG tablet Take 1 tablet by mouth daily TAKE ONE TABLET BY MOUTH ONCE NIGHTLY Yes Fernando Torres MD   furosemide (LASIX) 40 MG tablet Take 1 tablet by mouth See Admin Instructions TAKE 1 TABLET BY MOUTH  DAILY AS NEEDED FOR  SWELLING/ WEIGHT GAIN Yes SABA Mejia CNP   rivaroxaban (XARELTO) 15 MG TABS tablet TAKE 1 TABLET BY MOUTH  DAILY WITH BREAKFAST Yes SABA Mejia CNP   traMADol (ULTRAM) 50 MG tablet Take 1 tablet by mouth every 6 hours as needed for Pain for up to 90 days. Yes Andre Crisostomo MD   spironolactone (ALDACTONE) 25 MG tablet TAKE 1 TABLET BY MOUTH  DAILY Yes Andre Crisostomo MD   levothyroxine (SYNTHROID) 75 MCG tablet TAKE 1 TABLET BY MOUTH  DAILY Yes SABA Brown CNP   ipratropium (ATROVENT) 0.06 % nasal spray 1-2 sprays by Each Nostril route every 6 hours as needed for Rhinitis Yes Andre Crisostomo MD   ipratropium (ATROVENT) 0.06 % nasal spray SPRAY ONE TO TWO SPRAYS INTO EACH NOSTRIL THREE TIMES A DAY AS NEEDED FOR RHINITIS Yes SABA Henson CNP   hydrocortisone 2.5 % cream Apply topically 2 times daily. Yes SABA Henson CNP   diclofenac sodium (VOLTAREN) 1 % GEL Apply 4 grams to lower extremity joints and 2 grams to upper extremity joints as needed 4 times/day Yes Karo Brandt MD   gentamicin (GARAMYCIN) 0.1 % cream Apply topically daily. Yes SABA Lea CNP   tiZANidine (ZANAFLEX) 4 MG tablet 1 tab nightly and 1/2 tab during day prn muscle spasm/ tightness Yes Andre Crisostomo MD   omeprazole 20 MG EC tablet Take 20 mg by mouth daily Yes Historical Provider, MD   Multiple Vitamins-Minerals (THERAPEUTIC MULTIVITAMIN-MINERALS) tablet Take 1 tablet by mouth daily. Yes Historical Provider, MD   diphenhydrAMINE (BENADRYL) 25 MG tablet Take 25 mg by mouth nightly as needed.    Yes Historical Provider, MD [place] (Including outside providers/suppliers regularly involved in providing care):   Patient Care Team:  Andre Crisostomo MD as PCP - General (Family Medicine)  Andre Crisostomo MD as PCP - Empaneled Provider     Reviewed and updated this visit:  Tobacco  Allergies  Meds  Med Hx  Surg Hx  Soc Hx  Fam Hx      Nad  lungs cta  Cv fairly regular w/ normal rate  Some femoral anteversion  No effusion/ joint line ttp  Trace ankle swelling       Andre Crisostomo MD

## 2023-04-05 ENCOUNTER — PATIENT MESSAGE (OUTPATIENT)
Dept: FAMILY MEDICINE CLINIC | Age: 88
End: 2023-04-05

## 2023-04-05 NOTE — TELEPHONE ENCOUNTER
From: Lesli Alvarez  To: Dr. Fantasma Rogers: 4/5/2023 4:23 PM EDT  Subject: Urine Test    Lulu:  Was wondering if I could stop by and  a specimen bottle in order for you to do a urinalysis on Elena. We think she may have a UTI, as she is leaking slightly and her urine is sometimes discolored.   Thanks, Zeynep Jimenez

## 2023-04-07 ENCOUNTER — NURSE ONLY (OUTPATIENT)
Dept: FAMILY MEDICINE CLINIC | Age: 88
End: 2023-04-07

## 2023-04-07 DIAGNOSIS — R82.998 DARK URINE: Primary | ICD-10-CM

## 2023-04-07 DIAGNOSIS — R35.0 URINARY FREQUENCY: ICD-10-CM

## 2023-04-07 LAB
BILIRUBIN, POC: NORMAL
BLOOD URINE, POC: NORMAL
CLARITY, POC: NORMAL
COLOR, POC: YELLOW
GLUCOSE URINE, POC: NORMAL
KETONES, POC: NORMAL
LEUKOCYTE EST, POC: NORMAL
NITRITE, POC: NORMAL
PH, POC: 6.5
PROTEIN, POC: NORMAL
SPECIFIC GRAVITY, POC: 1.02
UROBILINOGEN, POC: 0.2

## 2023-04-19 RX ORDER — LOSARTAN POTASSIUM 100 MG/1
TABLET ORAL
Qty: 90 TABLET | Refills: 0 | Status: SHIPPED | OUTPATIENT
Start: 2023-04-19

## 2023-06-19 RX ORDER — SPIRONOLACTONE 25 MG/1
25 TABLET ORAL DAILY
Qty: 90 TABLET | Refills: 1 | Status: SHIPPED | OUTPATIENT
Start: 2023-06-19

## 2023-07-10 RX ORDER — SOTALOL HYDROCHLORIDE 80 MG/1
TABLET ORAL
Qty: 90 TABLET | Refills: 0 | Status: SHIPPED | OUTPATIENT
Start: 2023-07-10

## 2023-07-27 RX ORDER — LOSARTAN POTASSIUM 100 MG/1
TABLET ORAL
Qty: 90 TABLET | Refills: 0 | Status: SHIPPED | OUTPATIENT
Start: 2023-07-27

## 2023-08-10 RX ORDER — LEVOTHYROXINE SODIUM 0.07 MG/1
TABLET ORAL
Qty: 90 TABLET | Refills: 0 | Status: SHIPPED | OUTPATIENT
Start: 2023-08-10

## 2023-10-10 RX ORDER — FUROSEMIDE 40 MG/1
TABLET ORAL
Qty: 90 TABLET | Refills: 3 | Status: SHIPPED | OUTPATIENT
Start: 2023-10-10

## 2023-10-11 RX ORDER — LEVOTHYROXINE SODIUM 0.07 MG/1
TABLET ORAL
Qty: 90 TABLET | Refills: 3 | Status: SHIPPED | OUTPATIENT
Start: 2023-10-11

## 2023-10-23 RX ORDER — SOTALOL HYDROCHLORIDE 80 MG/1
TABLET ORAL
Qty: 60 TABLET | Refills: 3 | Status: SHIPPED | OUTPATIENT
Start: 2023-10-23

## 2023-10-26 ENCOUNTER — TELEPHONE (OUTPATIENT)
Dept: FAMILY MEDICINE CLINIC | Age: 88
End: 2023-10-26

## 2023-10-26 RX ORDER — LOSARTAN POTASSIUM 100 MG/1
100 TABLET ORAL DAILY
Qty: 90 TABLET | Refills: 0 | Status: SHIPPED | OUTPATIENT
Start: 2023-10-26

## 2023-12-08 ENCOUNTER — TELEPHONE (OUTPATIENT)
Dept: FAMILY MEDICINE CLINIC | Age: 88
End: 2023-12-08

## 2023-12-08 DIAGNOSIS — J01.90 ACUTE BACTERIAL SINUSITIS: Primary | ICD-10-CM

## 2023-12-08 DIAGNOSIS — B96.89 ACUTE BACTERIAL SINUSITIS: Primary | ICD-10-CM

## 2023-12-08 NOTE — TELEPHONE ENCOUNTER
LVM FOR PATIENT SON TO SEE IF WE CAN GET PATIENT IN HERE FOR TESTING PER FRANCESCA, OTHERWISE IF PATIENT CALLS BACK SEE IF THEY CAN GET A HOME COVID TEST AND TEST OVER THE WEEKEND, FRANCESCA IS ON CALL AND THEY COULD CALL HIM WITH THE RESULTS.  SC

## 2023-12-08 NOTE — TELEPHONE ENCOUNTER
Patients son in law was calling to get a medication prescription because patient has been sick.      PT C/O NASAL DRAINAGE: yes   WHAT COLOR: yellow   COUGH: yes   BRINGING UP PHLEGM:  yes   IF YES, WHAT COLOR:  yellow   SORE THROAT:  yes  PND:  yes  HEADACHE:  yes  EAR PAIN:  no  LEFT, RIGHT OR BOTH:    S.O.B.:  no  WITH OR WITHOUT EXERTION:    WHEEZING:  no  HEAD CONGESTION:  yes  CHEST CONGESTION:  yes  BODY ACHES:  yes  VOMITTING no  DIARRHEA:  no  TEMP:  yes, slight fever   IF SO, HIGHEST TEMP: her temp now  99.3  SYMPTOMS FOR HOW MANY DAYS:  Started Wednesday Evening but got really bad last night     Has not taken anything other than Mucin ex       Please give them a call     [address]   Phone Number [phone]

## 2023-12-11 RX ORDER — AMOXICILLIN AND CLAVULANATE POTASSIUM 875; 125 MG/1; MG/1
1 TABLET, FILM COATED ORAL 2 TIMES DAILY
Qty: 14 TABLET | Refills: 0 | Status: SHIPPED | OUTPATIENT
Start: 2023-12-11 | End: 2023-12-18

## 2023-12-11 NOTE — TELEPHONE ENCOUNTER
Pt's son in law called again said that pt tested negative for Covid but her cough is really bad. Is asking that we call something in for her.

## 2024-01-08 DIAGNOSIS — M19.90 ARTHRITIS: ICD-10-CM

## 2024-01-08 RX ORDER — TRAMADOL HYDROCHLORIDE 50 MG/1
50 TABLET ORAL EVERY 6 HOURS PRN
Qty: 120 TABLET | Refills: 0 | Status: SHIPPED | OUTPATIENT
Start: 2024-01-08 | End: 2024-04-07

## 2024-01-08 NOTE — TELEPHONE ENCOUNTER
Patient needs a refill on her medication tramadol 50 mg tablet - 3 per day, but she only takes as needed.     [facility] - [address] [place] [address] - phone  no. [phone]    Please give him a call back.

## 2024-01-27 RX ORDER — LOSARTAN POTASSIUM 100 MG/1
100 TABLET ORAL DAILY
Qty: 90 TABLET | Refills: 0 | Status: CANCELLED | OUTPATIENT
Start: 2024-01-27

## 2024-01-29 ENCOUNTER — TELEPHONE (OUTPATIENT)
Dept: FAMILY MEDICINE CLINIC | Age: 89
End: 2024-01-29

## 2024-01-29 RX ORDER — LOSARTAN POTASSIUM 100 MG/1
100 TABLET ORAL DAILY
COMMUNITY
End: 2024-01-29 | Stop reason: SDUPTHER

## 2024-01-29 RX ORDER — LOSARTAN POTASSIUM 100 MG/1
100 TABLET ORAL DAILY
Qty: 90 TABLET | Refills: 0 | Status: SHIPPED | OUTPATIENT
Start: 2024-01-29

## 2024-01-29 NOTE — TELEPHONE ENCOUNTER
There has not been sufficient evidence showing that this supplement can help with memory.  If she would like to try any supplements that have been shown that they can help, she should try vitamin D and vitamin B12 if she is not already.

## 2024-01-29 NOTE — TELEPHONE ENCOUNTER
Pt needs to know if prevegan is any good to help improve memory function.  If so will that interact with any of the pt's current medications.  Please call [phone] (Andrews Gloria)

## 2024-02-22 ENCOUNTER — TELEPHONE (OUTPATIENT)
Dept: FAMILY MEDICINE CLINIC | Age: 89
End: 2024-02-22

## 2024-02-22 ENCOUNTER — OFFICE VISIT (OUTPATIENT)
Dept: FAMILY MEDICINE CLINIC | Age: 89
End: 2024-02-22

## 2024-02-22 VITALS
BODY MASS INDEX: 27.62 KG/M2 | DIASTOLIC BLOOD PRESSURE: 84 MMHG | OXYGEN SATURATION: 98 % | SYSTOLIC BLOOD PRESSURE: 130 MMHG | HEART RATE: 61 BPM | HEIGHT: 65 IN

## 2024-02-22 DIAGNOSIS — I50.42 CHRONIC COMBINED SYSTOLIC AND DIASTOLIC CONGESTIVE HEART FAILURE (HCC): ICD-10-CM

## 2024-02-22 DIAGNOSIS — L89.893 PRESSURE ULCER OF OTHER SITE, STAGE 3 (HCC): ICD-10-CM

## 2024-02-22 DIAGNOSIS — I48.91 ATRIAL FIBRILLATION, UNSPECIFIED TYPE (HCC): ICD-10-CM

## 2024-02-22 DIAGNOSIS — R40.4 ALTERED CONSCIOUSNESS: Primary | ICD-10-CM

## 2024-02-22 DIAGNOSIS — N18.30 STAGE 3 CHRONIC KIDNEY DISEASE, UNSPECIFIED WHETHER STAGE 3A OR 3B CKD (HCC): ICD-10-CM

## 2024-02-22 SDOH — ECONOMIC STABILITY: INCOME INSECURITY: HOW HARD IS IT FOR YOU TO PAY FOR THE VERY BASICS LIKE FOOD, HOUSING, MEDICAL CARE, AND HEATING?: NOT HARD AT ALL

## 2024-02-22 SDOH — ECONOMIC STABILITY: FOOD INSECURITY: WITHIN THE PAST 12 MONTHS, YOU WORRIED THAT YOUR FOOD WOULD RUN OUT BEFORE YOU GOT MONEY TO BUY MORE.: NEVER TRUE

## 2024-02-22 SDOH — ECONOMIC STABILITY: FOOD INSECURITY: WITHIN THE PAST 12 MONTHS, THE FOOD YOU BOUGHT JUST DIDN'T LAST AND YOU DIDN'T HAVE MONEY TO GET MORE.: NEVER TRUE

## 2024-02-22 ASSESSMENT — PATIENT HEALTH QUESTIONNAIRE - PHQ9
2. FEELING DOWN, DEPRESSED OR HOPELESS: 0
SUM OF ALL RESPONSES TO PHQ9 QUESTIONS 1 & 2: 0
SUM OF ALL RESPONSES TO PHQ QUESTIONS 1-9: 0
1. LITTLE INTEREST OR PLEASURE IN DOING THINGS: 0

## 2024-02-22 ASSESSMENT — ENCOUNTER SYMPTOMS
WHEEZING: 0
EYE PAIN: 0
PHOTOPHOBIA: 0
EYE REDNESS: 0
COUGH: 0
EYE DISCHARGE: 0
EYE ITCHING: 0
SHORTNESS OF BREATH: 0

## 2024-02-22 NOTE — PATIENT INSTRUCTIONS
You may receive a survey regarding the care you received during your visit.  Your input is valuable to us.  We encourage you to complete and return your survey.  We hope you will choose us in the future for your healthcare needs. GENERAL OFFICE POLICIES      Telephone Calls: Messages will be answered within 1-2 business days, unless the provider is out of the office.  If it is urgent a covering provider will answer. (this does not include Medication refills).    MyChart:  We recommend all patients sign up for [facility].  Through this portal you can see your lab results, request refills, schedule appointments, pay your bill and send messages to the office.   [employer]ARQhart messages will be answered within 1-2 business days unless the provider is out of the office.  For urgent matters, please call the office.  Appointments:  All appointments must be scheduled.  We ask all patients to schedule their next follow up appointment before they leave the office to make sure you will be able to be seen before you run out of medications.  24 hours notice is required to cancel or reschedule an appointment to avoid being marked as a no show.  You may be dismissed from the practice after 3 no shows.    LATE for Appointment: If you are 15 or more minutes late for your appointment, you may be asked to reschedule.  MA/LAB APPTS: Must be scheduled, cannot accept walk in lab visits.  We only draw labs for patients established in our office.  We only do injections for medications ordered by our office.  Acute Sick Visits:  Nothing other than acute complaint will be addressed at this visit.  TRADITIONAL MEDICARE  DOES NOT COVER PHYSICALS  MEDICARE WELLNESS VISITS: These are NOT physicals but the free annual visit offered by Medicare to discuss wellness issues. Medication refills, checkups, etc. will not be addressed during this visit.  Medication Refills: Refills are handled electronically so please contact your pharmacy for medication refills

## 2024-02-22 NOTE — PROGRESS NOTES
Elena Dickerson (:  3/31/1927) is a 89 y.o. female,Established patient, here for evaluation of the following chief complaint(s):  Other (POSSIBLE TIA)      ASSESSMENT/PLAN:  1. Altered consciousness  -TIA versus seizure versus hypoglycemia versus hypertension versus intracranial hemorrhage versus dementia.  Given that the patient does take Xarelto and has been having some residual symptoms, will order CTA of the head and neck with contrast as well as a CT of the head without contrast.  The patient does have a history of chronic kidney disease, but the benefit of this test outweighs the risk given the episode.  Encouraged increased hydration following the CT scan.  Given that this could be representative of a hypoglycemic episode, will send for glucose monitoring supplies in case episode were to recur.  This has been an isolated incident, so can monitor for now.  No additional intervention pending results of imaging.  -     blood glucose monitor kit and supplies; Dispense sufficient amount for indicated testing frequency plus additional to accommodate PRN testing needs. Dispense all needed supplies to include: monitor, strips, lancing device, lancets, control solutions, alcohol swabs., Disp-1 kit, R-0, Normal  -     CTA HEAD NECK W WO CONTRAST; Future  -     CT HEAD WO CONTRAST; Future  2. Pressure ulcer of other site, stage 3 (HCC)  -Resolved.  No additional intervention needed.  3. Chronic combined systolic and diastolic congestive heart failure (HCC)  -Stable.  Continues to follow with cardiology.  Continue with management by them.  4. Atrial fibrillation, unspecified type (HCC)  -Persistent A-fib.  Continue Xarelto.  Continue sotalol.  No changes today.  5. Stage 3 chronic kidney disease, unspecified whether stage 3a or 3b CKD (HCC)  -Noted most recent GFR.  Given recent TIA type symptoms, CTA of the head and neck with contrast benefits outweigh risk.  Encouraged increased hydration following the scan.

## 2024-02-22 NOTE — TELEPHONE ENCOUNTER
As needed for altered mental status/evaluate for hypoglycemia. If this isn't good enough, just say daily PRN.

## 2024-02-23 ENCOUNTER — TELEPHONE (OUTPATIENT)
Dept: FAMILY MEDICINE CLINIC | Age: 89
End: 2024-02-23

## 2024-02-23 DIAGNOSIS — R40.4 ALTERED CONSCIOUSNESS: Primary | ICD-10-CM

## 2024-02-23 NOTE — TELEPHONE ENCOUNTER
Lulú with  pre services is calling to get pt CTA with and with out contrast for her Head and Neck.    It needs to be with contrast for her head  and neck.  Do not use with and without.    Just place in Epic

## 2024-02-23 NOTE — TELEPHONE ENCOUNTER
I can't find CTA head neck w contrast, so I did cta head w contrast and cta neck w contrast. Hopefully this will suffice.

## 2024-03-11 ENCOUNTER — HOSPITAL ENCOUNTER (OUTPATIENT)
Dept: CT IMAGING | Age: 89
Discharge: HOME | End: 2024-03-11
Payer: MEDICARE

## 2024-03-11 DIAGNOSIS — R40.4 ALTERED CONSCIOUSNESS: ICD-10-CM

## 2024-03-11 LAB
CREAT SERPL-MCNC: 0.9 MG/DL (ref 0.6–1.2)
GFR SERPLBLD CREATININE-BSD FMLA CKD-EPI: 58 ML/MIN/{1.73_M2}

## 2024-03-11 PROCEDURE — 6360000004 HC RX CONTRAST MEDICATION: Performed by: NURSE PRACTITIONER

## 2024-03-11 PROCEDURE — 70450 CT HEAD/BRAIN W/O DYE: CPT

## 2024-03-11 PROCEDURE — 70496 CT ANGIOGRAPHY HEAD: CPT

## 2024-03-11 PROCEDURE — 82565 ASSAY OF CREATININE: CPT

## 2024-03-11 PROCEDURE — 36415 COLL VENOUS BLD VENIPUNCTURE: CPT

## 2024-03-11 RX ADMIN — IOPAMIDOL 75 ML: 755 INJECTION, SOLUTION INTRAVENOUS at 17:01

## 2024-03-18 RX ORDER — SPIRONOLACTONE 25 MG/1
25 TABLET ORAL DAILY
Qty: 90 TABLET | Refills: 3 | Status: SHIPPED | OUTPATIENT
Start: 2024-03-18

## 2024-04-09 NOTE — PATIENT INSTRUCTIONS
carrots, peaches, and berries.     Foods high in fiber can reduce your cholesterol and provide important vitamins and minerals. High-fiber foods include whole-grain cereals and breads, oatmeal, beans, brown rice, citrus fruits, and apples.     Eat lean proteins. Heart-healthy proteins include seafood, lean meats and poultry, eggs, beans, peas, nuts, seeds, and soy products.     Limit drinks and foods with added sugar. These include candy, desserts, and soda pop.   Heart-healthy lifestyle    If your doctor recommends it, get more exercise. For many people, walking is a good choice. Or you may want to swim, bike, or do other activities. Bit by bit, increase the time you're active every day. Try for at least 30 minutes on most days of the week.     Try to quit or cut back on using tobacco and other nicotine products. This includes smoking and vaping. If you need help quitting, talk to your doctor about stop-smoking programs and medicines. These can increase your chances of quitting for good. Quitting is one of the most important things you can do to protect your heart. It is never too late to quit. Try to avoid secondhand smoke too.     Stay at a weight that's healthy for you. Talk to your doctor if you need help losing weight.     Try to get 7 to 9 hours of sleep each night.     Limit alcohol to 2 drinks a day for men and 1 drink a day for women. Too much alcohol can cause health problems.     Manage other health problems such as diabetes, high blood pressure, and high cholesterol. If you think you may have a problem with alcohol or drug use, talk to your doctor.   Medicines    Take your medicines exactly as prescribed. Call your doctor if you think you are having a problem with your medicine.     If your doctor recommends aspirin, take the amount directed each day. Make sure you take aspirin and not another kind of pain reliever, such as acetaminophen (Tylenol).   When should you call for help?   Call 911 if you have

## 2024-04-10 ENCOUNTER — OFFICE VISIT (OUTPATIENT)
Dept: FAMILY MEDICINE CLINIC | Age: 89
End: 2024-04-10

## 2024-04-10 VITALS
BODY MASS INDEX: 27.62 KG/M2 | HEIGHT: 65 IN | SYSTOLIC BLOOD PRESSURE: 114 MMHG | DIASTOLIC BLOOD PRESSURE: 60 MMHG | HEART RATE: 62 BPM | OXYGEN SATURATION: 98 %

## 2024-04-10 DIAGNOSIS — I50.42 CHRONIC COMBINED SYSTOLIC AND DIASTOLIC CONGESTIVE HEART FAILURE (HCC): ICD-10-CM

## 2024-04-10 DIAGNOSIS — Z00.00 MEDICARE ANNUAL WELLNESS VISIT, SUBSEQUENT: Primary | ICD-10-CM

## 2024-04-10 DIAGNOSIS — E03.9 ACQUIRED HYPOTHYROIDISM: ICD-10-CM

## 2024-04-10 DIAGNOSIS — Z86.73 HISTORY OF STROKE: ICD-10-CM

## 2024-04-10 DIAGNOSIS — N18.30 STAGE 3 CHRONIC KIDNEY DISEASE, UNSPECIFIED WHETHER STAGE 3A OR 3B CKD (HCC): ICD-10-CM

## 2024-04-10 DIAGNOSIS — I48.20 ATRIAL FIBRILLATION, CHRONIC (HCC): ICD-10-CM

## 2024-04-10 DIAGNOSIS — E83.52 HYPERCALCEMIA: ICD-10-CM

## 2024-04-10 DIAGNOSIS — D68.69 SECONDARY HYPERCOAGULABLE STATE (HCC): ICD-10-CM

## 2024-04-10 DIAGNOSIS — M25.511 ACUTE PAIN OF RIGHT SHOULDER: ICD-10-CM

## 2024-04-10 RX ORDER — LEVOTHYROXINE SODIUM 0.07 MG/1
75 TABLET ORAL DAILY
Qty: 90 TABLET | Refills: 3 | Status: SHIPPED | OUTPATIENT
Start: 2024-04-10

## 2024-04-10 RX ORDER — SOTALOL HYDROCHLORIDE 80 MG/1
TABLET ORAL
Qty: 90 TABLET | Refills: 3 | Status: SHIPPED | OUTPATIENT
Start: 2024-04-10

## 2024-04-10 RX ORDER — FUROSEMIDE 40 MG/1
TABLET ORAL
Qty: 90 TABLET | Refills: 3 | Status: SHIPPED | OUTPATIENT
Start: 2024-04-10

## 2024-04-10 RX ORDER — LOSARTAN POTASSIUM 100 MG/1
100 TABLET ORAL DAILY
Qty: 90 TABLET | Refills: 0 | Status: SHIPPED | OUTPATIENT
Start: 2024-04-10

## 2024-04-10 ASSESSMENT — LIFESTYLE VARIABLES
HOW OFTEN DO YOU HAVE A DRINK CONTAINING ALCOHOL: NEVER
HOW MANY STANDARD DRINKS CONTAINING ALCOHOL DO YOU HAVE ON A TYPICAL DAY: PATIENT DOES NOT DRINK

## 2024-04-10 ASSESSMENT — PATIENT HEALTH QUESTIONNAIRE - PHQ9
SUM OF ALL RESPONSES TO PHQ QUESTIONS 1-9: 2
1. LITTLE INTEREST OR PLEASURE IN DOING THINGS: SEVERAL DAYS
SUM OF ALL RESPONSES TO PHQ QUESTIONS 1-9: 2
SUM OF ALL RESPONSES TO PHQ QUESTIONS 1-9: 2
SUM OF ALL RESPONSES TO PHQ9 QUESTIONS 1 & 2: 2
2. FEELING DOWN, DEPRESSED OR HOPELESS: SEVERAL DAYS
SUM OF ALL RESPONSES TO PHQ QUESTIONS 1-9: 2

## 2024-04-10 NOTE — PROGRESS NOTES
PDMP [1]     Last Controlled Substance Monitoring Documentation      Flowsheet Row Office Visit from 4/10/2024 in [facility]   Periodic Controlled Substance Monitoring Possible medication side effects, risk of tolerance/dependence & alternative treatments discussed., No signs of potential drug abuse or diversion identified., Assessed functional status (ability to engage in work or other purposeful activities, the pain intensity and its interference with activities of daily living, quality of family life and social activities, and the physical activity), Obtaining appropriate analgesic effect of treatment. filed at 04/10/2024 1734   All MEDD Reviewed of previous treatment and response to treatment, patients adherence to medication and non-medication treatment, Discussed with pt. or guardian: Benefits and risks of the medication, including potential for addiction & risk of overdose and responsibility to safely store & appropriately dispose of the medication, Benefits and risks of the medication, including potential for addiction & risk of overdose and responsibility to safely store & appropriately dispose of the medication filed at 04/10/2024 1734               Activity, Diet, and Weight:  On average, how many days per week do you engage in moderate to strenuous exercise (like a brisk walk)?: 0 days  On average, how many minutes do you engage in exercise at this level?: 0 min    Do you eat balanced/healthy meals regularly?: Yes    Body mass index is 27.62 kg/m².      Inactivity Interventions:  Recommendations: patient agrees to exercise for at least 150 minutes/week        Dentist Screen:  Have you seen the dentist within the past year?: (!) No    Intervention:  Encourage to schedule with dentist     Vision Screen:  Do you have difficulty driving, watching TV, or doing any of your daily activities because of your eyesight?: No  Have you had an eye exam within the past year?: (!) No  No results

## 2024-04-24 NOTE — PATIENT INSTRUCTIONS

## 2024-04-25 ENCOUNTER — OFFICE VISIT (OUTPATIENT)
Dept: FAMILY MEDICINE CLINIC | Age: 89
End: 2024-04-25
Payer: MEDICARE

## 2024-04-25 VITALS — SYSTOLIC BLOOD PRESSURE: 114 MMHG | DIASTOLIC BLOOD PRESSURE: 72 MMHG

## 2024-04-25 DIAGNOSIS — H61.23 HEARING LOSS DUE TO CERUMEN IMPACTION, BILATERAL: Primary | ICD-10-CM

## 2024-04-25 PROCEDURE — 99213 OFFICE O/P EST LOW 20 MIN: CPT | Performed by: FAMILY MEDICINE

## 2024-04-25 PROCEDURE — 1123F ACP DISCUSS/DSCN MKR DOCD: CPT | Performed by: FAMILY MEDICINE

## 2024-04-25 NOTE — PROGRESS NOTES
Systems    Objective:  /72 (Site: Left Upper Arm, Position: Sitting, Cuff Size: Medium Adult)     BP Readings from Last 3 Encounters:   04/25/24 114/72   04/10/24 114/60   02/22/24 130/84       Pulse Readings from Last 3 Encounters:   04/10/24 62   02/22/24 61   02/02/23 80       Wt Readings from Last 3 Encounters:   02/02/23 75.3 kg (166 lb)   08/04/22 71.7 kg (158 lb)   10/15/21 74 kg (163 lb 2.3 oz)       Physical Exam  Vitals and nursing note reviewed.   Constitutional:       Appearance: She is well-developed.   HENT:      Head: Normocephalic and atraumatic.      Ears:      Comments: Canals occluded w/ dark dry wax bilaterally  Eyes:      General: No scleral icterus.     Conjunctiva/sclera: Conjunctivae normal.   Pulmonary:      Effort: Pulmonary effort is normal.   Skin:     General: Skin is warm and dry.   Neurological:      Mental Status: She is alert and oriented to person, place, and time.         Assessment/Plan:      Diagnosis Orders   1. Hearing loss due to cerumen impaction, bilateral          Generally stable  Utd on audiology maintenance  Doing well overall  Had   recent awv - no concerns presently  Both ears irrigated and wax removed - pt tolerated well  F/u prn           Franki Jin MD, MD  4/25/2024  11:34 AM

## 2024-05-03 ENCOUNTER — TELEPHONE (OUTPATIENT)
Dept: FAMILY MEDICINE CLINIC | Age: 89
End: 2024-05-03

## 2024-05-03 DIAGNOSIS — J31.0 CHRONIC RHINITIS: ICD-10-CM

## 2024-05-03 RX ORDER — IPRATROPIUM BROMIDE 42 UG/1
2 SPRAY, METERED NASAL 4 TIMES DAILY
Qty: 15 ML | Refills: 3 | Status: SHIPPED | OUTPATIENT
Start: 2024-05-03

## 2024-06-11 DIAGNOSIS — N18.30 STAGE 3 CHRONIC KIDNEY DISEASE, UNSPECIFIED WHETHER STAGE 3A OR 3B CKD (HCC): ICD-10-CM

## 2024-06-11 RX ORDER — LOSARTAN POTASSIUM 100 MG/1
100 TABLET ORAL DAILY
Qty: 90 TABLET | Refills: 1 | Status: SHIPPED | OUTPATIENT
Start: 2024-06-11

## 2024-07-19 DIAGNOSIS — I48.20 ATRIAL FIBRILLATION, CHRONIC (HCC): ICD-10-CM

## 2024-07-19 DIAGNOSIS — D68.69 SECONDARY HYPERCOAGULABLE STATE (HCC): ICD-10-CM

## 2024-07-31 DIAGNOSIS — N18.30 STAGE 3 CHRONIC KIDNEY DISEASE, UNSPECIFIED WHETHER STAGE 3A OR 3B CKD (HCC): ICD-10-CM

## 2024-07-31 RX ORDER — LOSARTAN POTASSIUM 100 MG/1
100 TABLET ORAL DAILY
Qty: 90 TABLET | Refills: 0 | Status: SHIPPED | OUTPATIENT
Start: 2024-07-31

## 2024-08-13 DIAGNOSIS — M19.90 ARTHRITIS: ICD-10-CM

## 2024-08-13 RX ORDER — TRAMADOL HYDROCHLORIDE 50 MG/1
50 TABLET ORAL EVERY 6 HOURS PRN
Qty: 120 TABLET | Refills: 0 | OUTPATIENT
Start: 2024-08-13 | End: 2024-11-11

## 2024-08-16 ENCOUNTER — TELEPHONE (OUTPATIENT)
Dept: FAMILY MEDICINE CLINIC | Age: 89
End: 2024-08-16

## 2024-08-16 DIAGNOSIS — M19.90 ARTHRITIS: ICD-10-CM

## 2024-08-16 RX ORDER — TRAMADOL HYDROCHLORIDE 50 MG/1
50 TABLET ORAL EVERY 8 HOURS PRN
Qty: 21 TABLET | Refills: 0 | Status: SHIPPED | OUTPATIENT
Start: 2024-08-16 | End: 2024-08-23

## 2024-08-16 NOTE — TELEPHONE ENCOUNTER
CALLED AND SPOKE TO PATIENT SON AND ADVISED ON [facility]ANC[facility] MESSAGE. SON IS AGREEABLE TO SENDING 21 TABLETS AT A TIME. WOULD BE EASIER SINCE IT IS REALLY HARD TO GET HER OUT OF THE HOUSE. PLEASE SEND SCRIPT AND CLOSE MESSAGE. SC

## 2024-08-16 NOTE — TELEPHONE ENCOUNTER
She had not had tramadol refilled since 2022 prior to the refill in January.  If she is using chronically, she is supposed to follow-up every 3 months with [facility] and med contract.  Based upon her last pickup, she averages about 17 tablets a month total.  If we were to prescribe this in 7-day supply instead of sending all at once (21 tablets at once), this would allow for the ability to prescribe the medication while also avoiding the every 3-month follow-up.  See if they would want to do this.

## 2024-08-16 NOTE — TELEPHONE ENCOUNTER
Patient needs a refill on her tramadol and it came across on my chart that she needs a appointment. She is 89 years old and it is hard to get her out of the house and she was wondering if we could do a VV. Please give Lenka  a call back.     She has no new symptoms.     [facility]- [address] [place] [address] - phone no. [phone]

## 2024-08-16 NOTE — TELEPHONE ENCOUNTER
----- Message from Lucie HORTA sent at 8/16/2024  2:41 PM EDT -----  Regarding: [facility] Appointment Request  [facility] Appointment Request    Patient needs appointment for [facility] Appointment Type: New to Provider.    Patient Requested Dates(s):  Next Week-Monday 08/19/2024  Patient Requested Time: Anytime in the afternoon  Provider Name: Shirley Schultz or any available provider from [place]    Reason for Appointment Request: Established Patient - No appointments available during search  --------------------------------------------------------------------------------------------------------------------------    Relationship to Patient: Other Personal Connection: Son-in Law named Andrews    Call Back Information: OK to leave message on voicemail  Preferred Call Back Number: Phone  [phone]        The patient would like to book a new to provider appointment for patient would really like to switch or to have a provider from [facility] but there were no available appointments found during search. Appointment for a medication refill. Thank you.

## 2024-10-01 DIAGNOSIS — N18.30 STAGE 3 CHRONIC KIDNEY DISEASE, UNSPECIFIED WHETHER STAGE 3A OR 3B CKD (HCC): ICD-10-CM

## 2024-10-01 RX ORDER — LOSARTAN POTASSIUM 100 MG/1
100 TABLET ORAL DAILY
Qty: 90 TABLET | Refills: 1 | Status: SHIPPED | OUTPATIENT
Start: 2024-10-01

## 2024-10-28 DIAGNOSIS — N18.30 STAGE 3 CHRONIC KIDNEY DISEASE, UNSPECIFIED WHETHER STAGE 3A OR 3B CKD (HCC): ICD-10-CM

## 2024-10-28 RX ORDER — LOSARTAN POTASSIUM 100 MG/1
100 TABLET ORAL DAILY
Qty: 90 TABLET | Refills: 1 | OUTPATIENT
Start: 2024-10-28

## 2024-11-01 ENCOUNTER — E-VISIT (OUTPATIENT)
Dept: PRIMARY CARE CLINIC | Age: 89
End: 2024-11-01
Payer: MEDICARE

## 2024-11-01 DIAGNOSIS — J31.0 OTHER RHINITIS: Primary | ICD-10-CM

## 2024-11-01 DIAGNOSIS — R05.9 COUGH, UNSPECIFIED TYPE: ICD-10-CM

## 2024-11-01 PROCEDURE — 99422 OL DIG E/M SVC 11-20 MIN: CPT | Performed by: NURSE PRACTITIONER

## 2024-11-01 ASSESSMENT — LIFESTYLE VARIABLES
PACKS_PER_DAY: 1
SMOKING_STATUS: NO, I'M A FORMER SMOKER
SMOKING_YEARS: 20

## 2024-11-01 NOTE — PROGRESS NOTES
Elena Dickerson (3/31/1927) initiated an asynchronous digital communication through [employer].    HPI: per patient questionnaire     Exam: not applicable    Diagnoses and all orders for this visit:  Diagnoses and all orders for this visit:    Other rhinitis    Cough, unspecified type      Sx started last night. She is 89 years old. I do recommend waiting a bit more to see how her symptoms go. Supportive care measures provided. F/u with pcp     Time: EV2 - 11-20 minutes were spent on the digital evaluation and management of this patient. 17 min     Isabella Garcia, APRN - CNP

## 2025-01-02 ENCOUNTER — PATIENT MESSAGE (OUTPATIENT)
Dept: FAMILY MEDICINE CLINIC | Age: 89
End: 2025-01-02

## 2025-01-02 DIAGNOSIS — I48.20 ATRIAL FIBRILLATION, CHRONIC (HCC): ICD-10-CM

## 2025-01-02 DIAGNOSIS — D68.69 SECONDARY HYPERCOAGULABLE STATE (HCC): ICD-10-CM

## 2025-02-11 ENCOUNTER — PATIENT MESSAGE (OUTPATIENT)
Dept: FAMILY MEDICINE CLINIC | Age: 89
End: 2025-02-11

## 2025-02-13 ENCOUNTER — NURSE ONLY (OUTPATIENT)
Dept: FAMILY MEDICINE CLINIC | Age: 89
End: 2025-02-13
Payer: MEDICARE

## 2025-02-13 DIAGNOSIS — Z23 NEED FOR INFLUENZA VACCINATION: Primary | ICD-10-CM

## 2025-02-13 PROCEDURE — 90653 IIV ADJUVANT VACCINE IM: CPT | Performed by: NURSE PRACTITIONER

## 2025-02-13 PROCEDURE — G0008 ADMIN INFLUENZA VIRUS VAC: HCPCS | Performed by: NURSE PRACTITIONER

## 2025-02-13 NOTE — PROGRESS NOTES
Immunizations Administered       Name Date Dose Route    Influenza, FLUAD, (age 65 y+), IM, Trivalent PF, 0.5mL 2/13/2025 0.5 mL Intramuscular    Site: Deltoid- Right    Lot: [id number]    NDC: 34324-672-10

## 2025-02-19 DIAGNOSIS — M19.90 ARTHRITIS: ICD-10-CM

## 2025-02-19 RX ORDER — TRAMADOL HYDROCHLORIDE 50 MG/1
50 TABLET ORAL EVERY 8 HOURS PRN
Qty: 21 TABLET | Refills: 0 | Status: SHIPPED | OUTPATIENT
Start: 2025-02-19 | End: 2025-02-26

## 2025-03-06 ENCOUNTER — E-VISIT (OUTPATIENT)
Dept: FAMILY MEDICINE CLINIC | Age: 89
End: 2025-03-06
Payer: MEDICARE

## 2025-03-06 ENCOUNTER — LAB (OUTPATIENT)
Dept: FAMILY MEDICINE CLINIC | Age: 89
End: 2025-03-06
Payer: MEDICARE

## 2025-03-06 DIAGNOSIS — R39.9 UTI SYMPTOMS: Primary | ICD-10-CM

## 2025-03-06 DIAGNOSIS — R30.0 DYSURIA: Primary | ICD-10-CM

## 2025-03-06 LAB
BILIRUBIN, POC: ABNORMAL
BLOOD URINE, POC: ABNORMAL
CLARITY, POC: CLEAR
COLOR, POC: YELLOW
GLUCOSE URINE, POC: ABNORMAL MG/DL
KETONES, POC: ABNORMAL MG/DL
LEUKOCYTE EST, POC: ABNORMAL
NITRITE, POC: ABNORMAL
PH, POC: 6
PROTEIN, POC: ABNORMAL MG/DL
SPECIFIC GRAVITY, POC: 1.02
UROBILINOGEN, POC: 0.2 MG/DL

## 2025-03-06 PROCEDURE — 99422 OL DIG E/M SVC 11-20 MIN: CPT | Performed by: NURSE PRACTITIONER

## 2025-03-06 PROCEDURE — 81002 URINALYSIS NONAUTO W/O SCOPE: CPT | Performed by: NURSE PRACTITIONER

## 2025-03-06 RX ORDER — CEFUROXIME AXETIL 500 MG/1
500 TABLET ORAL 2 TIMES DAILY
Qty: 14 TABLET | Refills: 0 | Status: SHIPPED | OUTPATIENT
Start: 2025-03-06 | End: 2025-03-13

## 2025-03-08 LAB
BACTERIA UR CULT: ABNORMAL
ORGANISM: ABNORMAL

## 2025-03-10 ENCOUNTER — RESULTS FOLLOW-UP (OUTPATIENT)
Dept: FAMILY MEDICINE CLINIC | Age: 89
End: 2025-03-10

## 2025-04-12 DIAGNOSIS — I48.20 ATRIAL FIBRILLATION, CHRONIC (HCC): ICD-10-CM

## 2025-04-12 DIAGNOSIS — E03.9 ACQUIRED HYPOTHYROIDISM: ICD-10-CM

## 2025-04-12 DIAGNOSIS — N18.30 STAGE 3 CHRONIC KIDNEY DISEASE, UNSPECIFIED WHETHER STAGE 3A OR 3B CKD (HCC): ICD-10-CM

## 2025-04-14 RX ORDER — LOSARTAN POTASSIUM 100 MG/1
100 TABLET ORAL DAILY
Qty: 90 TABLET | Refills: 0 | Status: SHIPPED | OUTPATIENT
Start: 2025-04-14

## 2025-04-14 RX ORDER — SOTALOL HYDROCHLORIDE 80 MG/1
80 TABLET ORAL DAILY
Qty: 90 TABLET | Refills: 0 | Status: SHIPPED | OUTPATIENT
Start: 2025-04-14

## 2025-04-14 RX ORDER — LEVOTHYROXINE SODIUM 75 UG/1
75 TABLET ORAL DAILY
Qty: 90 TABLET | Refills: 0 | Status: SHIPPED | OUTPATIENT
Start: 2025-04-14

## 2025-04-14 RX ORDER — SPIRONOLACTONE 25 MG/1
25 TABLET ORAL DAILY
Qty: 90 TABLET | Refills: 0 | Status: SHIPPED | OUTPATIENT
Start: 2025-04-14

## 2025-06-19 ENCOUNTER — OFFICE VISIT (OUTPATIENT)
Dept: FAMILY MEDICINE CLINIC | Age: 89
End: 2025-06-19

## 2025-06-19 VITALS
SYSTOLIC BLOOD PRESSURE: 120 MMHG | DIASTOLIC BLOOD PRESSURE: 70 MMHG | OXYGEN SATURATION: 96 % | HEIGHT: 65 IN | BODY MASS INDEX: 27.62 KG/M2 | HEART RATE: 94 BPM

## 2025-06-19 DIAGNOSIS — M19.90 ARTHRITIS: ICD-10-CM

## 2025-06-19 DIAGNOSIS — E53.8 B12 DEFICIENCY: ICD-10-CM

## 2025-06-19 DIAGNOSIS — N18.30 STAGE 3 CHRONIC KIDNEY DISEASE, UNSPECIFIED WHETHER STAGE 3A OR 3B CKD (HCC): ICD-10-CM

## 2025-06-19 DIAGNOSIS — Z00.00 MEDICARE ANNUAL WELLNESS VISIT, SUBSEQUENT: Primary | ICD-10-CM

## 2025-06-19 DIAGNOSIS — E55.9 VITAMIN D DEFICIENCY: ICD-10-CM

## 2025-06-19 DIAGNOSIS — I50.42 CHRONIC COMBINED SYSTOLIC AND DIASTOLIC CONGESTIVE HEART FAILURE (HCC): ICD-10-CM

## 2025-06-19 DIAGNOSIS — D68.69 SECONDARY HYPERCOAGULABLE STATE: ICD-10-CM

## 2025-06-19 DIAGNOSIS — E03.9 ACQUIRED HYPOTHYROIDISM: ICD-10-CM

## 2025-06-19 DIAGNOSIS — I48.20 ATRIAL FIBRILLATION, CHRONIC (HCC): ICD-10-CM

## 2025-06-19 PROBLEM — L97.512 CHRONIC ULCER OF RIGHT GREAT TOE WITH FAT LAYER EXPOSED (HCC): Status: RESOLVED | Noted: 2021-02-18 | Resolved: 2025-06-19

## 2025-06-19 LAB
25(OH)D3 SERPL-MCNC: 48.7 NG/ML
ALBUMIN SERPL-MCNC: 3.8 G/DL (ref 3.4–5)
ALBUMIN/GLOB SERPL: 1.7 {RATIO} (ref 1.1–2.2)
ALP SERPL-CCNC: 95 U/L (ref 40–129)
ALT SERPL-CCNC: 11 U/L (ref 10–40)
ANION GAP SERPL CALCULATED.3IONS-SCNC: 14 MMOL/L (ref 3–16)
AST SERPL-CCNC: 22 U/L (ref 15–37)
BASOPHILS # BLD: 0 K/UL (ref 0–0.2)
BASOPHILS NFR BLD: 0.5 %
BILIRUB SERPL-MCNC: 0.6 MG/DL (ref 0–1)
BUN SERPL-MCNC: 27 MG/DL (ref 7–20)
CALCIUM SERPL-MCNC: 10.1 MG/DL (ref 8.3–10.6)
CHLORIDE SERPL-SCNC: 100 MMOL/L (ref 99–110)
CHOLEST SERPL-MCNC: 151 MG/DL (ref 0–199)
CO2 SERPL-SCNC: 26 MMOL/L (ref 21–32)
CREAT SERPL-MCNC: 1.2 MG/DL (ref 0.6–1.2)
DEPRECATED RDW RBC AUTO: 17.2 % (ref 12.4–15.4)
EOSINOPHIL # BLD: 0 K/UL (ref 0–0.6)
EOSINOPHIL NFR BLD: 0.5 %
GFR SERPLBLD CREATININE-BSD FMLA CKD-EPI: 41 ML/MIN/{1.73_M2}
GLUCOSE SERPL-MCNC: 118 MG/DL (ref 70–99)
HCT VFR BLD AUTO: 30.1 % (ref 36–48)
HDLC SERPL-MCNC: 58 MG/DL (ref 40–60)
HGB BLD-MCNC: 9.2 G/DL (ref 12–16)
LDLC SERPL CALC-MCNC: 82 MG/DL
LYMPHOCYTES # BLD: 1.1 K/UL (ref 1–5.1)
LYMPHOCYTES NFR BLD: 11.2 %
MCH RBC QN AUTO: 21.5 PG (ref 26–34)
MCHC RBC AUTO-ENTMCNC: 30.7 G/DL (ref 31–36)
MCV RBC AUTO: 70 FL (ref 80–100)
MONOCYTES # BLD: 1 K/UL (ref 0–1.3)
MONOCYTES NFR BLD: 10.8 %
NEUTROPHILS # BLD: 7.4 K/UL (ref 1.7–7.7)
NEUTROPHILS NFR BLD: 77 %
PLATELET # BLD AUTO: 319 K/UL (ref 135–450)
PMV BLD AUTO: 7.4 FL (ref 5–10.5)
POTASSIUM SERPL-SCNC: 4.4 MMOL/L (ref 3.5–5.1)
PROT SERPL-MCNC: 6.1 G/DL (ref 6.4–8.2)
RBC # BLD AUTO: 4.3 M/UL (ref 4–5.2)
SODIUM SERPL-SCNC: 140 MMOL/L (ref 136–145)
T4 FREE SERPL-MCNC: 1.4 NG/DL (ref 0.9–1.8)
TRIGL SERPL-MCNC: 54 MG/DL (ref 0–150)
TSH SERPL DL<=0.005 MIU/L-ACNC: 0.42 UIU/ML (ref 0.27–4.2)
VIT B12 SERPL-MCNC: 1279 PG/ML (ref 211–911)
VLDLC SERPL CALC-MCNC: 11 MG/DL
WBC # BLD AUTO: 9.7 K/UL (ref 4–11)

## 2025-06-19 RX ORDER — LOSARTAN POTASSIUM 100 MG/1
100 TABLET ORAL DAILY
Qty: 90 TABLET | Refills: 2 | Status: SHIPPED | OUTPATIENT
Start: 2025-06-19

## 2025-06-19 RX ORDER — TRAMADOL HYDROCHLORIDE 50 MG/1
50 TABLET ORAL EVERY 8 HOURS PRN
Qty: 21 TABLET | Refills: 0 | Status: SHIPPED | OUTPATIENT
Start: 2025-06-19 | End: 2025-06-26

## 2025-06-19 RX ORDER — SPIRONOLACTONE 25 MG/1
25 TABLET ORAL DAILY
Qty: 90 TABLET | Refills: 2 | Status: SHIPPED | OUTPATIENT
Start: 2025-06-19

## 2025-06-19 RX ORDER — LEVOTHYROXINE SODIUM 75 UG/1
75 TABLET ORAL DAILY
Qty: 90 TABLET | Refills: 2 | Status: SHIPPED | OUTPATIENT
Start: 2025-06-19

## 2025-06-19 RX ORDER — SOTALOL HYDROCHLORIDE 80 MG/1
80 TABLET ORAL DAILY
Qty: 90 TABLET | Refills: 2 | Status: SHIPPED | OUTPATIENT
Start: 2025-06-19

## 2025-06-19 RX ORDER — FUROSEMIDE 40 MG/1
TABLET ORAL
Qty: 90 TABLET | Refills: 3 | Status: SHIPPED | OUTPATIENT
Start: 2025-06-19

## 2025-06-19 SDOH — ECONOMIC STABILITY: INCOME INSECURITY: IN THE LAST 12 MONTHS, WAS THERE A TIME WHEN YOU WERE NOT ABLE TO PAY THE MORTGAGE OR RENT ON TIME?: NO

## 2025-06-19 SDOH — ECONOMIC STABILITY: FOOD INSECURITY: WITHIN THE PAST 12 MONTHS, THE FOOD YOU BOUGHT JUST DIDN'T LAST AND YOU DIDN'T HAVE MONEY TO GET MORE.: NEVER TRUE

## 2025-06-19 SDOH — ECONOMIC STABILITY: FOOD INSECURITY: WITHIN THE PAST 12 MONTHS, YOU WORRIED THAT YOUR FOOD WOULD RUN OUT BEFORE YOU GOT MONEY TO BUY MORE.: NEVER TRUE

## 2025-06-19 SDOH — HEALTH STABILITY: PHYSICAL HEALTH: ON AVERAGE, HOW MANY DAYS PER WEEK DO YOU ENGAGE IN MODERATE TO STRENUOUS EXERCISE (LIKE A BRISK WALK)?: 0 DAYS

## 2025-06-19 SDOH — HEALTH STABILITY: PHYSICAL HEALTH: ON AVERAGE, HOW MANY MINUTES DO YOU ENGAGE IN EXERCISE AT THIS LEVEL?: 0 MIN

## 2025-06-19 ASSESSMENT — LIFESTYLE VARIABLES
HOW MANY STANDARD DRINKS CONTAINING ALCOHOL DO YOU HAVE ON A TYPICAL DAY: PATIENT DOES NOT DRINK
HOW MANY STANDARD DRINKS CONTAINING ALCOHOL DO YOU HAVE ON A TYPICAL DAY: 0
HOW OFTEN DO YOU HAVE A DRINK CONTAINING ALCOHOL: 1
HOW OFTEN DO YOU HAVE SIX OR MORE DRINKS ON ONE OCCASION: 1
HOW OFTEN DO YOU HAVE A DRINK CONTAINING ALCOHOL: NEVER

## 2025-06-19 ASSESSMENT — PATIENT HEALTH QUESTIONNAIRE - PHQ9
SUM OF ALL RESPONSES TO PHQ QUESTIONS 1-9: 0
1. LITTLE INTEREST OR PLEASURE IN DOING THINGS: NOT AT ALL
SUM OF ALL RESPONSES TO PHQ QUESTIONS 1-9: 0
2. FEELING DOWN, DEPRESSED OR HOPELESS: NOT AT ALL

## 2025-06-19 NOTE — ASSESSMENT & PLAN NOTE
Controlled.  Continue Xarelto.  No bleeding complications at this time.  Follow-up in 6 months, or sooner if needed.    Orders:    rivaroxaban (XARELTO) 15 MG TABS tablet; TAKE 1 TABLET BY MOUTH DAILY  WITH BREAKFAST

## 2025-06-19 NOTE — ASSESSMENT & PLAN NOTE
Controlled.  Irregular in office today.  Continue Xarelto, sotalol, and spironolactone.  Fasting labs today.  Follow-up in 6 months, or sooner if needed.    Orders:    rivaroxaban (XARELTO) 15 MG TABS tablet; TAKE 1 TABLET BY MOUTH DAILY  WITH BREAKFAST    sotalol (BETAPACE) 80 MG tablet; Take 1 tablet by mouth daily    spironolactone (ALDACTONE) 25 MG tablet; Take 1 tablet by mouth daily    CBC with Auto Differential; Future    Lipid Panel; Future

## 2025-06-19 NOTE — ASSESSMENT & PLAN NOTE
Controlled.  Continue Tylenol on rare tramadol use.  OARRS reviewed.  Typically gets two 7-day fills per year.  Follow-up in 6 months.    Orders:    traMADol (ULTRAM) 50 MG tablet; Take 1 tablet by mouth every 8 hours as needed for Pain for up to 7 days. Max Daily Amount: 150 mg

## 2025-06-19 NOTE — ASSESSMENT & PLAN NOTE
Controlled.  Continues levothyroxine.  Fasting labs today.  No changes pending results.  Follow-up in 6 months.    Orders:    levothyroxine (SYNTHROID) 75 MCG tablet; Take 1 tablet by mouth daily    TSH; Future    T4, Free; Future

## 2025-06-19 NOTE — ASSESSMENT & PLAN NOTE
Controlled.  Continues furosemide 3 days a week and losartan.  Follows with cardiology.  Fasting labs today.  No changes pending results.  Follow-up in 6 months.    Orders:    furosemide (LASIX) 40 MG tablet; TAKE 1 TABLET BY MOUTH DAILY AS  NEEDED FOR SWELLING , WEIGHT  GAIN    CBC with Auto Differential; Future    Lipid Panel; Future

## 2025-06-19 NOTE — ASSESSMENT & PLAN NOTE
Continues losartan.  Fasting labs today.  No changes pending results.  Follow-up in 6 months.    Orders:    losartan (COZAAR) 100 MG tablet; Take 1 tablet by mouth daily    Comprehensive Metabolic Panel; Future    CBC with Auto Differential; Future

## 2025-06-19 NOTE — PROGRESS NOTES
losartan (COZAAR) 100 MG tablet Take 1 tablet by mouth daily Yes Kimani Moyer APRN - CNP   rivaroxaban (XARELTO) 15 MG TABS tablet TAKE 1 TABLET BY MOUTH DAILY  WITH BREAKFAST Yes Kimani Moyer APRN - CNP   sotalol (BETAPACE) 80 MG tablet Take 1 tablet by mouth daily Yes Kimani Moyer APRN - CNP   spironolactone (ALDACTONE) 25 MG tablet Take 1 tablet by mouth daily Yes Kimani Moyer APRN - CNP   traMADol (ULTRAM) 50 MG tablet Take 1 tablet by mouth every 8 hours as needed for Pain for up to 7 days. Max Daily Amount: 150 mg Yes Kimani Moyer APRN - CNP   blood glucose monitor kit and supplies Dispense sufficient amount for indicated testing frequency plus additional to accommodate PRN testing needs. Dispense all needed supplies to include: monitor, strips, lancing device, lancets, control solutions, alcohol swabs. Yes Kimani Moyer APRN - CNP   omeprazole 20 MG EC tablet Take 1 tablet by mouth daily Yes ProviderChano MD   Multiple Vitamins-Minerals (THERAPEUTIC MULTIVITAMIN-MINERALS) tablet Take 1 tablet by mouth daily Yes ProviderChano MD   diphenhydrAMINE (BENADRYL) 25 MG tablet Take 1 tablet by mouth nightly as needed Yes ProviderChano MD       CareTeam (Including outside providers/suppliers regularly involved in providing care):   Patient Care Team:  Kimani Moyer APRN - CNP as PCP - General (Nurse Practitioner)  Kimani Moyer APRN - CNP as PCP - Empaneled Provider     Recommendations for Preventive Services Due: see orders and patient instructions/AVS.  Recommended screening schedule for the next 5-10 years is provided to the patient in written form: see Patient Instructions/AVS.     Reviewed and updated this visit:  Tobacco  Allergies  Meds  Problems  Med Hx  Surg Hx  Fam Hx                   The patient (or guardian, if applicable) and other individuals in attendance with the patient were advised that Artificial Intelligence will be utilized

## 2025-06-20 ENCOUNTER — RESULTS FOLLOW-UP (OUTPATIENT)
Dept: FAMILY MEDICINE CLINIC | Age: 89
End: 2025-06-20

## 2025-06-20 DIAGNOSIS — D64.9 ANEMIA, UNSPECIFIED TYPE: Primary | ICD-10-CM

## 2025-06-30 ENCOUNTER — PATIENT MESSAGE (OUTPATIENT)
Dept: FAMILY MEDICINE CLINIC | Age: 89
End: 2025-06-30

## 2025-06-30 DIAGNOSIS — D64.9 ANEMIA, UNSPECIFIED TYPE: Primary | ICD-10-CM

## 2025-06-30 DIAGNOSIS — N18.30 STAGE 3 CHRONIC KIDNEY DISEASE, UNSPECIFIED WHETHER STAGE 3A OR 3B CKD (HCC): ICD-10-CM

## 2025-06-30 NOTE — TELEPHONE ENCOUNTER
I have placed orders and tried to add the iron and ferritin studies to her blood work, and apparently the lab must not have gotten the fax as they were not done.  Please move up her labs only appointment to as soon as they are able, and we can recheck the CBC along with ferritin, iron, and CMP.

## 2025-06-30 NOTE — TELEPHONE ENCOUNTER
Given her numbers, I would prefer to move everything up to get the iron checked as opposed to waiting to do everything on the 17th.  So go ahead and just move the lab only appointment up to the soonest that they can do it.  No harm in rechecking the kidney function early.  Will need to be fasting because of the iron and ferritin.

## 2025-07-17 ENCOUNTER — LAB (OUTPATIENT)
Dept: FAMILY MEDICINE CLINIC | Age: 89
End: 2025-07-17
Payer: MEDICARE

## 2025-07-17 DIAGNOSIS — N18.30 STAGE 3 CHRONIC KIDNEY DISEASE, UNSPECIFIED WHETHER STAGE 3A OR 3B CKD (HCC): ICD-10-CM

## 2025-07-17 DIAGNOSIS — D64.9 ANEMIA, UNSPECIFIED TYPE: ICD-10-CM

## 2025-07-17 LAB
ALBUMIN SERPL-MCNC: 3.9 G/DL (ref 3.4–5)
ALBUMIN/GLOB SERPL: 1.7 {RATIO} (ref 1.1–2.2)
ALP SERPL-CCNC: 106 U/L (ref 40–129)
ALT SERPL-CCNC: 12 U/L (ref 10–40)
ANION GAP SERPL CALCULATED.3IONS-SCNC: 10 MMOL/L (ref 3–16)
AST SERPL-CCNC: 23 U/L (ref 15–37)
BASOPHILS # BLD: 0.1 K/UL (ref 0–0.2)
BASOPHILS NFR BLD: 0.8 %
BILIRUB SERPL-MCNC: 0.4 MG/DL (ref 0–1)
BUN SERPL-MCNC: 18 MG/DL (ref 7–20)
CALCIUM SERPL-MCNC: 10.3 MG/DL (ref 8.3–10.6)
CHLORIDE SERPL-SCNC: 99 MMOL/L (ref 99–110)
CO2 SERPL-SCNC: 28 MMOL/L (ref 21–32)
CREAT SERPL-MCNC: 1 MG/DL (ref 0.6–1.2)
DEPRECATED RDW RBC AUTO: 24.5 % (ref 12.4–15.4)
EOSINOPHIL # BLD: 0 K/UL (ref 0–0.6)
EOSINOPHIL NFR BLD: 0.3 %
FERRITIN SERPL IA-MCNC: 20.6 NG/ML (ref 15–150)
GFR SERPLBLD CREATININE-BSD FMLA CKD-EPI: 51 ML/MIN/{1.73_M2}
GLUCOSE SERPL-MCNC: 101 MG/DL (ref 70–99)
HCT VFR BLD AUTO: 32.1 % (ref 36–48)
HGB BLD-MCNC: 10.1 G/DL (ref 12–16)
IRON SATN MFR SERPL: 7 % (ref 15–50)
IRON SERPL-MCNC: 29 UG/DL (ref 37–145)
LYMPHOCYTES # BLD: 1.3 K/UL (ref 1–5.1)
LYMPHOCYTES NFR BLD: 18.2 %
MCH RBC QN AUTO: 22.9 PG (ref 26–34)
MCHC RBC AUTO-ENTMCNC: 31.5 G/DL (ref 31–36)
MCV RBC AUTO: 72.6 FL (ref 80–100)
MONOCYTES # BLD: 1 K/UL (ref 0–1.3)
MONOCYTES NFR BLD: 14 %
NEUTROPHILS # BLD: 4.8 K/UL (ref 1.7–7.7)
NEUTROPHILS NFR BLD: 66.7 %
PLATELET # BLD AUTO: 248 K/UL (ref 135–450)
PMV BLD AUTO: 7.4 FL (ref 5–10.5)
POTASSIUM SERPL-SCNC: 4.4 MMOL/L (ref 3.5–5.1)
PROT SERPL-MCNC: 6.2 G/DL (ref 6.4–8.2)
RBC # BLD AUTO: 4.42 M/UL (ref 4–5.2)
SODIUM SERPL-SCNC: 137 MMOL/L (ref 136–145)
TIBC SERPL-MCNC: 399 UG/DL (ref 260–445)
WBC # BLD AUTO: 7.2 K/UL (ref 4–11)

## 2025-07-17 PROCEDURE — 36415 COLL VENOUS BLD VENIPUNCTURE: CPT | Performed by: NURSE PRACTITIONER

## 2025-09-04 ENCOUNTER — PATIENT MESSAGE (OUTPATIENT)
Dept: FAMILY MEDICINE CLINIC | Age: 89
End: 2025-09-04